# Patient Record
Sex: FEMALE | Race: WHITE | Employment: OTHER | URBAN - METROPOLITAN AREA
[De-identification: names, ages, dates, MRNs, and addresses within clinical notes are randomized per-mention and may not be internally consistent; named-entity substitution may affect disease eponyms.]

---

## 2020-07-17 ENCOUNTER — HOSPITAL ENCOUNTER (INPATIENT)
Age: 70
LOS: 11 days | Discharge: NURSING, REHAB OR LONG-TERM CARE | DRG: 377 | End: 2020-07-28
Attending: EMERGENCY MEDICINE | Admitting: INTERNAL MEDICINE
Payer: COMMERCIAL

## 2020-07-17 ENCOUNTER — APPOINTMENT (OUTPATIENT)
Dept: CT IMAGING | Age: 70
DRG: 377 | End: 2020-07-17
Payer: COMMERCIAL

## 2020-07-17 PROBLEM — N17.9 ACUTE KIDNEY INJURY (HCC): Status: ACTIVE | Noted: 2020-07-17

## 2020-07-17 LAB
A/G RATIO: 0.8 (ref 1.1–2.2)
ABO/RH: NORMAL
ALBUMIN SERPL-MCNC: 2.5 G/DL (ref 3.4–5)
ALP BLD-CCNC: 147 U/L (ref 40–129)
ALT SERPL-CCNC: 11 U/L (ref 10–40)
ANION GAP SERPL CALCULATED.3IONS-SCNC: 14 MMOL/L (ref 3–16)
ANTIBODY SCREEN: NORMAL
APTT: 38.7 SEC (ref 24.2–36.2)
AST SERPL-CCNC: 19 U/L (ref 15–37)
BACTERIA: ABNORMAL /HPF
BASOPHILS ABSOLUTE: 0 K/UL (ref 0–0.2)
BASOPHILS RELATIVE PERCENT: 0.5 %
BILIRUB SERPL-MCNC: <0.2 MG/DL (ref 0–1)
BILIRUBIN URINE: NEGATIVE
BLOOD, URINE: ABNORMAL
BUN BLDV-MCNC: 83 MG/DL (ref 7–20)
CALCIUM SERPL-MCNC: 8.1 MG/DL (ref 8.3–10.6)
CHLORIDE BLD-SCNC: 112 MMOL/L (ref 99–110)
CLARITY: ABNORMAL
CO2: 11 MMOL/L (ref 21–32)
COLOR: YELLOW
CREAT SERPL-MCNC: 3.8 MG/DL (ref 0.6–1.2)
EOSINOPHILS ABSOLUTE: 0 K/UL (ref 0–0.6)
EOSINOPHILS RELATIVE PERCENT: 0.3 %
EPITHELIAL CELLS, UA: ABNORMAL /HPF (ref 0–5)
GFR AFRICAN AMERICAN: 14
GFR NON-AFRICAN AMERICAN: 12
GLOBULIN: 3 G/DL
GLUCOSE BLD-MCNC: 114 MG/DL (ref 70–99)
GLUCOSE URINE: NEGATIVE MG/DL
HCT VFR BLD CALC: 23.6 % (ref 36–48)
HEMOGLOBIN: 7.5 G/DL (ref 12–16)
INR BLD: 1.68 (ref 0.86–1.14)
KETONES, URINE: NEGATIVE MG/DL
LACTIC ACID: 1.2 MMOL/L (ref 0.4–2)
LEUKOCYTE ESTERASE, URINE: ABNORMAL
LYMPHOCYTES ABSOLUTE: 0.7 K/UL (ref 1–5.1)
LYMPHOCYTES RELATIVE PERCENT: 10 %
MCH RBC QN AUTO: 30.1 PG (ref 26–34)
MCHC RBC AUTO-ENTMCNC: 31.7 G/DL (ref 31–36)
MCV RBC AUTO: 94.9 FL (ref 80–100)
MICROSCOPIC EXAMINATION: YES
MONOCYTES ABSOLUTE: 0.5 K/UL (ref 0–1.3)
MONOCYTES RELATIVE PERCENT: 7.2 %
MUCUS: ABNORMAL /LPF
NEUTROPHILS ABSOLUTE: 6.1 K/UL (ref 1.7–7.7)
NEUTROPHILS RELATIVE PERCENT: 82 %
NITRITE, URINE: NEGATIVE
OCCULT BLOOD DIAGNOSTIC: ABNORMAL
OCCULT BLOOD DIAGNOSTIC: NORMAL
PDW BLD-RTO: 18 % (ref 12.4–15.4)
PH UA: 5.5 (ref 5–8)
PLATELET # BLD: 154 K/UL (ref 135–450)
PMV BLD AUTO: 7.7 FL (ref 5–10.5)
POTASSIUM REFLEX MAGNESIUM: 4.8 MMOL/L (ref 3.5–5.1)
PROTEIN UA: >=300 MG/DL
PROTHROMBIN TIME: 19.6 SEC (ref 10–13.2)
RBC # BLD: 2.49 M/UL (ref 4–5.2)
RBC UA: ABNORMAL /HPF (ref 0–4)
SODIUM BLD-SCNC: 137 MMOL/L (ref 136–145)
SPECIFIC GRAVITY UA: 1.02 (ref 1–1.03)
TOTAL PROTEIN: 5.5 G/DL (ref 6.4–8.2)
URINE REFLEX TO CULTURE: YES
URINE TYPE: ABNORMAL
UROBILINOGEN, URINE: 0.2 E.U./DL
WBC # BLD: 7.4 K/UL (ref 4–11)
WBC UA: ABNORMAL /HPF (ref 0–5)

## 2020-07-17 PROCEDURE — 83605 ASSAY OF LACTIC ACID: CPT

## 2020-07-17 PROCEDURE — 74176 CT ABD & PELVIS W/O CONTRAST: CPT

## 2020-07-17 PROCEDURE — 87077 CULTURE AEROBIC IDENTIFY: CPT

## 2020-07-17 PROCEDURE — 85610 PROTHROMBIN TIME: CPT

## 2020-07-17 PROCEDURE — 85025 COMPLETE CBC W/AUTO DIFF WBC: CPT

## 2020-07-17 PROCEDURE — 2580000003 HC RX 258: Performed by: INTERNAL MEDICINE

## 2020-07-17 PROCEDURE — 31500 INSERT EMERGENCY AIRWAY: CPT

## 2020-07-17 PROCEDURE — 6360000002 HC RX W HCPCS: Performed by: EMERGENCY MEDICINE

## 2020-07-17 PROCEDURE — 86850 RBC ANTIBODY SCREEN: CPT

## 2020-07-17 PROCEDURE — 86901 BLOOD TYPING SEROLOGIC RH(D): CPT

## 2020-07-17 PROCEDURE — 80053 COMPREHEN METABOLIC PANEL: CPT

## 2020-07-17 PROCEDURE — U0003 INFECTIOUS AGENT DETECTION BY NUCLEIC ACID (DNA OR RNA); SEVERE ACUTE RESPIRATORY SYNDROME CORONAVIRUS 2 (SARS-COV-2) (CORONAVIRUS DISEASE [COVID-19]), AMPLIFIED PROBE TECHNIQUE, MAKING USE OF HIGH THROUGHPUT TECHNOLOGIES AS DESCRIBED BY CMS-2020-01-R: HCPCS

## 2020-07-17 PROCEDURE — 99285 EMERGENCY DEPT VISIT HI MDM: CPT

## 2020-07-17 PROCEDURE — 2580000003 HC RX 258: Performed by: EMERGENCY MEDICINE

## 2020-07-17 PROCEDURE — 86923 COMPATIBILITY TEST ELECTRIC: CPT

## 2020-07-17 PROCEDURE — P9016 RBC LEUKOCYTES REDUCED: HCPCS

## 2020-07-17 PROCEDURE — 2500000003 HC RX 250 WO HCPCS: Performed by: INTERNAL MEDICINE

## 2020-07-17 PROCEDURE — 86900 BLOOD TYPING SEROLOGIC ABO: CPT

## 2020-07-17 PROCEDURE — 85730 THROMBOPLASTIN TIME PARTIAL: CPT

## 2020-07-17 PROCEDURE — 87086 URINE CULTURE/COLONY COUNT: CPT

## 2020-07-17 PROCEDURE — 87186 SC STD MICRODIL/AGAR DIL: CPT

## 2020-07-17 PROCEDURE — 81001 URINALYSIS AUTO W/SCOPE: CPT

## 2020-07-17 PROCEDURE — 2500000003 HC RX 250 WO HCPCS: Performed by: EMERGENCY MEDICINE

## 2020-07-17 PROCEDURE — G0328 FECAL BLOOD SCRN IMMUNOASSAY: HCPCS

## 2020-07-17 PROCEDURE — 1200000000 HC SEMI PRIVATE

## 2020-07-17 RX ORDER — ACETAMINOPHEN 650 MG/1
650 SUPPOSITORY RECTAL EVERY 6 HOURS PRN
Status: DISCONTINUED | OUTPATIENT
Start: 2020-07-17 | End: 2020-07-28 | Stop reason: HOSPADM

## 2020-07-17 RX ORDER — ACETAMINOPHEN 500 MG
500 TABLET ORAL EVERY 4 HOURS PRN
COMMUNITY

## 2020-07-17 RX ORDER — DOCUSATE SODIUM 100 MG/1
100 CAPSULE, LIQUID FILLED ORAL DAILY PRN
COMMUNITY

## 2020-07-17 RX ORDER — LANOLIN ALCOHOL/MO/W.PET/CERES
325 CREAM (GRAM) TOPICAL 2 TIMES DAILY
COMMUNITY
End: 2020-10-08

## 2020-07-17 RX ORDER — ARIPIPRAZOLE 5 MG/1
5 TABLET ORAL DAILY
COMMUNITY

## 2020-07-17 RX ORDER — PANTOPRAZOLE SODIUM 40 MG/10ML
40 INJECTION, POWDER, LYOPHILIZED, FOR SOLUTION INTRAVENOUS 2 TIMES DAILY
Status: DISCONTINUED | OUTPATIENT
Start: 2020-07-18 | End: 2020-07-28 | Stop reason: HOSPADM

## 2020-07-17 RX ORDER — ACETAMINOPHEN 325 MG/1
650 TABLET ORAL EVERY 6 HOURS PRN
Status: DISCONTINUED | OUTPATIENT
Start: 2020-07-17 | End: 2020-07-28 | Stop reason: HOSPADM

## 2020-07-17 RX ORDER — ONDANSETRON 4 MG/1
4 TABLET, FILM COATED ORAL EVERY 6 HOURS PRN
COMMUNITY

## 2020-07-17 RX ORDER — LEVETIRACETAM 500 MG/1
500 TABLET ORAL 2 TIMES DAILY
Status: DISCONTINUED | OUTPATIENT
Start: 2020-07-18 | End: 2020-07-28 | Stop reason: HOSPADM

## 2020-07-17 RX ORDER — PROMETHAZINE HYDROCHLORIDE 25 MG/1
12.5 TABLET ORAL EVERY 6 HOURS PRN
Status: DISCONTINUED | OUTPATIENT
Start: 2020-07-17 | End: 2020-07-28 | Stop reason: HOSPADM

## 2020-07-17 RX ORDER — SENNA PLUS 8.6 MG/1
1 TABLET ORAL DAILY PRN
COMMUNITY

## 2020-07-17 RX ORDER — ISOSORBIDE MONONITRATE 30 MG/1
30 TABLET, EXTENDED RELEASE ORAL DAILY
Status: DISCONTINUED | OUTPATIENT
Start: 2020-07-18 | End: 2020-07-28 | Stop reason: HOSPADM

## 2020-07-17 RX ORDER — PRAVASTATIN SODIUM 40 MG
40 TABLET ORAL DAILY
COMMUNITY

## 2020-07-17 RX ORDER — LEVOTHYROXINE SODIUM 0.05 MG/1
75 TABLET ORAL DAILY
COMMUNITY

## 2020-07-17 RX ORDER — ASCORBIC ACID 500 MG
500 TABLET ORAL DAILY
COMMUNITY

## 2020-07-17 RX ORDER — DOXYCYCLINE HYCLATE 100 MG/1
100 CAPSULE ORAL 2 TIMES DAILY
Status: ON HOLD | COMMUNITY
End: 2020-07-28 | Stop reason: HOSPADM

## 2020-07-17 RX ORDER — ASPIRIN 81 MG/1
81 TABLET ORAL DAILY
COMMUNITY

## 2020-07-17 RX ORDER — ONDANSETRON 2 MG/ML
4 INJECTION INTRAMUSCULAR; INTRAVENOUS EVERY 6 HOURS PRN
Status: DISCONTINUED | OUTPATIENT
Start: 2020-07-17 | End: 2020-07-28 | Stop reason: HOSPADM

## 2020-07-17 RX ORDER — RALOXIFENE HYDROCHLORIDE 60 MG/1
60 TABLET, FILM COATED ORAL DAILY
COMMUNITY

## 2020-07-17 RX ORDER — SERTRALINE HYDROCHLORIDE 25 MG/1
50 TABLET, FILM COATED ORAL DAILY
COMMUNITY

## 2020-07-17 RX ORDER — RALOXIFENE HYDROCHLORIDE 60 MG/1
60 TABLET, FILM COATED ORAL DAILY
Status: DISCONTINUED | OUTPATIENT
Start: 2020-07-18 | End: 2020-07-28 | Stop reason: HOSPADM

## 2020-07-17 RX ORDER — SODIUM CHLORIDE 0.9 % (FLUSH) 0.9 %
10 SYRINGE (ML) INJECTION EVERY 12 HOURS SCHEDULED
Status: DISCONTINUED | OUTPATIENT
Start: 2020-07-18 | End: 2020-07-28 | Stop reason: HOSPADM

## 2020-07-17 RX ORDER — ISOSORBIDE MONONITRATE 30 MG/1
30 TABLET, EXTENDED RELEASE ORAL DAILY
Status: ON HOLD | COMMUNITY
End: 2020-10-10 | Stop reason: HOSPADM

## 2020-07-17 RX ORDER — PRAVASTATIN SODIUM 40 MG
40 TABLET ORAL NIGHTLY
Status: DISCONTINUED | OUTPATIENT
Start: 2020-07-18 | End: 2020-07-28 | Stop reason: HOSPADM

## 2020-07-17 RX ORDER — SODIUM CHLORIDE 0.9 % (FLUSH) 0.9 %
10 SYRINGE (ML) INJECTION PRN
Status: DISCONTINUED | OUTPATIENT
Start: 2020-07-17 | End: 2020-07-28 | Stop reason: HOSPADM

## 2020-07-17 RX ADMIN — METRONIDAZOLE 500 MG: 500 INJECTION, SOLUTION INTRAVENOUS at 23:06

## 2020-07-17 RX ADMIN — SODIUM BICARBONATE: 84 INJECTION, SOLUTION INTRAVENOUS at 21:35

## 2020-07-17 RX ADMIN — CEFEPIME 2 G: 2 INJECTION, POWDER, FOR SOLUTION INTRAVENOUS at 21:32

## 2020-07-17 ASSESSMENT — PAIN DESCRIPTION - LOCATION: LOCATION: ABDOMEN

## 2020-07-17 ASSESSMENT — PAIN SCALES - GENERAL: PAINLEVEL_OUTOF10: 8

## 2020-07-17 ASSESSMENT — PAIN DESCRIPTION - FREQUENCY: FREQUENCY: INTERMITTENT

## 2020-07-17 ASSESSMENT — PAIN DESCRIPTION - ORIENTATION: ORIENTATION: RIGHT

## 2020-07-17 ASSESSMENT — PAIN DESCRIPTION - DESCRIPTORS: DESCRIPTORS: CRAMPING

## 2020-07-17 NOTE — ED NOTES
Lab called to report Co2 of 11 and BUN 83. Dr. Pam Zee aware. Will inform pt nurse.       Suzanne Kendall RN  07/17/20 8691

## 2020-07-17 NOTE — ED PROVIDER NOTES
[address] ED    CHIEF COMPLAINT  Abnormal Lab (Pt was brought in by EMS from [facility] for H&H of 6 & 20. )       HISTORY OF PRESENT ILLNESS  Netta Iglesias is a 72 y.o. female with past medical history including dementia, DVT (on Eliquis), hyperlipidemia, status post pacemaker who presents to the ED with concern for anemia noted on laboratory studies in the outpatient setting. The patient presents by EMS from [facility] where she apparently had labs revealing hemoglobin and hematocrit of 6 and 20 respectively. The patient denies chest pain. She states she was short of breath earlier in the day but not presently with shortness of breath. Denies nausea, vomiting. She complains of loose stool. She is not sure if the stool is bloody. Asked about abdominal pain the patient motions around the umbilicus but does not elaborate further. The patient is somewhat of a poor historian. Call to nursing facility reveals that patient 1 week ago had several days of nausea and vomiting however this apparently had resolved over the past 3 days. No other complaints, modifying factors or associated symptoms. I have reviewed the following from the nursing documentation:    Past Medical History:   Diagnosis Date    Anemia     Cognitive communication deficit     [facility] [address]     Depressive disorder     Muscle wasting     Osteomyelitis of lumbar spine ([facility] [address]     Sepsis ([facility] [address]      No past surgical history on file. No family history on file.   Social History     Socioeconomic History    Marital status: Single     Spouse name: Not on file    Number of children: Not on file    Years of education: Not on file    Highest education level: Not on file   Occupational History    Not on file   Social Needs    Financial resource strain: Not on file    Food insecurity     Worry: Not on file     Inability: Not on file    Transportation needs     Medical: Not on file     Non-medical: Not on file Tobacco Use    Smoking status: Not on file   Substance and Sexual Activity    Alcohol use: Not on file    Drug use: Not on file    Sexual activity: Not on file   Lifestyle    Physical activity     Days per week: Not on file     Minutes per session: Not on file    Stress: Not on file   Relationships    Social connections     Talks on phone: Not on file     Gets together: Not on file     Attends Voodoo service: Not on file     Active member of club or organization: Not on file     Attends meetings of clubs or organizations: Not on file     Relationship status: Not on file    Intimate partner violence     Fear of current or ex partner: Not on file     Emotionally abused: Not on file     Physically abused: Not on file     Forced sexual activity: Not on file   Other Topics Concern    Not on file   Social History Narrative    Not on file     Current Facility-Administered Medications   Medication Dose Route Frequency Provider Last Rate Last Dose    cefepime (MAXIPIME) 2 g IVPB minibag  2 g Intravenous Once Tad MD Omero        metronidazole (FLAGYL) 500 mg in NaCl 100 mL IVPB premix  500 mg Intravenous Once Tad MD Omero        sodium bicarbonate 75 mEq in sodium chloride 0.45 % 1,000 mL infusion   Intravenous Continuous Arlene Weiss MD         No current outpatient medications on file. Allergies   Allergen Reactions    Penicillins Rash       REVIEW OF SYSTEMS  10 systems reviewed, pertinent positives and negatives per HPI, otherwise noted to be negative. PHYSICAL EXAM  ED Triage Vitals [07/17/20 1824]   Enc Vitals Group      BP (!) 159/84      Pulse 92      Resp 18      Temp 98.8 °F (37.1 °C)      Temp Source Oral      SpO2 99 %      Weight       Height       Head Circumference       Peak Flow       Pain Score       Pain Loc       Pain Edu? Excl. in [address]? General appearance: Awake and alert. Cooperative. No acute distress. HENT: Normocephalic. Atraumatic.  Mucous membranes are tacky. Neck: Supple. Eyes: PERRL. EOMI. Heart/Chest: RRR. No murmurs. Lungs: Respirations unlabored. CTAB. Good air exchange. Speaking comfortably in full sentences. Abdomen: Soft. Non-tender. Non-distended. No rebound or guarding. Rectal: external hemorrhoids, no internal masses, no gross blood, trace stool  Musculoskeletal: No extremity edema. No deformity. No tenderness in the extremities. All extremities neurovascularly intact. Skin: Warm and dry. No acute rashes. Neurological: Alert and oriented. CN II-XII intact. Moves all four extremities. Sensation intact to light touch. Psychiatric: Mood/affect: normal      LABS  I have reviewed all labs for this visit.    Results for orders placed or performed during the hospital encounter of 07/17/20   CBC Auto Differential   Result Value Ref Range    WBC 7.4 4.0 - 11.0 K/uL    RBC 2.49 (L) 4.00 - 5.20 M/uL    Hemoglobin 7.5 (L) 12.0 - 16.0 g/dL    Hematocrit 23.6 (L) 36.0 - 48.0 %    MCV 94.9 80.0 - 100.0 fL    MCH 30.1 26.0 - 34.0 pg    MCHC 31.7 31.0 - 36.0 g/dL    RDW 18.0 (H) 12.4 - 15.4 %    Platelets 511 952 - 431 K/uL    MPV 7.7 5.0 - 10.5 fL    Neutrophils % 82.0 %    Lymphocytes % 10.0 %    Monocytes % 7.2 %    Eosinophils % 0.3 %    Basophils % 0.5 %    Neutrophils Absolute 6.1 1.7 - 7.7 K/uL    Lymphocytes Absolute 0.7 (L) 1.0 - 5.1 K/uL    Monocytes Absolute 0.5 0.0 - 1.3 K/uL    Eosinophils Absolute 0.0 0.0 - 0.6 K/uL    Basophils Absolute 0.0 0.0 - 0.2 K/uL   Comprehensive Metabolic Panel w/ Reflex to MG   Result Value Ref Range    Sodium 137 136 - 145 mmol/L    Potassium reflex Magnesium 4.8 3.5 - 5.1 mmol/L    Chloride 112 (H) 99 - 110 mmol/L    CO2 11 (LL) 21 - 32 mmol/L    Anion Gap 14 3 - 16    Glucose 114 (H) 70 - 99 mg/dL    BUN 83 (HH) 7 - 20 mg/dL    CREATININE 3.8 (H) 0.6 - 1.2 mg/dL    GFR Non- 12 (A) >60    GFR  14 (A) >60    Calcium 8.1 (L) 8.3 - 10.6 mg/dL    Total Protein 5.5 (L) 6.4 - 8.2 g/dL    Alb 2.5 (L) 3.4 - 5.0 g/dL    Albumin/Globulin Ratio 0.8 (L) 1.1 - 2.2    Total Bilirubin <0.2 0.0 - 1.0 mg/dL    Alkaline Phosphatase 147 (H) 40 - 129 U/L    ALT 11 10 - 40 U/L    AST 19 15 - 37 U/L    Globulin 3.0 g/dL   Protime-INR   Result Value Ref Range    Protime 19.6 (H) 10.0 - 13.2 sec    INR 1.68 (H) 0.86 - 1.14   APTT   Result Value Ref Range    aPTT 38.7 (H) 24.2 - 36.2 sec   Lactic Acid, Plasma   Result Value Ref Range    Lactic Acid 1.2 0.4 - 2.0 mmol/L   Urinalysis Reflex to Culture    Specimen: Urine, clean catch   Result Value Ref Range    Color, UA Yellow Straw/Yellow    Clarity, UA SL CLOUDY (A) Clear    Glucose, Ur Negative Negative mg/dL    Bilirubin Urine Negative Negative    Ketones, Urine Negative Negative mg/dL    Specific Gravity, UA 1.025 1.005 - 1.030    Blood, Urine LARGE (A) Negative    pH, UA 5.5 5.0 - 8.0    Protein, UA >=300 (A) Negative mg/dL    Urobilinogen, Urine 0.2 <2.0 E.U./dL    Nitrite, Urine Negative Negative    Leukocyte Esterase, Urine MODERATE (A) Negative    Microscopic Examination YES     Urine Type NotGiven     Urine Reflex to Culture Yes    Blood occult stool #1   Result Value Ref Range    Occult Blood Diagnostic Result: Negative  Normal range: Negative      Microscopic Urinalysis   Result Value Ref Range    Mucus, UA 2+ (A) None Seen /LPF    WBC, UA 21-50 (A) 0 - 5 /HPF    RBC, UA  (A) 0 - 4 /HPF    Epithelial Cells, UA 11-20 (A) 0 - 5 /HPF    Bacteria, UA 3+ (A) None Seen /HPF   TYPE AND SCREEN   Result Value Ref Range    ABO/Rh B POS     Antibody Screen NEG        RADIOLOGY  I have reviewed all radiographic studies for this visit. CT ABDOMEN PELVIS WO CONTRAST Additional Contrast? None   Final Result   Marked mural thickening of the rectum with perirectal fat stranding,   suggesting proctitis. That should be followed to resolution to ensure that   there is no underlying neoplasm.       Small bilateral pleural effusions with minimal adjacent atelectasis. ED COURSE/MDM  Patient seen and evaluated. Old records reviewed. Labs and imaging reviewed and results discussed with patient/family to extent possible. 60-year-old female presents for evaluation of anemia after outpatient labs revealed hemoglobin 6. On arrival the patient is slightly hypertensive with otherwise reassuring vital signs. Abdominal exam is benign. Rectal exam no gross blood, no masses. Fecal occult blood test is positive. The patient complains of lower abdominal pain and urine appears possibly consistent with urinary tract infection. CT of the abdomen and pelvis shows thickening of the rectum with pararectal fat stranding suggesting the possibility of proctitis. The rectal exam was essentially unremarkable but will administer cefepime and Flagyl to cover the urinary tract infection and the possible proctitis. I do note the possibility of underlying neoplasm given this finding and this will need to be followed during the patient's admission. Labs are otherwise notable for creatinine of 3.8 with no known history of chronic kidney disease. Favor prerenal as the patient appears hypovolemic. CBC shows anemia with a hemoglobin of 7.5, no medication for transfusion at this time but will require trending. Admit to hospital medicine for further evaluation and treatment. During the patient's ED course, the patient was given:  Medications   cefepime (MAXIPIME) 2 g IVPB minibag (has no administration in time range)   metronidazole (FLAGYL) 500 mg in NaCl 100 mL IVPB premix (has no administration in time range)   sodium bicarbonate 75 mEq in sodium chloride 0.45 % 1,000 mL infusion (has no administration in time range)        All questions were answered and the patient/family expressed understanding and agreement with the plan. PROCEDURES  None    CRITICAL CARE  N/A    CLINICAL IMPRESSION  1. JEN (acute kidney injury) ([facility] [address]    2.  Anemia, unspecified type DISPOSITION   admit    Emi Russell MD    Note: This chart was created using voice recognition dictation software. Efforts were made by me to ensure accuracy, however some errors may be present due to limitations of this technology and occasionally words are not transcribed correctly.         Emi Russell MD  07/17/20 8359

## 2020-07-18 PROBLEM — R56.9 SEIZURES (HCC): Status: ACTIVE | Noted: 2020-07-18

## 2020-07-18 PROBLEM — E03.9 ACQUIRED HYPOTHYROIDISM: Status: ACTIVE | Noted: 2020-07-18

## 2020-07-18 PROBLEM — K92.2 GI BLEED: Status: ACTIVE | Noted: 2020-07-18

## 2020-07-18 PROBLEM — D62 ACUTE BLOOD LOSS ANEMIA: Status: ACTIVE | Noted: 2020-07-18

## 2020-07-18 PROBLEM — K62.89 PROCTITIS: Status: ACTIVE | Noted: 2020-07-18

## 2020-07-18 LAB
ALBUMIN SERPL-MCNC: 2 G/DL (ref 3.4–5)
ALBUMIN SERPL-MCNC: 2.1 G/DL (ref 3.4–5)
ANION GAP SERPL CALCULATED.3IONS-SCNC: 14 MMOL/L (ref 3–16)
ANION GAP SERPL CALCULATED.3IONS-SCNC: 15 MMOL/L (ref 3–16)
BASOPHILS ABSOLUTE: 0 K/UL (ref 0–0.2)
BASOPHILS RELATIVE PERCENT: 0.7 %
BLOOD BANK DISPENSE STATUS: NORMAL
BLOOD BANK PRODUCT CODE: NORMAL
BPU ID: NORMAL
BUN BLDV-MCNC: 83 MG/DL (ref 7–20)
BUN BLDV-MCNC: 86 MG/DL (ref 7–20)
CALCIUM SERPL-MCNC: 7.6 MG/DL (ref 8.3–10.6)
CALCIUM SERPL-MCNC: 7.8 MG/DL (ref 8.3–10.6)
CHLORIDE BLD-SCNC: 113 MMOL/L (ref 99–110)
CHLORIDE BLD-SCNC: 114 MMOL/L (ref 99–110)
CO2: 10 MMOL/L (ref 21–32)
CO2: 13 MMOL/L (ref 21–32)
CREAT SERPL-MCNC: 3.7 MG/DL (ref 0.6–1.2)
CREAT SERPL-MCNC: 3.7 MG/DL (ref 0.6–1.2)
DESCRIPTION BLOOD BANK: NORMAL
EOSINOPHILS ABSOLUTE: 0 K/UL (ref 0–0.6)
EOSINOPHILS RELATIVE PERCENT: 0.4 %
GFR AFRICAN AMERICAN: 15
GFR AFRICAN AMERICAN: 15
GFR NON-AFRICAN AMERICAN: 12
GFR NON-AFRICAN AMERICAN: 12
GLUCOSE BLD-MCNC: 111 MG/DL (ref 70–99)
GLUCOSE BLD-MCNC: 86 MG/DL (ref 70–99)
HCT VFR BLD CALC: 20.3 % (ref 36–48)
HCT VFR BLD CALC: 24.1 % (ref 36–48)
HCT VFR BLD CALC: 25.2 % (ref 36–48)
HCT VFR BLD CALC: 26.8 % (ref 36–48)
HEMOGLOBIN: 6.5 G/DL (ref 12–16)
HEMOGLOBIN: 7.3 G/DL (ref 12–16)
HEMOGLOBIN: 8.4 G/DL (ref 12–16)
HEMOGLOBIN: 8.8 G/DL (ref 12–16)
LYMPHOCYTES ABSOLUTE: 0.8 K/UL (ref 1–5.1)
LYMPHOCYTES RELATIVE PERCENT: 15.8 %
MCH RBC QN AUTO: 30.4 PG (ref 26–34)
MCHC RBC AUTO-ENTMCNC: 32.2 G/DL (ref 31–36)
MCV RBC AUTO: 94.2 FL (ref 80–100)
MONOCYTES ABSOLUTE: 0.4 K/UL (ref 0–1.3)
MONOCYTES RELATIVE PERCENT: 8.3 %
NEUTROPHILS ABSOLUTE: 3.8 K/UL (ref 1.7–7.7)
NEUTROPHILS RELATIVE PERCENT: 74.8 %
PDW BLD-RTO: 17 % (ref 12.4–15.4)
PHOSPHORUS: 5.3 MG/DL (ref 2.5–4.9)
PHOSPHORUS: 5.5 MG/DL (ref 2.5–4.9)
PLATELET # BLD: 138 K/UL (ref 135–450)
PMV BLD AUTO: 7.8 FL (ref 5–10.5)
POTASSIUM SERPL-SCNC: 4.3 MMOL/L (ref 3.5–5.1)
POTASSIUM SERPL-SCNC: 4.7 MMOL/L (ref 3.5–5.1)
RBC # BLD: 2.15 M/UL (ref 4–5.2)
SODIUM BLD-SCNC: 139 MMOL/L (ref 136–145)
SODIUM BLD-SCNC: 140 MMOL/L (ref 136–145)
WBC # BLD: 5.1 K/UL (ref 4–11)

## 2020-07-18 PROCEDURE — 2580000003 HC RX 258: Performed by: INTERNAL MEDICINE

## 2020-07-18 PROCEDURE — 85025 COMPLETE CBC W/AUTO DIFF WBC: CPT

## 2020-07-18 PROCEDURE — C9113 INJ PANTOPRAZOLE SODIUM, VIA: HCPCS | Performed by: INTERNAL MEDICINE

## 2020-07-18 PROCEDURE — 6370000000 HC RX 637 (ALT 250 FOR IP): Performed by: INTERNAL MEDICINE

## 2020-07-18 PROCEDURE — 2500000003 HC RX 250 WO HCPCS: Performed by: INTERNAL MEDICINE

## 2020-07-18 PROCEDURE — 1200000000 HC SEMI PRIVATE

## 2020-07-18 PROCEDURE — 85014 HEMATOCRIT: CPT

## 2020-07-18 PROCEDURE — 6360000002 HC RX W HCPCS: Performed by: INTERNAL MEDICINE

## 2020-07-18 PROCEDURE — 85018 HEMOGLOBIN: CPT

## 2020-07-18 PROCEDURE — 80069 RENAL FUNCTION PANEL: CPT

## 2020-07-18 PROCEDURE — 99233 SBSQ HOSP IP/OBS HIGH 50: CPT | Performed by: INTERNAL MEDICINE

## 2020-07-18 PROCEDURE — 36430 TRANSFUSION BLD/BLD COMPNT: CPT

## 2020-07-18 PROCEDURE — 30233N1 TRANSFUSION OF NONAUTOLOGOUS RED BLOOD CELLS INTO PERIPHERAL VEIN, PERCUTANEOUS APPROACH: ICD-10-PCS | Performed by: INTERNAL MEDICINE

## 2020-07-18 PROCEDURE — 36415 COLL VENOUS BLD VENIPUNCTURE: CPT

## 2020-07-18 RX ORDER — 0.9 % SODIUM CHLORIDE 0.9 %
20 INTRAVENOUS SOLUTION INTRAVENOUS ONCE
Status: COMPLETED | OUTPATIENT
Start: 2020-07-18 | End: 2020-07-18

## 2020-07-18 RX ORDER — LACTULOSE 10 G/15ML
20 SOLUTION ORAL DAILY PRN
Status: ON HOLD | COMMUNITY
End: 2020-10-09 | Stop reason: ALTCHOICE

## 2020-07-18 RX ORDER — HYDROCODONE BITARTRATE AND ACETAMINOPHEN 5; 325 MG/1; MG/1
1 TABLET ORAL EVERY 6 HOURS PRN
Status: ON HOLD | COMMUNITY
End: 2020-07-28 | Stop reason: HOSPADM

## 2020-07-18 RX ADMIN — SERTRALINE 50 MG: 50 TABLET, FILM COATED ORAL at 08:15

## 2020-07-18 RX ADMIN — METRONIDAZOLE 500 MG: 500 INJECTION, SOLUTION INTRAVENOUS at 08:19

## 2020-07-18 RX ADMIN — Medication 10 ML: at 20:41

## 2020-07-18 RX ADMIN — Medication: at 11:03

## 2020-07-18 RX ADMIN — LEVETIRACETAM 500 MG: 500 TABLET ORAL at 00:40

## 2020-07-18 RX ADMIN — ONDANSETRON HYDROCHLORIDE 4 MG: 2 INJECTION, SOLUTION INTRAMUSCULAR; INTRAVENOUS at 00:50

## 2020-07-18 RX ADMIN — PRAVASTATIN SODIUM 40 MG: 40 TABLET ORAL at 00:40

## 2020-07-18 RX ADMIN — RALOXIFENE HYDROCHLORIDE 60 MG: 60 TABLET, FILM COATED ORAL at 08:15

## 2020-07-18 RX ADMIN — Medication: at 23:03

## 2020-07-18 RX ADMIN — CEFTRIAXONE SODIUM 1 G: 1 INJECTION, POWDER, FOR SOLUTION INTRAMUSCULAR; INTRAVENOUS at 17:02

## 2020-07-18 RX ADMIN — LEVETIRACETAM 500 MG: 500 TABLET ORAL at 08:14

## 2020-07-18 RX ADMIN — SODIUM CHLORIDE 20 ML: 9 INJECTION, SOLUTION INTRAVENOUS at 11:02

## 2020-07-18 RX ADMIN — Medication 10 ML: at 00:41

## 2020-07-18 RX ADMIN — METRONIDAZOLE 500 MG: 500 INJECTION, SOLUTION INTRAVENOUS at 15:04

## 2020-07-18 RX ADMIN — PANTOPRAZOLE SODIUM 40 MG: 40 INJECTION, POWDER, FOR SOLUTION INTRAVENOUS at 00:41

## 2020-07-18 RX ADMIN — ISOSORBIDE MONONITRATE 30 MG: 30 TABLET ORAL at 08:14

## 2020-07-18 RX ADMIN — PANTOPRAZOLE SODIUM 40 MG: 40 INJECTION, POWDER, FOR SOLUTION INTRAVENOUS at 20:41

## 2020-07-18 RX ADMIN — PANTOPRAZOLE SODIUM 40 MG: 40 INJECTION, POWDER, FOR SOLUTION INTRAVENOUS at 08:14

## 2020-07-18 RX ADMIN — PRAVASTATIN SODIUM 40 MG: 40 TABLET ORAL at 20:41

## 2020-07-18 RX ADMIN — LEVETIRACETAM 500 MG: 500 TABLET ORAL at 20:40

## 2020-07-18 RX ADMIN — Medication 10 ML: at 08:21

## 2020-07-18 ASSESSMENT — PAIN SCALES - GENERAL
PAINLEVEL_OUTOF10: 0

## 2020-07-18 NOTE — ED NOTES
Perfect Serve message sent to Dr. Doc Ovalles for admission     Adams Zayas RN  07/17/20 2040 2110~Dr. Doc Ovalles returned call to Dr. Rodrick Ugalde RN  07/17/20 2114

## 2020-07-18 NOTE — PROGRESS NOTES
Progress Note    Admit Date:  7/17/2020    Subjective:  Ms. El Cassette not a very good historian. She does have some lower abdominal pain and has had some rectal bleeding. No fever or chills. Baseline creatinine is not known but admission creatinine was 3.8. Since she came from the nursing home she is a COVID-19 rule out. No chest pain or shortness of breath. Objective:   BP (!) 147/84   Pulse 83   Temp 97.6 °F (36.4 °C) (Oral)   Resp 16   SpO2 99%          Intake/Output Summary (Last 24 hours) at 7/18/2020 1104  Last data filed at 7/18/2020 0549  Gross per 24 hour   Intake 750 ml   Output --   Net 750 ml       Physical Exam:    General appearance: alert, appears stated age and cooperative  Head: Normocephalic, without obvious abnormality, atraumatic  Eyes: conjunctivae/corneas clear. PERRL, EOM's intact.   Neck: no adenopathy, no carotid bruit, no JVD, supple, symmetrical, trachea midline and thyroid not enlarged, symmetric, no tenderness/mass/nodules  Lungs: clear to auscultation bilaterally  Heart: regular rate and rhythm, S1, S2 normal, no murmur, click, rub or gallop  Abdomen: soft, non-tender; bowel sounds normal; no masses,  no organomegaly  Extremities: extremities normal, atraumatic, no cyanosis or edema  Pulses: 2+ and symmetric  Skin: Skin color, texture, turgor normal. No rashes or lesions  Neurologic: Grossly normal    Scheduled Meds:   sodium chloride  20 mL Intravenous Once    sodium chloride flush  10 mL Intravenous 2 times per day    cefTRIAXone (ROCEPHIN) IV  1 g Intravenous Q24H    metroNIDAZOLE  500 mg Intravenous Q8H    levETIRAcetam  500 mg Oral BID    raloxifene  60 mg Oral Daily    isosorbide mononitrate  30 mg Oral Daily    sertraline  50 mg Oral Daily    pravastatin  40 mg Oral Nightly    pantoprazole  40 mg Intravenous BID       Continuous Infusions:   sodium bicarbonate infusion 125 mL/hr at 07/18/20 1103       PRN Meds:  sodium chloride flush, acetaminophen **OR** DVT prophylaxis.     Jenny Alcala MD 7/18/2020 11:04 AM

## 2020-07-18 NOTE — PLAN OF CARE
Problem: Falls - Risk of:  Goal: Will remain free from falls  Description: Will remain free from falls  Outcome: Ongoing  Goal: Absence of physical injury  Description: Absence of physical injury  Outcome: Ongoing     Problem: Infection:  Goal: Will remain free from infection  Description: Will remain free from infection  Outcome: Ongoing     Problem: Safety:  Goal: Free from accidental physical injury  Description: Free from accidental physical injury  Outcome: Ongoing    Problem: Daily Care:  Goal: Daily care needs are met  Description: Daily care needs are met  Outcome: Ongoing     Problem: Pain:  Goal: Patient's pain/discomfort is manageable  Description: Patient's pain/discomfort is manageable  Outcome: Ongoing     Problem: Skin Integrity:  Goal: Skin integrity will stabilize  Description: Skin integrity will stabilize  Outcome: Ongoing     Problem: Discharge Planning:  Goal: Patients continuum of care needs are met  Description: Patients continuum of care needs are met  Outcome: Ongoing

## 2020-07-18 NOTE — CONSULTS
Gastroenterology Consult Note    Patient:   Chapo Owusu   :    1950   Facility:   [facility]   Referring/PCP: Elo Juan MD  Date:     2020  Consultant:   Kena Aguilera MD      Chief Complaint   Patient presents with    Abnormal Lab     Pt was brought in by EMS from [facility] for H&H of 6 & 20. History of Present illness     72year old female with history of hypothyroidism, DVT, cardiac pacemaker, HLD, seizure do, osteomyelitis, depression, and dementia transferred from NH with abnormal labs. She is a poor historian due to underlying dementia. She mentioned intermittent rectal bleeding. She denies any abdominal pain, nausea, vomiting, dysphagia, hematemesis, or melena. She does not recall ever having colonoscopy. She is on Eliquis for DVT. She was noted to have severe anemia with Hgb of 6.5. CT showed proctitis. Anticoagulation has been held. Past Medical History:   Diagnosis Date    Anemia     Cognitive communication deficit     Convulsions ([facility] [address]     Depressive disorder     Muscle wasting     Osteomyelitis of lumbar spine ([facility] [address]     Sepsis ([facility] [address]      History reviewed. No pertinent surgical history. Social:   Social History     Tobacco Use    Smoking status: Never Smoker    Smokeless tobacco: Never Used   Substance Use Topics    Alcohol use: Not on file     Family: History reviewed. No pertinent family history. No current facility-administered medications on file prior to encounter. Current Outpatient Medications on File Prior to Encounter   Medication Sig Dispense Refill    bismuth subsalicylate (PEPTO BISMOL) 525 MG/15ML suspension Take 30 mLs by mouth every 8 hours as needed for Indigestion      HYDROcodone-acetaminophen (NORCO) 5-325 MG per tablet Take 1 tablet by mouth every 6 hours as needed for Pain.       lactulose (CHRONULAC) 10 GM/15ML solution Take 20 g by mouth daily as needed      Nutritional Supplements (RESOURCE 2.0 PO) Take 120 mLs by mouth 3 times daily      levothyroxine (SYNTHROID) 75 MCG tablet Take 75 mcg by mouth Daily      ARIPiprazole (ABILIFY) 10 MG tablet Take 10 mg by mouth daily      aspirin 81 MG EC tablet Take 81 mg by mouth daily      docusate sodium (COLACE) 100 MG capsule Take 100 mg by mouth 2 times daily      doxycycline hyclate (VIBRAMYCIN) 100 MG capsule Take 100 mg by mouth 2 times daily      apixaban (ELIQUIS) 5 MG TABS tablet Take 5 mg by mouth 2 times daily      ferrous sulfate (FE TABS 325) 325 (65 Fe) MG EC tablet Take 325 mg by mouth 2 times daily      isosorbide mononitrate (IMDUR) 30 MG extended release tablet Take 30 mg by mouth daily      levETIRAcetam (KEPPRA) 500 MG tablet Take 500 mg by mouth 2 times daily      raloxifene (EVISTA) 60 MG tablet Take 60 mg by mouth daily      senna (SENOKOT) 8.6 MG tablet Take 1 tablet by mouth 2 times daily      vitamin C (ASCORBIC ACID) 500 MG tablet Take 500 mg by mouth daily      sertraline (ZOLOFT) 50 MG tablet Take 50 mg by mouth daily      pravastatin (PRAVACHOL) 40 MG tablet Take 40 mg by mouth daily      acetaminophen (TYLENOL) 500 MG tablet Take 500 mg by mouth every 4 hours as needed for Pain      ondansetron (ZOFRAN) 4 MG tablet Take 4 mg by mouth every 6 hours as needed for Nausea or Vomiting        Infusions:    sodium bicarbonate infusion 125 mL/hr at 07/18/20 1103     PRN Medications: sodium chloride flush, acetaminophen **OR** acetaminophen, promethazine **OR** ondansetron  Allergies: Allergies   Allergen Reactions    Penicillins Rash       ROS: unobtainable due to underlying dementia    Physical Exam   BP (!) 150/89   Pulse 86   Temp 97.7 °F (36.5 °C) (Oral)   Resp 16   SpO2 99%     Due to the current efforts to prevent transmission of COVID-19 and also the need to preserve PPE for other caregivers, a face-to-face encounter with the patient was not performed.  That being said, all relevant records and diagnostic tests were reviewed, including laboratory results and imaging. Please reference any relevant documentation elsewhere. Care will be coordinated with the primary service. Lab and Imaging Review   Labs:  CBC:   Recent Labs     07/17/20 1857 07/18/20  0131 07/18/20  0521   WBC 7.4  --  5.1   HGB 7.5* 7.3* 6.5*   HCT 23.6* 24.1* 20.3*   MCV 94.9  --  94.2     --  138     BMP:   Recent Labs     07/17/20 1857 07/18/20  0521    139   K 4.8 4.7   * 114*   CO2 11* 10*   PHOS  --  5.5*   BUN 83* 86*   CREATININE 3.8* 3.7*     LIVER PROFILE:   Recent Labs     07/17/20 1857   AST 19   ALT 11   PROT 5.5*   BILITOT <0.2   ALKPHOS 147*     PT/INR:   Recent Labs     07/17/20 1857   INR 1.68*     CT abd/pelvis:  Marked mural thickening of the rectum with perirectal fat stranding,    suggesting proctitis.  That should be followed to resolution to ensure that    there is no underlying neoplasm.         Small bilateral pleural effusions with minimal adjacent atelectasis. Assessment:     72year old female with history of hypothyroidism, DVT, cardiac pacemaker, HLD, seizure do, osteomyelitis, depression, and dementia admitted with anemia and proctitis. Differential includes ulcerative proctitis, stercoral ulcers, less likely PUD and malignancy    Plan:   1. Continue supportive care  2. Monitor Hgb and transfuse as necessary  3. Observe for signs of bleeding  4. Hold anticoagulation  5. Start clear diet  6. PPI BID  7. Check stool tests  8.  Colonoscopy and possible EGD once COVID is ruled out      Irineo Kelly MD  12:07 PM 7/18/2020

## 2020-07-18 NOTE — ED NOTES
Assisted pt to restroom per wheel chair. Urine sample obtained.      Nickolas Sellers RN  07/17/20 2009

## 2020-07-18 NOTE — CONSULTS
[facility]    Consult Note           Reason for Consult: JEN  Requesting Physician:  Dr. Sayra Dhillon for    Chief Complaint:    Chief Complaint   Patient presents with    Abnormal Lab     Pt was brought in by EMS from [facility] for H&H of 6 & 20. History of Present Illness on 7/18/2020:    72 y.o. 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 Teresa French in office in January 2020 at which time her creatinine was noted to be around 2 and was thought to be ATN related to sepsis. She also had hypokalemia at this time. There are no other available records to compare her baseline kidney function at present    Past Medical History:        Diagnosis Date    Anemia     Cognitive communication deficit     [facility]ulsions ([facility]     Depressive disorder     Muscle wasting     Osteomyelitis of lumbar spine ([facility]     Sepsis ([facility]        Past Surgical History:    History reviewed. No pertinent surgical history. Home Medications:    No current facility-administered medications on file prior to encounter. Current Outpatient Medications on File Prior to Encounter   Medication Sig Dispense Refill    bismuth subsalicylate (PEPTO BISMOL) 525 MG/15ML suspension Take 30 mLs by mouth every 8 hours as needed for Indigestion      HYDROcodone-acetaminophen (NORCO) 5-325 MG per tablet Take 1 tablet by mouth every 6 hours as needed for Pain.       lactulose (CHRONULAC) 10 GM/15ML solution Take 20 g by mouth daily as needed      Nutritional Supplements (RESOURCE 2.0 PO) Take 120 mLs by mouth 3 times daily      levothyroxine (SYNTHROID) 75 MCG tablet Take 75 mcg by mouth Daily      ARIPiprazole (ABILIFY) 10 MG tablet Take 10 mg by mouth daily      aspirin 81 MG EC tablet Take 81 mg by mouth daily      docusate sodium (COLACE) 100 MG capsule Take 100 mg by mouth 2 times daily      doxycycline hyclate (VIBRAMYCIN) 100 MG capsule Take 100 mg by mouth 2 times daily      apixaban (ELIQUIS) 5 MG TABS tablet Take 5 mg by mouth 2 times daily      ferrous sulfate (FE TABS 325) 325 (65 Fe) MG EC tablet Take 325 mg by mouth 2 times daily      isosorbide mononitrate (IMDUR) 30 MG extended release tablet Take 30 mg by mouth daily      levETIRAcetam (KEPPRA) 500 MG tablet Take 500 mg by mouth 2 times daily      raloxifene (EVISTA) 60 MG tablet Take 60 mg by mouth daily      senna (SENOKOT) 8.6 MG tablet Take 1 tablet by mouth 2 times daily      vitamin C (ASCORBIC ACID) 500 MG tablet Take 500 mg by mouth daily      sertraline (ZOLOFT) 50 MG tablet Take 50 mg by mouth daily      pravastatin (PRAVACHOL) 40 MG tablet Take 40 mg by mouth daily      acetaminophen (TYLENOL) 500 MG tablet Take 500 mg by mouth every 4 hours as needed for Pain      ondansetron (ZOFRAN) 4 MG tablet Take 4 mg by mouth every 6 hours as needed for Nausea or Vomiting         Allergies:  Penicillins    Social History:    Social History     Socioeconomic History    Marital status: Single     Spouse name: Not on file    Number of children: Not on file    Years of education: Not on file    Highest education level: Not on file   Occupational History    Not on file   Social Needs    Financial resource strain: Not on file    Food insecurity     Worry: Not on file     Inability: Not on file    Transportation needs     Medical: Not on file     Non-medical: Not on file   Tobacco Use    Smoking status: Never Smoker    Smokeless tobacco: Never Used   Substance and Sexual Activity    Alcohol use: Not on file    Drug use: Not on file    Sexual activity: Not on file   Lifestyle    Physical activity     Days per week: Not on file     Minutes per session: Not on file    Stress: Not on file   Relationships    Social connections     Talks on phone: Not on file     Gets together: Not on file     Attends Taoism service: Not on file     Active member of club or organization: Not on file     Attends meetings of clubs or organizations: Not on file     Relationship status: Not on file    Intimate partner violence     Fear of current or ex partner: Not on file     Emotionally abused: Not on file     Physically abused: Not on file     Forced sexual activity: Not on file   Other Topics Concern    Not on file   Social History Narrative    Not on file       Family History:   History reviewed. No pertinent family history. Review of Systems: Physical exam:   Constitutional:  VITALS:  BP (!) 168/89   Pulse 78   Temp 97.9 °F (36.6 °C) (Oral)   Resp 16   SpO2 100%     Deferred d/t to covid-19 pandemic to preserve PPEs and avoid exposure. Data/  Recent Labs     07/17/20 1857 07/18/20  0131 07/18/20  0521   WBC 7.4  --  5.1   HGB 7.5* 7.3* 6.5*   HCT 23.6* 24.1* 20.3*   MCV 94.9  --  94.2     --  138     Recent Labs     07/17/20 1857 07/18/20  0521    139   K 4.8 4.7   * 114*   CO2 11* 10*   GLUCOSE 114* 86   PHOS  --  5.5*   BUN 83* 86*   CREATININE 3.8* 3.7*   LABGLOM 12* 12*   GFRAA 14* 15*     CT scan of abdomen pelvis without contrast  Impression    Marked mural thickening of the rectum with perirectal fat stranding,    suggesting proctitis.  That should be followed to resolution to ensure that    there is no underlying neoplasm.         Small bilateral pleural effusions with minimal adjacent atelectasis.         Urinalysis shows more than 300 protein moderate leukocyte esterase, 21-50 WBCs 51 200 RBCs  Fecal occult blood positive    Assessment  -JEN in the setting of hematochezia, no documented hypotension but patient likely has hemodynamically mediated JEN with possibly ATN. Urinalysis is concerning for UTI    Unclear what what her baseline renal function is, she had ATN back in December 2019 when creatinine was 1.9, no subsequent labs to compare  -Severe metabolic acidosis likely related to JEN and unclear if she has diarrhea or not  -Hematochezia  -Acute blood loss anemia getting 1 unit of blood transfusion on 7/18  -Proctitis based on CT scan of abdomen pelvis      Plan  -Change IV fluids with sodium bicarb 150 M EQ per liter of D5W at 125 mL/h  -Agree with blood transfusion  -Recheck labs later today and adjust the IV fluid  -Serial renal panel  -daily wts and strict i/o  -renal dose medications   -avoid nephrotoxins        Thank you for the consultation. Please do not hesitate to call with questions.     Haroldo Smith  [facility]  Office:   Fax:    [phone]

## 2020-07-18 NOTE — PROGRESS NOTES
Physician Progress Note      Russ Saab  CSN #:                  [id number]  :                       1950  ADMIT DATE:       2020 6:13 PM  [address] [place] DATE:  RESPONDING  PROVIDER #:        Ritu Pierre MD          QUERY TEXT:    Pt admitted with GI bleed and JEN and has anemia documented. If possible,   please document in progress notes and discharge summary further specificity   regarding the acuity and type of anemia:    The medical record reflects the following:  Risk Factors: GI bleed, JEN, acodpsos  Clinical Indicators: H/H 7.5/23.6 on admission, 6.5/20.3 on . Treatment: H&H every 6 hours, Protonix IV twice daily. Trend hemoglobins, may   need GI consultation. N.p.o. at midnight. Options provided:  -- Anemia due to acute blood loss  -- Anemia due to chronic blood loss  -- Anemia due to iron deficiency  -- Anemia due to chronic disease  -- Dilutional anemia  -- Other - I will add my own diagnosis  -- Dismiss - Clinically unable to determine / Unknown  -- Refer to Clinical Documentation Reviewer    PROVIDER RESPONSE TEXT:    This patient has acute blood loss anemia.     Query created by: Anai Ceja on 2020 7:32 AM      Electronically signed by:  Ritu Pierre MD 2020 11:04 AM

## 2020-07-18 NOTE — PLAN OF CARE
Problem: Falls - Risk of:  Goal: Will remain free from falls  Description: Will remain free from falls  7/18/2020 1030 by Verner Ahumada  Outcome: Ongoing  7/18/2020 0203 by Casper Wood RN  Outcome: Ongoing  Goal: Absence of physical injury  Description: Absence of physical injury  7/18/2020 1030 by Verner Ahumada  Outcome: Ongoing  7/18/2020 0203 by Casper Wood RN  Outcome: Ongoing     Problem: Infection:  Goal: Will remain free from infection  Description: Will remain free from infection  7/18/2020 1030 by Verner Ahumada  Outcome: Ongoing  7/18/2020 0203 by Casper Wood RN  Outcome: Ongoing     Problem: Safety:  Goal: Free from accidental physical injury  Description: Free from accidental physical injury  7/18/2020 1030 by Verner Ahumada  Outcome: Ongoing  7/18/2020 0203 by Casper Wood RN  Outcome: Ongoing  Goal: Free from intentional harm  Description: Free from intentional harm  7/18/2020 1030 by Verner Ahumada  Outcome: Ongoing  7/18/2020 0203 by Casper Wood RN  Outcome: Ongoing     Problem: Daily Care:  Goal: Daily care needs are met  Description: Daily care needs are met  7/18/2020 1030 by Verner Ahumada  Outcome: Ongoing  7/18/2020 0203 by Casper Wood RN  Outcome: Ongoing     Problem: Pain:  Goal: Patient's pain/discomfort is manageable  Description: Patient's pain/discomfort is manageable  7/18/2020 1030 by Verner Ahumada  Outcome: Ongoing  7/18/2020 0203 by Casper Wood RN  Outcome: Ongoing     Problem: Skin Integrity:  Goal: Skin integrity will stabilize  Description: Skin integrity will stabilize  7/18/2020 1030 by Verner Ahumada  Outcome: Ongoing  7/18/2020 0203 by Casper Wood RN  Outcome: Ongoing     Problem: Discharge Planning:  Goal: Patients continuum of care needs are met  Description: Patients continuum of care needs are met  7/18/2020 1030 by Verner Ahumada  Outcome: Ongoing  7/18/2020 0203 by Casper Wood RN  Outcome: Ongoing

## 2020-07-18 NOTE — H&P
Hospital Medicine History & Physical      PCP: Cate Mcclain MD    Date of Admission: 7/17/2020    Date of Service: Pt seen/examined on 7/17/2020    Chief Complaint: Abnormal labs    History Of Present Illness:    72 y.o. female who presented to the hospital from the nursing home facility for abnormal lab work. She was sent in because blood work from the nursing home showed she had a hemoglobin of 6.6. The patient is a DNR CC and has Alzheimer's dementia so history was very difficult to obtain from her. Over the past week the patient endorses that she has been having nausea and increased vomiting. She endorses decreased p.o. intake and being sick to her stomach. In the ER blood work showed a hemoglobin of 7.5, she however did have pretty significant JEN with profound metabolic acidosis. She also had a tarry bowel movement that was occult positive. She is on Eliquis, it is unclear why. Nursing home paperwork states for DVT prophylaxis. She will be admitted for further medical evaluation and treatment. Past Medical History:        Diagnosis Date    Anemia     Cognitive communication deficit     Convulsions ([facility] [address] [facility]     Depressive disorder     Muscle wasting     Osteomyelitis of lumbar spine (HCC)     Sepsis ([facility] [address] [facility]        Past Surgical History:    No past surgical history on file. Medications Prior to Admission:   Reviewed medication list from nursing facility. Allergies:  Penicillins    Social History:      TOBACCO:   has no history on file for tobacco.  ETOH:   has no history on file for alcohol. Family History:    No family history on file. REVIEW OF SYSTEMS:   Constitutional:  Negative for fever,chills or night sweats  ENT:  Negative for rhinorrhea, epistaxis, hoarseness, sore throat.   Respiratory: Negative for SOB or wheezing   Cardiovascular:   Negative for  chest pain, palpitations   Gastrointestinal:   Positive for nausea and vomiting  Genitourinary:  Negative for polyuria, dysuria   Hematologic/Lymphatic:  Negative for  bleeding tendency, easy bruising  Musculoskeletal:  Negative for myalgias and arthralgias  Neurologic:  Negative for  confusion,dysarthria. Skin:  Negative for itching,rash  Psychiatric:  Negative for depression,anxiety, agitation. Endocrine:  Negative for polydipsia,polyuria,heat /cold intolerance. PHYSICAL EXAM:  BP (!) 157/87   Pulse 88   Temp 98.8 °F (37.1 °C) (Oral)   Resp 19   SpO2 99%   General appearance:  No apparent distress, alert to self and place  HEENT:  Normal cephalic, atraumatic without obvious deformity. Pupils equal, round, and reactive to light. Extra ocular muscles intact. Conjunctivae/corneas clear. Neck: Supple, with full range of motion. No jugular venous distention. Trachea midline. Respiratory:  Normal respiratory effort. Clear to auscultation, bilaterally without Rales/Wheezes/Rhonchi. Cardiovascular:  Regular rate and rhythm with normal S1/S2 without murmurs, rubs or gallops. Abdomen: Soft, non-tender, non-distended with normal bowel sounds. Musculoskeletal:  No clubbing, cyanosis or edema bilaterally. Full range of motion without deformity. Skin: Skin color, texture, turgor normal.  No rashes or lesions. Neurologic:  Neurovascularly intact without any focal sensory/motor deficits. Cranial nerves: II-XII intact, grossly non-focal.  Psychiatric:  Alert and oriented, thought content appropriate, normal insight  Capillary Refill: Brisk,< 3 seconds   Peripheral Pulses: +2 palpable, equal bilaterally       Labs:   Recent Labs     07/17/20 1857   WBC 7.4   HGB 7.5*   HCT 23.6*        Recent Labs     07/17/20 1857      K 4.8   *   CO2 11*   BUN 83*   CREATININE 3.8*   CALCIUM 8.1*     Recent Labs     07/17/20 1857   AST 19   ALT 11   BILITOT <0.2   ALKPHOS 147*     Recent Labs     07/17/20 1857   INR 1.68*     No results for input(s): Senia Grumet in the last 72 hours.     Urinalysis:      Lab

## 2020-07-18 NOTE — PROGRESS NOTES
Admitted from ER to [address] [address] in stable condition. Disaster admission navigator completed per P&P as patient is in Droplet Plus isolation for COVID-19 rule out. Skin intact with scattered bruises. Patient alert to person & place. Orientation provided. Instructed to call for assist before attempting out of bed & patient verbalized good understanding. Bed alarm on for patient's safety. Patient is able to demonstrated the ability to move from a reclining position to an upright position within the recliner. Call in easy reach. Continue to monitor closely.   Dionicio Saravia RN

## 2020-07-18 NOTE — ED NOTES
CT is aware the the cat scan will be non contrast now, per Dr. Jazzmine Gaines.       Vineet Gentile RN  07/17/20 2002

## 2020-07-19 LAB
ALBUMIN SERPL-MCNC: 2.1 G/DL (ref 3.4–5)
ANION GAP SERPL CALCULATED.3IONS-SCNC: 13 MMOL/L (ref 3–16)
BASOPHILS ABSOLUTE: 0 K/UL (ref 0–0.2)
BASOPHILS RELATIVE PERCENT: 0.4 %
BUN BLDV-MCNC: 80 MG/DL (ref 7–20)
C DIFF TOXIN/ANTIGEN: NORMAL
CALCIUM SERPL-MCNC: 7.5 MG/DL (ref 8.3–10.6)
CHLORIDE BLD-SCNC: 102 MMOL/L (ref 99–110)
CO2: 20 MMOL/L (ref 21–32)
CREAT SERPL-MCNC: 3.5 MG/DL (ref 0.6–1.2)
CRYPTOSPORIDIUM ANTIGEN STOOL: NORMAL
E HISTOLYTICA ANTIGEN STOOL: NORMAL
EOSINOPHILS ABSOLUTE: 0 K/UL (ref 0–0.6)
EOSINOPHILS RELATIVE PERCENT: 0.4 %
GFR AFRICAN AMERICAN: 16
GFR NON-AFRICAN AMERICAN: 13
GI BACTERIAL PATHOGENS BY PCR: NORMAL
GIARDIA ANTIGEN STOOL: NORMAL
GLUCOSE BLD-MCNC: 150 MG/DL (ref 70–99)
HCT VFR BLD CALC: 25.4 % (ref 36–48)
HCT VFR BLD CALC: 27.9 % (ref 36–48)
HCT VFR BLD CALC: 28.7 % (ref 36–48)
HCT VFR BLD CALC: 30.5 % (ref 36–48)
HEMOGLOBIN: 10.1 G/DL (ref 12–16)
HEMOGLOBIN: 8.3 G/DL (ref 12–16)
HEMOGLOBIN: 9.3 G/DL (ref 12–16)
HEMOGLOBIN: 9.6 G/DL (ref 12–16)
LYMPHOCYTES ABSOLUTE: 0.7 K/UL (ref 1–5.1)
LYMPHOCYTES RELATIVE PERCENT: 10.4 %
MCH RBC QN AUTO: 30.4 PG (ref 26–34)
MCHC RBC AUTO-ENTMCNC: 33.5 G/DL (ref 31–36)
MCV RBC AUTO: 90.6 FL (ref 80–100)
MONOCYTES ABSOLUTE: 0.6 K/UL (ref 0–1.3)
MONOCYTES RELATIVE PERCENT: 9.9 %
NEUTROPHILS ABSOLUTE: 5 K/UL (ref 1.7–7.7)
NEUTROPHILS RELATIVE PERCENT: 78.9 %
ORGANISM: ABNORMAL
PDW BLD-RTO: 15.8 % (ref 12.4–15.4)
PHOSPHORUS: 4.8 MG/DL (ref 2.5–4.9)
PLATELET # BLD: 140 K/UL (ref 135–450)
PMV BLD AUTO: 8.1 FL (ref 5–10.5)
POTASSIUM SERPL-SCNC: 3.6 MMOL/L (ref 3.5–5.1)
RBC # BLD: 3.08 M/UL (ref 4–5.2)
SODIUM BLD-SCNC: 135 MMOL/L (ref 136–145)
URINE CULTURE, ROUTINE: ABNORMAL
URINE CULTURE, ROUTINE: ABNORMAL
WBC # BLD: 6.3 K/UL (ref 4–11)
WHITE BLOOD CELLS (WBC), STOOL: NORMAL

## 2020-07-19 PROCEDURE — 80069 RENAL FUNCTION PANEL: CPT

## 2020-07-19 PROCEDURE — 2580000003 HC RX 258: Performed by: INTERNAL MEDICINE

## 2020-07-19 PROCEDURE — 85014 HEMATOCRIT: CPT

## 2020-07-19 PROCEDURE — 85025 COMPLETE CBC W/AUTO DIFF WBC: CPT

## 2020-07-19 PROCEDURE — 6370000000 HC RX 637 (ALT 250 FOR IP): Performed by: INTERNAL MEDICINE

## 2020-07-19 PROCEDURE — 1200000000 HC SEMI PRIVATE

## 2020-07-19 PROCEDURE — 87505 NFCT AGENT DETECTION GI: CPT

## 2020-07-19 PROCEDURE — 36415 COLL VENOUS BLD VENIPUNCTURE: CPT

## 2020-07-19 PROCEDURE — 2500000003 HC RX 250 WO HCPCS: Performed by: INTERNAL MEDICINE

## 2020-07-19 PROCEDURE — 87328 CRYPTOSPORIDIUM AG IA: CPT

## 2020-07-19 PROCEDURE — 83630 LACTOFERRIN FECAL (QUAL): CPT

## 2020-07-19 PROCEDURE — C9113 INJ PANTOPRAZOLE SODIUM, VIA: HCPCS | Performed by: INTERNAL MEDICINE

## 2020-07-19 PROCEDURE — 2580000003 HC RX 258

## 2020-07-19 PROCEDURE — 99232 SBSQ HOSP IP/OBS MODERATE 35: CPT | Performed by: INTERNAL MEDICINE

## 2020-07-19 PROCEDURE — 87324 CLOSTRIDIUM AG IA: CPT

## 2020-07-19 PROCEDURE — 6360000002 HC RX W HCPCS: Performed by: INTERNAL MEDICINE

## 2020-07-19 PROCEDURE — 85018 HEMOGLOBIN: CPT

## 2020-07-19 PROCEDURE — 87336 ENTAMOEB HIST DISPR AG IA: CPT

## 2020-07-19 PROCEDURE — 87449 NOS EACH ORGANISM AG IA: CPT

## 2020-07-19 RX ORDER — SODIUM CHLORIDE, SODIUM LACTATE, POTASSIUM CHLORIDE, CALCIUM CHLORIDE 600; 310; 30; 20 MG/100ML; MG/100ML; MG/100ML; MG/100ML
INJECTION, SOLUTION INTRAVENOUS CONTINUOUS
Status: DISCONTINUED | OUTPATIENT
Start: 2020-07-19 | End: 2020-07-21

## 2020-07-19 RX ORDER — AMLODIPINE BESYLATE 5 MG/1
5 TABLET ORAL DAILY
Status: DISCONTINUED | OUTPATIENT
Start: 2020-07-19 | End: 2020-07-25

## 2020-07-19 RX ORDER — SODIUM CHLORIDE 9 MG/ML
INJECTION, SOLUTION INTRAVENOUS
Status: COMPLETED
Start: 2020-07-19 | End: 2020-07-19

## 2020-07-19 RX ADMIN — METRONIDAZOLE 500 MG: 500 INJECTION, SOLUTION INTRAVENOUS at 00:12

## 2020-07-19 RX ADMIN — SODIUM CHLORIDE 1000 ML: 9 INJECTION, SOLUTION INTRAVENOUS at 08:08

## 2020-07-19 RX ADMIN — RALOXIFENE HYDROCHLORIDE 60 MG: 60 TABLET, FILM COATED ORAL at 08:14

## 2020-07-19 RX ADMIN — METRONIDAZOLE 500 MG: 500 INJECTION, SOLUTION INTRAVENOUS at 23:23

## 2020-07-19 RX ADMIN — SERTRALINE 50 MG: 50 TABLET, FILM COATED ORAL at 08:14

## 2020-07-19 RX ADMIN — LEVETIRACETAM 500 MG: 500 TABLET ORAL at 08:15

## 2020-07-19 RX ADMIN — Medication: at 08:14

## 2020-07-19 RX ADMIN — SODIUM CHLORIDE, SODIUM LACTATE, POTASSIUM CHLORIDE, AND CALCIUM CHLORIDE: .6; .31; .03; .02 INJECTION, SOLUTION INTRAVENOUS at 16:42

## 2020-07-19 RX ADMIN — PANTOPRAZOLE SODIUM 40 MG: 40 INJECTION, POWDER, FOR SOLUTION INTRAVENOUS at 20:41

## 2020-07-19 RX ADMIN — Medication 10 ML: at 08:15

## 2020-07-19 RX ADMIN — AMLODIPINE BESYLATE 5 MG: 5 TABLET ORAL at 16:41

## 2020-07-19 RX ADMIN — METRONIDAZOLE 500 MG: 500 INJECTION, SOLUTION INTRAVENOUS at 08:11

## 2020-07-19 RX ADMIN — CEFTRIAXONE SODIUM 1 G: 1 INJECTION, POWDER, FOR SOLUTION INTRAMUSCULAR; INTRAVENOUS at 18:43

## 2020-07-19 RX ADMIN — ISOSORBIDE MONONITRATE 30 MG: 30 TABLET ORAL at 08:15

## 2020-07-19 RX ADMIN — PRAVASTATIN SODIUM 40 MG: 40 TABLET ORAL at 20:42

## 2020-07-19 RX ADMIN — Medication 10 ML: at 20:41

## 2020-07-19 RX ADMIN — METRONIDAZOLE 500 MG: 500 INJECTION, SOLUTION INTRAVENOUS at 16:42

## 2020-07-19 RX ADMIN — LEVETIRACETAM 500 MG: 500 TABLET ORAL at 20:42

## 2020-07-19 RX ADMIN — ONDANSETRON HYDROCHLORIDE 4 MG: 2 INJECTION, SOLUTION INTRAMUSCULAR; INTRAVENOUS at 02:58

## 2020-07-19 RX ADMIN — PANTOPRAZOLE SODIUM 40 MG: 40 INJECTION, POWDER, FOR SOLUTION INTRAVENOUS at 09:00

## 2020-07-19 NOTE — CARE COORDINATION
Case Management Assessment  Initial Evaluation    Date/Time of Evaluation: 7/19/2020 4:44 PM  Assessment Completed by: Vitaliy Blanco    Patient Name: Netta Iglesias  YOB: 1950  Diagnosis: Acute kidney injury [facility]) [N17.9]  Date / Time: 7/17/2020  6:13 PM  Admission status/Date: INPT 7/17/2020  Chart Reviewed: Yes      Patient Interviewed: No   Family Interviewed:  Yes - Mark Nip, sister      Hospitalization in the last 30 days:  No    Contacts  :     Relationship to Patient:   Phone Number:    Alternate Contact:     Relationship to Patient:     Phone Number:    Met with:    Current PCP Edson Cooper: [address] required for SNF : Y, N        3 night stay required - Y, [facility]:    Transportation: EMS transportation    Meal Preparation: per [employer] Environment: from [place] [address]  Steps: n/a  Plans to Return to Present Housing: Yes  Other Identified Issues: -    Andrea Black  Currently active with [address] : No     Passport/Waiver : No  :                      Phone Number:    Passport/Waiver Services: [address] Provider: per snf  Equipment: per snf  Walker___Cane___RTS___ BSC___Shower Chair___  02__ at ____Liter(s)---Uses________HHN___ CPAP___ BiPap___ Hospital Bed___W/C____Other________      Has Home O2 in place on admit:  Per snf    If above answer is No ---is pt presently on O2 during hospitalization:  No  if yes CM to follow for potential DC O2 need  Informed of need to bring portable home O2 tank on day of discharge for nursing to connect prior to leaving:   Not Indicated  Verbalized agreement/Understanding:   Not Indicated    Community Service Affiliation  Dialysis:  No    · Name:  · Location  · Dialysis Schedule:  · Phone:   · Fax:     Outpatient PT/OT: No    Cancer Center: No     CHF Clinic: No     Pulmonary Rehab: No  Pain Clinic: No  Community Mental Health: No    Wound Clinic: No     COVID SCREENING: Yes - pending       Other: n/a    The Plan for Transition of Care is related to the following treatment goals: return to [facility]: reviewed chart and spoke with pt sister, Zoraida Lanier, via telephone. Fang Card states that pt lives at [facility] and is to return there at discharge. CM spoke with Adriana Zamudio at [facility] who states that they plan to accept pt back at discharge pending COVID-19 results. Will follow. Explained Case Management role/services.

## 2020-07-19 NOTE — PROGRESS NOTES
Am assessment completed. See flowsheet. pt c/o nausea at this time. Medicated with scheduled protonix. See mar. Pt refusing breakfast at this time. Pt took am meds without difficulty. Pt up to bsc at this time. Bed alarm on for safety and call light within reach. Gregorio Huggins RN\

## 2020-07-19 NOTE — PROGRESS NOTES
[facility]    Follow-up note           Reason for Consult: JEN  Requesting Physician:  Dr. Brenna Sarmiento for    Sub/interval history  Renal function improving  No fever  Blood pressure high    Last 24 h uop incontinent and not charted      ROS: No fever  PSFH: No visitor    Scheduled Meds:   sodium chloride flush  10 mL Intravenous 2 times per day    cefTRIAXone (ROCEPHIN) IV  1 g Intravenous Q24H    metroNIDAZOLE  500 mg Intravenous Q8H    levETIRAcetam  500 mg Oral BID    raloxifene  60 mg Oral Daily    isosorbide mononitrate  30 mg Oral Daily    sertraline  50 mg Oral Daily    pravastatin  40 mg Oral Nightly    pantoprazole  40 mg Intravenous BID     Continuous Infusions:   sodium bicarbonate infusion 125 mL/hr at 07/19/20 0814     PRN Meds:.sodium chloride flush, acetaminophen **OR** acetaminophen, promethazine **OR** ondansetron    History of Present Illness on 7/18/2020:    72 y.o. 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 [place] Bear in office in January 2020 at which time her creatinine was noted to be around 2 and was thought to be ATN related to sepsis. She also had hypokalemia at this time.   There are no other available records to compare her baseline kidney function at present    Review of Systems: Physical exam:   Constitutional:  VITALS:  BP (!) 161/100   Pulse 92   Temp 97.1 °F (36.2 °C) (Oral)   Resp 16   Wt 98 lb 6.4 oz (44.6 kg)   SpO2 98%     Deferred d/t to covid-19 pandemic to preserve PPEs and avoid exposure. Data/  Recent Labs     07/17/20  1857  07/18/20  0521  07/19/20  0129 07/19/20  0617 07/19/20  1417   WBC 7.4  --  5.1  --   --  6.3  --    HGB 7.5*   < > 6.5*   < > 8.3* 9.3* 10.1*   HCT 23.6*   < > 20.3*   < > 25.4* 27.9* 30.5*   MCV 94.9  --  94.2  --   --  90.6  --      --  138  --   --  140  --     < > = values in this interval not displayed. Recent Labs     07/18/20  0521 07/18/20  1438 07/19/20  0617    140 135*   K 4.7 4.3 3.6   * 113* 102   CO2 10* 13* 20*   GLUCOSE 86 111* 150*   PHOS 5.5* 5.3* 4.8   BUN 86* 83* 80*   CREATININE 3.7* 3.7* 3.5*   LABGLOM 12* 12* 13*   GFRAA 15* 15* 16*     CT scan of abdomen pelvis without contrast  Impression    Marked mural thickening of the rectum with perirectal fat stranding,    suggesting proctitis.  That should be followed to resolution to ensure that    there is no underlying neoplasm.         Small bilateral pleural effusions with minimal adjacent atelectasis. Urinalysis shows more than 300 protein moderate leukocyte esterase, 21-50 WBCs 51 200 RBCs  Fecal occult blood positive    Assessment  -JEN in the setting of hematochezia, no documented hypotension but patient likely has hemodynamically mediated JEN with possibly ATN.   Urinalysis is concerning for UTI    Unclear what what her baseline renal function is, she had ATN back in December 2019 when creatinine was 1.9, no subsequent labs to compare    -Proteus mirabilis UTI on ceftriaxone    -Severe metabolic acidosis likely related to JEN and unclear if she has diarrhea or not  -Hematochezia  -Acute blood loss anemia getting 1 unit of blood transfusion on 7/18  -Proctitis based on CT scan of abdomen pelvis      Plan  -Change IV fluid to LR at 100 mL/h  -Amlodipine 5 mg daily  Continue Imdur 30 mg daily  -Serial renal panel  -Agree with [place] catheter placement  -daily wts and strict i/o  -renal dose medications   -avoid nephrotoxins        Thank you for the consultation. Please do not hesitate to call with questions.     Zulema Shankar  [facility]  Office: [phone]  Fax:    [phone]

## 2020-07-19 NOTE — PROGRESS NOTES
Progress Note    Admit Date:  7/17/2020    Subjective:  Ms. Margi Pabon not a very good historian. She does have some lower abdominal pain and has had some rectal bleeding. No fever or chills. Baseline creatinine is not known but admission creatinine was 3.8. Since she came from the nursing home she is a COVID-19 rule out. No chest pain or shortness of breath. 7/19- she is incontinent of urine. UO not measured. No fever. Feels about the same. No blood in her stool. Objective:   BP (!) 150/98   Pulse 92   Temp 98 °F (36.7 °C) (Oral)   Resp 16   Wt 98 lb 6.4 oz (44.6 kg)   SpO2 98%          Intake/Output Summary (Last 24 hours) at 7/19/2020 1120  Last data filed at 7/19/2020 1044  Gross per 24 hour   Intake 31503 ml   Output --   Net 97288 ml       Physical Exam:    General appearance: alert, appears stated age and cooperative  Head: Normocephalic, without obvious abnormality, atraumatic  Eyes: conjunctivae/corneas clear. PERRL, EOM's intact.   Neck: no adenopathy, no carotid bruit, no JVD, supple, symmetrical, trachea midline and thyroid not enlarged, symmetric, no tenderness/mass/nodules  Lungs: clear to auscultation bilaterally  Heart: regular rate and rhythm, S1, S2 normal, no murmur, click, rub or gallop  Abdomen: soft, non-tender; bowel sounds normal; no masses,  no organomegaly  Extremities: extremities normal, atraumatic, no cyanosis or edema  Pulses: 2+ and symmetric  Skin: Skin color, texture, turgor normal. No rashes or lesions  Neurologic: Grossly normal    Scheduled Meds:   sodium chloride flush  10 mL Intravenous 2 times per day    cefTRIAXone (ROCEPHIN) IV  1 g Intravenous Q24H    metroNIDAZOLE  500 mg Intravenous Q8H    levETIRAcetam  500 mg Oral BID    raloxifene  60 mg Oral Daily    isosorbide mononitrate  30 mg Oral Daily    sertraline  50 mg Oral Daily    pravastatin  40 mg Oral Nightly    pantoprazole  40 mg Intravenous BID       Continuous Infusions:   sodium bicarbonate infusion 125 mL/hr at 07/19/20 0814       PRN Meds:  sodium chloride flush, acetaminophen **OR** acetaminophen, promethazine **OR** ondansetron      Data:  CBC:   Recent Labs     07/17/20  1857 07/18/20  0521  07/18/20 2003 07/19/20  0129 07/19/20  0617   WBC 7.4  --  5.1  --   --   --  6.3   HGB 7.5*   < > 6.5*   < > 8.4* 8.3* 9.3*   HCT 23.6*   < > 20.3*   < > 25.2* 25.4* 27.9*   MCV 94.9  --  94.2  --   --   --  90.6     --  138  --   --   --  140    < > = values in this interval not displayed. BMP:   Recent Labs     07/18/20  0521 07/18/20  1438 07/19/20  0617    140 135*   K 4.7 4.3 3.6   * 113* 102   CO2 10* 13* 20*   PHOS 5.5* 5.3* 4.8   BUN 86* 83* 80*   CREATININE 3.7* 3.7* 3.5*     LIVER PROFILE:   Recent Labs     07/17/20 1857   AST 19   ALT 11   BILITOT <0.2   ALKPHOS 147*     PT/INR:   Recent Labs     07/17/20 1857   PROTIME 19.6*   INR 1.68*     Cultures: Results for Tracy Christiansen (MRN [id number]) as of 7/19/2020 11:10   Ref. Range 7/19/2020 03:10   White Blood Cells (WBC), Stool Unknown Negative. ..   C DIFF TOXIN/ANTIGEN Unknown Rpt   GI Bacterial Pathogens By PCR Unknown No Shigella spp/E. .. Cryptosporidium Ag Unknown Negative. ..   E Histolytica Ag Unknown Negative. .. Giardia Ag, Stl Unknown Negative. .. C.diff Toxin/Antigen Unknown Negative for Clos. .. Susceptibility     Proteus mirabilis (1)     Antibiotic  Interpretation  NAN  Status    ampicillin  Sensitive  <=8  mcg/mL     ceFAZolin  Sensitive  <=2  mcg/mL     cefepime  Sensitive  <=2  mcg/mL     cefTRIAXone  Sensitive  <=1  mcg/mL     cefuroxime  Sensitive  <=4  mcg/mL     ciprofloxacin  Resistant  >2  mcg/mL     ertapenem  Sensitive  <=0.5  mcg/mL     gentamicin  Sensitive  <=4  mcg/mL     meropenem  Sensitive  <=1  mcg/mL     nitrofurantoin  Resistant  64  mcg/mL     piperacillin-tazobactam  Sensitive  <=16  mcg/mL     trimethoprim-sulfamethoxazole  Sensitive  <=2/38  mcg/mL         COVID 19 pending.     CT ABDOMEN PELVIS WO CONTRAST Additional Contrast? None   Final Result   Marked mural thickening of the rectum with perirectal fat stranding,   suggesting proctitis. That should be followed to resolution to ensure that   there is no underlying neoplasm. Small bilateral pleural effusions with minimal adjacent atelectasis. Assessment:  Principal Problem:    JEN (acute kidney injury) ([facility] [address]  Active Problems:    Seizures ([facility] [address]    Acquired hypothyroidism    GI bleed    Proctitis    Acute blood loss anemia    Metabolic acidosis  Resolved Problems:    * No resolved hospital problems. *      Plan:    #Patient came to ER with abnormal labs. Baseline labs not known. Work-up shows elevated creatinine. Baseline creatinine not known. Possible JEN. Start IV fluids. Nephrology consultation. She has non-anion gap metabolic acidosis. She is on IV bicarb. Creatinine slightly improved. #Acute cystitis without hematuria. Urine cultures growing Proteus. Already on Rocephin. #COVID-19 rule out since she came from a nursing home. #Proctitis. Start on empiric Rocephin and Flagyl. GI consultation has been obtained. #GI bleed. She has had some rectal bleeding. Her H&H is low. She is guaiac positive. She got one unit of PRBC. H and H stable. GI work up after COVID test is back. #Acute blood loss anemia due to above. She will need an EGD and colonoscopy once she is ruled out for COVID and is medically more stable. #Continue Keppra for possible seizure disorder. #Continue Synthroid for hypothyroidism. #SCD for DVT prophylaxis.     Ritu Pierre MD 7/19/2020 11:20 AM

## 2020-07-19 NOTE — PROGRESS NOTES
PROGRESS NOTE  S:70 yrs Patient  admitted on 7/17/2020 with Acute kidney injury ([facility] [address] [N17.9] . Today she denies any further bleeding. She continues to have diarrhea. Stool tests negative so far    Exam:   Vitals:    07/19/20 0802   BP: (!) 150/98   Pulse: 92   Resp: 16   Temp: 98 °F (36.7 °C)   SpO2: 98%     Due to the current efforts to prevent transmission of COVID-19 and also the need to preserve PPE for other caregivers, a face-to-face encounter with the patient was not performed. That being said, all relevant records and diagnostic tests were reviewed, including laboratory results and imaging. Please reference any relevant documentation elsewhere. Care will be coordinated with the primary service. Medications: Reviewed    Labs:  CBC:   Recent Labs     07/17/20 1857 07/18/20 0521 07/18/20 2003 07/19/20  0129 07/19/20  0617   WBC 7.4  --  5.1  --   --   --  6.3   HGB 7.5*   < > 6.5*   < > 8.4* 8.3* 9.3*   HCT 23.6*   < > 20.3*   < > 25.2* 25.4* 27.9*   MCV 94.9  --  94.2  --   --   --  90.6     --  138  --   --   --  140    < > = values in this interval not displayed. BMP:   Recent Labs     07/18/20  0521 07/18/20  1438 07/19/20  0617    140 135*   K 4.7 4.3 3.6   * 113* 102   CO2 10* 13* 20*   PHOS 5.5* 5.3* 4.8   BUN 86* 83* 80*   CREATININE 3.7* 3.7* 3.5*     LIVER PROFILE:   Recent Labs     07/17/20 1857   AST 19   ALT 11   PROT 5.5*   BILITOT <0.2   ALKPHOS 147*     PT/INR:   Recent Labs     07/17/20 1857   INR 1.68*       Impression:72year old female with history of hypothyroidism, DVT, cardiac pacemaker, HLD, seizure do, osteomyelitis, depression, and dementia admitted with anemia and proctitis. Differential includes ulcerative proctitis, stercoral ulcers, less likely PUD and malignancy    Recommendation:  1. Continue supportive care  2. Monitor Hgb  3. Observe for signs of bleeding  4. Continue antibiotics  5.  PPI BID  6. EGD+Colonoscopy once COVID ruled out      Zena Antonio MD  11:06 AM 7/19/2020

## 2020-07-20 LAB
ALBUMIN SERPL-MCNC: 2 G/DL (ref 3.4–5)
ANION GAP SERPL CALCULATED.3IONS-SCNC: 13 MMOL/L (ref 3–16)
BASOPHILS ABSOLUTE: 0 K/UL (ref 0–0.2)
BASOPHILS RELATIVE PERCENT: 0.6 %
BUN BLDV-MCNC: 71 MG/DL (ref 7–20)
CALCIUM SERPL-MCNC: 7.5 MG/DL (ref 8.3–10.6)
CHLORIDE BLD-SCNC: 105 MMOL/L (ref 99–110)
CO2: 21 MMOL/L (ref 21–32)
CREAT SERPL-MCNC: 3.7 MG/DL (ref 0.6–1.2)
EKG ATRIAL RATE: 92 BPM
EKG DIAGNOSIS: NORMAL
EKG P AXIS: 11 DEGREES
EKG P-R INTERVAL: 188 MS
EKG Q-T INTERVAL: 366 MS
EKG QRS DURATION: 64 MS
EKG QTC CALCULATION (BAZETT): 452 MS
EKG R AXIS: -7 DEGREES
EKG T AXIS: 19 DEGREES
EKG VENTRICULAR RATE: 92 BPM
EOSINOPHILS ABSOLUTE: 0.1 K/UL (ref 0–0.6)
EOSINOPHILS RELATIVE PERCENT: 0.9 %
GFR AFRICAN AMERICAN: 15
GFR NON-AFRICAN AMERICAN: 12
GLUCOSE BLD-MCNC: 108 MG/DL (ref 70–99)
HCT VFR BLD CALC: 26.5 % (ref 36–48)
HCT VFR BLD CALC: 29 % (ref 36–48)
HCT VFR BLD CALC: 29.7 % (ref 36–48)
HCT VFR BLD CALC: 31.8 % (ref 36–48)
HEMOGLOBIN: 10.1 G/DL (ref 12–16)
HEMOGLOBIN: 10.8 G/DL (ref 12–16)
HEMOGLOBIN: 8.9 G/DL (ref 12–16)
HEMOGLOBIN: 9.9 G/DL (ref 12–16)
LYMPHOCYTES ABSOLUTE: 1 K/UL (ref 1–5.1)
LYMPHOCYTES RELATIVE PERCENT: 15.6 %
MCH RBC QN AUTO: 31.3 PG (ref 26–34)
MCHC RBC AUTO-ENTMCNC: 34 G/DL (ref 31–36)
MCV RBC AUTO: 92.3 FL (ref 80–100)
MONOCYTES ABSOLUTE: 0.6 K/UL (ref 0–1.3)
MONOCYTES RELATIVE PERCENT: 9.8 %
NEUTROPHILS ABSOLUTE: 4.8 K/UL (ref 1.7–7.7)
NEUTROPHILS RELATIVE PERCENT: 73.1 %
PDW BLD-RTO: 16.4 % (ref 12.4–15.4)
PHOSPHORUS: 5.4 MG/DL (ref 2.5–4.9)
PLATELET # BLD: 138 K/UL (ref 135–450)
PMV BLD AUTO: 7.7 FL (ref 5–10.5)
POTASSIUM SERPL-SCNC: 3.6 MMOL/L (ref 3.5–5.1)
RBC # BLD: 3.22 M/UL (ref 4–5.2)
REPORT: NORMAL
SARS-COV-2: NOT DETECTED
SODIUM BLD-SCNC: 139 MMOL/L (ref 136–145)
THIS TEST SENT TO: NORMAL
WBC # BLD: 6.6 K/UL (ref 4–11)

## 2020-07-20 PROCEDURE — 2580000003 HC RX 258: Performed by: INTERNAL MEDICINE

## 2020-07-20 PROCEDURE — 80069 RENAL FUNCTION PANEL: CPT

## 2020-07-20 PROCEDURE — C9113 INJ PANTOPRAZOLE SODIUM, VIA: HCPCS | Performed by: INTERNAL MEDICINE

## 2020-07-20 PROCEDURE — 36415 COLL VENOUS BLD VENIPUNCTURE: CPT

## 2020-07-20 PROCEDURE — 6370000000 HC RX 637 (ALT 250 FOR IP): Performed by: INTERNAL MEDICINE

## 2020-07-20 PROCEDURE — 99232 SBSQ HOSP IP/OBS MODERATE 35: CPT | Performed by: INTERNAL MEDICINE

## 2020-07-20 PROCEDURE — 85018 HEMOGLOBIN: CPT

## 2020-07-20 PROCEDURE — 85025 COMPLETE CBC W/AUTO DIFF WBC: CPT

## 2020-07-20 PROCEDURE — 85014 HEMATOCRIT: CPT

## 2020-07-20 PROCEDURE — 93010 ELECTROCARDIOGRAM REPORT: CPT | Performed by: INTERNAL MEDICINE

## 2020-07-20 PROCEDURE — 1200000000 HC SEMI PRIVATE

## 2020-07-20 PROCEDURE — 2500000003 HC RX 250 WO HCPCS: Performed by: INTERNAL MEDICINE

## 2020-07-20 PROCEDURE — 6360000002 HC RX W HCPCS: Performed by: INTERNAL MEDICINE

## 2020-07-20 PROCEDURE — 93005 ELECTROCARDIOGRAM TRACING: CPT | Performed by: INTERNAL MEDICINE

## 2020-07-20 RX ADMIN — Medication 10 ML: at 07:51

## 2020-07-20 RX ADMIN — SODIUM CHLORIDE, SODIUM LACTATE, POTASSIUM CHLORIDE, AND CALCIUM CHLORIDE: .6; .31; .03; .02 INJECTION, SOLUTION INTRAVENOUS at 07:50

## 2020-07-20 RX ADMIN — METRONIDAZOLE 500 MG: 500 INJECTION, SOLUTION INTRAVENOUS at 07:50

## 2020-07-20 RX ADMIN — LEVETIRACETAM 500 MG: 500 TABLET ORAL at 07:51

## 2020-07-20 RX ADMIN — ONDANSETRON HYDROCHLORIDE 4 MG: 2 INJECTION, SOLUTION INTRAMUSCULAR; INTRAVENOUS at 08:05

## 2020-07-20 RX ADMIN — Medication 10 ML: at 18:45

## 2020-07-20 RX ADMIN — METRONIDAZOLE 500 MG: 500 INJECTION, SOLUTION INTRAVENOUS at 16:31

## 2020-07-20 RX ADMIN — PRAVASTATIN SODIUM 40 MG: 40 TABLET ORAL at 21:41

## 2020-07-20 RX ADMIN — AMLODIPINE BESYLATE 5 MG: 5 TABLET ORAL at 07:51

## 2020-07-20 RX ADMIN — PANTOPRAZOLE SODIUM 40 MG: 40 INJECTION, POWDER, FOR SOLUTION INTRAVENOUS at 07:51

## 2020-07-20 RX ADMIN — SERTRALINE 50 MG: 50 TABLET, FILM COATED ORAL at 07:51

## 2020-07-20 RX ADMIN — LEVETIRACETAM 500 MG: 500 TABLET ORAL at 21:41

## 2020-07-20 RX ADMIN — ISOSORBIDE MONONITRATE 30 MG: 30 TABLET ORAL at 07:51

## 2020-07-20 RX ADMIN — METRONIDAZOLE 500 MG: 500 INJECTION, SOLUTION INTRAVENOUS at 22:20

## 2020-07-20 RX ADMIN — Medication 10 ML: at 21:42

## 2020-07-20 RX ADMIN — PANTOPRAZOLE SODIUM 40 MG: 40 INJECTION, POWDER, FOR SOLUTION INTRAVENOUS at 21:41

## 2020-07-20 RX ADMIN — CEFTRIAXONE SODIUM 1 G: 1 INJECTION, POWDER, FOR SOLUTION INTRAMUSCULAR; INTRAVENOUS at 18:45

## 2020-07-20 ASSESSMENT — PAIN SCALES - GENERAL: PAINLEVEL_OUTOF10: 0

## 2020-07-20 NOTE — PROGRESS NOTES
Telemetry called with notice of rhythm change. It appears patient had aprox 30 beats paced ventricular rhythm, appears unknown that patient had prior pacemaker. Charge telemetry RN verified that these paced beats appear normal for pacemaker rhythm, however she did prior to this looking back in rhythm have some failure to sense with pacemaker beats occurring in middle of beat. MD Mann notified face to face. Stat EKG ordered.

## 2020-07-20 NOTE — PROGRESS NOTES
droplet plus isolation d/c'd. Pt's covid test neg. Call placed to GI to inform of test results, spoke with Dr. Jeff Eric. Ramon Salinas RN\

## 2020-07-20 NOTE — CARE COORDINATION
INTERDISCIPLINARY PLAN OF CARE CONFERENCE    Date/Time: 7/20/2020 12:50 PM  Completed by: Hilario Lima Case Management      Patient Name:  Denver Baltimore  YOB: 1950  Admitting Diagnosis: Acute kidney injury [facility]) [N17.9]     Admit Date/Time:  7/17/2020  6:13 PM    Chart reviewed. Interdisciplinary team met to discuss patient progress and discharge plans. Disciplines included Case Management, Nursing, and Dietitian. Current Status: 07/17/2020    PT/OT recommendation:    Anticipated Discharge Date: TBD- EGD/Colon prior to DC  Expected D/C Disposition:  Nursing Home  Confirmed plan: Chart review  Discharge Plan Comments: Chart reviewed. LTC at [facility] and will return. Needs EGD/Colon. C-19 negative as of 07/17.  +CM following      The Plan for Transition of Care is related to the following treatment goals: [facility] [address] on admit: No- 97%RA

## 2020-07-20 NOTE — PLAN OF CARE
Problem: Falls - Risk of:  Goal: Will remain free from falls  Description: Will remain free from falls  Outcome: Ongoing  Goal: Absence of physical injury  Description: Absence of physical injury  Outcome: Ongoing     Problem: Infection:  Goal: Will remain free from infection  Description: Will remain free from infection  Outcome: Ongoing     Problem: Safety:  Goal: Free from accidental physical injury  Description: Free from accidental physical injury  Outcome: Ongoing  Goal: Free from intentional harm  Description: Free from intentional harm  Outcome: Ongoing     Problem: Daily Care:  Goal: Daily care needs are met  Description: Daily care needs are met  Outcome: Ongoing     Problem: Pain:  Goal: Patient's pain/discomfort is manageable  Description: Patient's pain/discomfort is manageable  Outcome: Ongoing

## 2020-07-20 NOTE — PROGRESS NOTES
EKG obtained. Results communicated with MD Mann, also notified that patient HR increased as high as 150s then decreased back to 100s-105s quickly. At this time patient was walking to bathroom and back. Vitals were obtained and WDL see flowsheets. MD with no new orders at this time, but verbal order to report if incidence happens again. Will continue to monitor.

## 2020-07-20 NOTE — PROGRESS NOTES
PROGRESS NOTE  S:70 yrs Patient  admitted on 7/17/2020 with Acute kidney injury ([facility] [address] [N17.9] . Today she complains of Diarrhea, Nausea and Vomiting. She denies further bleeding. Exam:   Vitals:    07/20/20 0746   BP: (!) 152/93   Pulse: 90   Resp: 16   Temp: 97.1 °F (36.2 °C)   SpO2: 97%     Due to the current efforts to prevent transmission of COVID-19 and also the need to preserve PPE for other caregivers, a face-to-face encounter with the patient was not performed. That being said, all relevant records and diagnostic tests were reviewed, including laboratory results and imaging. Please reference any relevant documentation elsewhere. Care will be coordinated with the primary service. Medications: Reviewed    Labs:  CBC:   Recent Labs     07/18/20  0521  07/19/20  0617 07/19/20  1417 07/19/20  2230 07/20/20  0536   WBC 5.1  --  6.3  --   --  6.6   HGB 6.5*   < > 9.3* 10.1* 9.6* 10.1*   HCT 20.3*   < > 27.9* 30.5* 28.7* 29.7*   MCV 94.2  --  90.6  --   --  92.3     --  140  --   --  138    < > = values in this interval not displayed. BMP:   Recent Labs     07/18/20  1438 07/19/20  0617 07/20/20  0536    135* 139   K 4.3 3.6 3.6   * 102 105   CO2 13* 20* 21   PHOS 5.3* 4.8 5.4*   BUN 83* 80* 71*   CREATININE 3.7* 3.5* 3.7*     LIVER PROFILE:   Recent Labs     07/17/20  1857   AST 19   ALT 11   PROT 5.5*   BILITOT <0.2   ALKPHOS 147*     PT/INR:   Recent Labs     07/17/20 1857   INR 1.68*     Attending Supervising [de-identified] Attestation Statement  The patient is a 72 y.o. female. I have performed a history and physical examination of the patient. I discussed the case with my physician assistant Ceci Smith PA-C    I reviewed the patient's Past Medical History, Past Surgical History, Medications, and Allergies.      Physical Exam:  Vitals:    07/19/20 1332 07/19/20 2030 07/20/20 0000 07/20/20 0746   BP: (!) 161/100 (!) 150/90 (!) 144/94 (!) 152/93   Pulse: 92 91 104 90   Resp: 16 16 20 16   Temp: 97.1 °F (36.2 °C) 98.2 °F (36.8 °C)  97.1 °F (36.2 °C)   TempSrc: Oral Oral  Oral   SpO2: 98% 98%  97%   Weight:           Physical Examination:  Due to the current efforts to prevent transmission of COVID-19 and also the need to preserve PPE for other caregivers, a face-to-face encounter with the patient was not performed. That being said, all relevant records and diagnostic tests were reviewed, including laboratory results and imaging. Please reference any relevant documentation elsewhere. Care will be coordinated with the primary service. Impression: 72year old female with history of hypothyroidism, DVT, cardiac pacemaker, HLD, seizure do, osteomyelitis, depression, and dementia admitted with anemia and proctitis. Differential includes ulcerative proctitis, stercoral ulcers, less likely PUD and malignancy. Stool tests negative. Recommendation:  1. Continue supportive care  2. Monitor Hgb  3. Observe for signs of bleeding  4. Monitor and document output  5. Continue Abx and PPI BID  6. Continue clear liquid diet as tolerated  7. EGD + Colonoscopy once COVID ruled out  8. Will follow      Elinor Caba PA-C  10:27 AM 7/20/2020                      72year old female with history of hypothyroidism, DVT, cardiac pacemaker, HLD, seizure do, osteomyelitis, depression, and dementia admitted with anemia and proctitis. Continue supportive care. Stool tests negative. Clear diet. Colonoscopy and EGD once COVID is ruled out.     Edouard Cavanaugh MD          [phone]  [phone]

## 2020-07-20 NOTE — PROGRESS NOTES
[facility]       Progress Note           Subjective/   72y.o. year old female who we are seeing in consultation for JEN. HPI:  Renal function slow to improve, largely unchanged since admission, urine output of   95 ml over last 24 hours with diez placement. Intake reduced, emesis this AM.    ROS:  BP's on higher side. +weak. Objective/   GEN:  Chronically ill, BP (!) 152/93   Pulse 90   Temp 97.1 °F (36.2 °C) (Oral)   Resp 16   Wt 98 lb 6.4 oz (44.6 kg)   SpO2 97%   Exam deferred to IM as means to protect PPE due to shortage & due to CORONAVIRUS risk. Data/  Recent Labs     07/18/20  0521  07/19/20  0617 07/19/20  1417 07/19/20  2230 07/20/20  0536   WBC 5.1  --  6.3  --   --  6.6   HGB 6.5*   < > 9.3* 10.1* 9.6* 10.1*   HCT 20.3*   < > 27.9* 30.5* 28.7* 29.7*   MCV 94.2  --  90.6  --   --  92.3     --  140  --   --  138    < > = values in this interval not displayed. Recent Labs     07/18/20  1438 07/19/20  0617 07/20/20  0536    135* 139   K 4.3 3.6 3.6   * 102 105   CO2 13* 20* 21   GLUCOSE 111* 150* 108*   PHOS 5.3* 4.8 5.4*   BUN 83* 80* 71*   CREATININE 3.7* 3.5* 3.7*   LABGLOM 12* 13* 12*   GFRAA 15* 16* 15*       Assessment/     -JEN in the setting of hematochezia, no documented hypotension but patient likely has   hemodynamically mediated JEN with possibly ATN.   Urinalysis is concerning for UTI               Unclear what what her baseline renal function is, she had ATN back in December 2019 when SCr was 1.9, no subsequent labs to compare     -Proteus mirabilis UTI on ceftriaxone     -Severe metabolic acidosis likely related to JEN and unclear if she has diarrhea or not   Improved with IVF's    -Hematochezia    -Acute blood loss anemia    Prn prbc's    -Proctitis    Plans per GI       Plan/     - Continue LR at 100 mL/hour  - Started amlodipine 5 mg daily  - Trend labs, bp's    Case d/w Nursing

## 2020-07-20 NOTE — PROGRESS NOTES
This RN resuming care for patient. RN rounded on patient. IV fluids infusing to left AC, PIV intact, wdl. Pt awake, alert to self, place, situation, believed it was August, reoriented to time. Denies any needs at this time. Declines repositioning. Diez emptied, 45ml santiago urine via diez. Call light within reach. White board updated. Will continue to monitor.

## 2020-07-20 NOTE — PROGRESS NOTES
Am assessment completed. See flowsheet. Pt very nauseated at this time. Small amount of emesis. Medicated with zofran for n/v. See mar. 50cc of urine in diez at this time. Pt denies other needs at this time. Bed alarm on for safety. Call light within reach. Barry Basurto RN

## 2020-07-20 NOTE — PROGRESS NOTES
Handoff report and transfer of care given at bedside to [facility]. Patient in stable condition, denies needs/concerns at this time. Call light within reach.

## 2020-07-20 NOTE — PROGRESS NOTES
Progress Note    Admit Date:  7/17/2020    Subjective:  Ms. Chin Rios not a very good historian. She does have some lower abdominal pain and has had some rectal bleeding. No fever or chills. Baseline creatinine is not known but admission creatinine was 3.8. Since she came from the nursing home she is a COVID-19 rule out. No chest pain or shortness of breath. 7/19- she is incontinent of urine. UO not measured. No fever. Feels about the same. No blood in her stool. 7/20:  COVID-19 NOT DETCTED, can d/c droplet + precautions . pt denies any complaints to me. crtn same    Objective:   BP (!) 152/93   Pulse 90   Temp 97.1 °F (36.2 °C) (Oral)   Resp 16   Wt 98 lb 6.4 oz (44.6 kg)   SpO2 97%        Intake/Output Summary (Last 24 hours) at 7/20/2020 1451  Last data filed at 7/20/2020 1126  Gross per 24 hour   Intake 3236 ml   Output 147 ml   Net 3089 ml       Physical Exam:    General appearance: alert, appears stated age and cooperative  Head: Normocephalic, without obvious abnormality, atraumatic  Eyes: conjunctivae/corneas clear. PERRL, EOM's intact.   Neck: no adenopathy, no carotid bruit, no JVD, supple, symmetrical, trachea midline and thyroid not enlarged, symmetric, no tenderness/mass/nodules  Lungs: clear to auscultation bilaterally  Heart: regular rate and rhythm, S1, S2 normal, no murmur, click, rub or gallop  Abdomen: soft, non-tender; bowel sounds normal; no masses,  no organomegaly  Extremities: extremities normal, atraumatic, no cyanosis or edema  Pulses: 2+ and symmetric  Skin: Skin color, texture, turgor normal. No rashes or lesions  Neurologic: Grossly normal    Scheduled Meds:   amLODIPine  5 mg Oral Daily    sodium chloride flush  10 mL Intravenous 2 times per day    cefTRIAXone (ROCEPHIN) IV  1 g Intravenous Q24H    metroNIDAZOLE  500 mg Intravenous Q8H    levETIRAcetam  500 mg Oral BID    raloxifene  60 mg Oral Daily    isosorbide mononitrate  30 mg Oral Daily    sertraline  50 mg piperacillin-tazobactam  Sensitive  <=16  mcg/mL     trimethoprim-sulfamethoxazole  Sensitive  <=2/38  mcg/mL         COVID 19   Not detected. radiology    CT ABDOMEN PELVIS WO CONTRAST Additional Contrast? None   Final Result   Marked mural thickening of the rectum with perirectal fat stranding,   suggesting proctitis. That should be followed to resolution to ensure that   there is no underlying neoplasm. Small bilateral pleural effusions with minimal adjacent atelectasis. Assessment/Plan: JEN vs CKD  -Baseline labs not known  - Creatinine on admission 3.8-->3.7-->3.5-->3.7    - on  IV fluids  - Nephrology consultation  - non-anion gap metabolic acidosis-->resolved with  IV bicarb. - Minimal urine OP documented  - Place diez, strict I&Os,daily weights     Acute cystitis without hematuria  - Urine cultures growing Proteus  - Already on Rocephin. COVID-19 rule out   - since she came from a nursing home  - COVID-19 TESTING negative     Proctitis  - Start on empiric Rocephin and Flagyl D#3   - GI consultation has been obtained. GI bleed  -Her H&H is low-->guaiac positive. - s/p one unit of PRBC  - H and H stable. - will still require further GI workup    Acute blood loss anemia   - due to above. - She will need an EGD and colonoscopy     Seizure DO  - Continue Keppra     Hypothyroidism   Continue Synthroid    HTN  - BP is uncontrolled   - Continue Norvasc     #SCD for DVT prophylaxis.     COVID-19: not detected, can d/c droplet + precautions, can continue with plan for further evaluation of GIB     Vanessa Rice MD

## 2020-07-21 LAB
ALBUMIN SERPL-MCNC: 2.2 G/DL (ref 3.4–5)
ANION GAP SERPL CALCULATED.3IONS-SCNC: 14 MMOL/L (ref 3–16)
BASOPHILS ABSOLUTE: 0.1 K/UL (ref 0–0.2)
BASOPHILS RELATIVE PERCENT: 0.9 %
BUN BLDV-MCNC: 74 MG/DL (ref 7–20)
CALCIUM SERPL-MCNC: 8.1 MG/DL (ref 8.3–10.6)
CHLORIDE BLD-SCNC: 102 MMOL/L (ref 99–110)
CO2: 20 MMOL/L (ref 21–32)
CREAT SERPL-MCNC: 3.9 MG/DL (ref 0.6–1.2)
EOSINOPHILS ABSOLUTE: 0.1 K/UL (ref 0–0.6)
EOSINOPHILS RELATIVE PERCENT: 1.4 %
GFR AFRICAN AMERICAN: 14
GFR NON-AFRICAN AMERICAN: 11
GLUCOSE BLD-MCNC: 90 MG/DL (ref 70–99)
HCT VFR BLD CALC: 30.2 % (ref 36–48)
HCT VFR BLD CALC: 31.4 % (ref 36–48)
HCT VFR BLD CALC: 33.3 % (ref 36–48)
HEMOGLOBIN: 10.1 G/DL (ref 12–16)
HEMOGLOBIN: 10.5 G/DL (ref 12–16)
HEMOGLOBIN: 10.7 G/DL (ref 12–16)
LYMPHOCYTES ABSOLUTE: 1.4 K/UL (ref 1–5.1)
LYMPHOCYTES RELATIVE PERCENT: 18.3 %
MCH RBC QN AUTO: 31.3 PG (ref 26–34)
MCHC RBC AUTO-ENTMCNC: 33.6 G/DL (ref 31–36)
MCV RBC AUTO: 93.3 FL (ref 80–100)
MONOCYTES ABSOLUTE: 0.7 K/UL (ref 0–1.3)
MONOCYTES RELATIVE PERCENT: 9.4 %
NEUTROPHILS ABSOLUTE: 5.3 K/UL (ref 1.7–7.7)
NEUTROPHILS RELATIVE PERCENT: 70 %
PDW BLD-RTO: 16.3 % (ref 12.4–15.4)
PHOSPHORUS: 6.1 MG/DL (ref 2.5–4.9)
PLATELET # BLD: 155 K/UL (ref 135–450)
PMV BLD AUTO: 7.6 FL (ref 5–10.5)
POTASSIUM SERPL-SCNC: 3.9 MMOL/L (ref 3.5–5.1)
RBC # BLD: 3.36 M/UL (ref 4–5.2)
SODIUM BLD-SCNC: 136 MMOL/L (ref 136–145)
WBC # BLD: 7.5 K/UL (ref 4–11)

## 2020-07-21 PROCEDURE — 6360000002 HC RX W HCPCS: Performed by: INTERNAL MEDICINE

## 2020-07-21 PROCEDURE — C9113 INJ PANTOPRAZOLE SODIUM, VIA: HCPCS | Performed by: INTERNAL MEDICINE

## 2020-07-21 PROCEDURE — 6370000000 HC RX 637 (ALT 250 FOR IP): Performed by: INTERNAL MEDICINE

## 2020-07-21 PROCEDURE — 85014 HEMATOCRIT: CPT

## 2020-07-21 PROCEDURE — 36415 COLL VENOUS BLD VENIPUNCTURE: CPT

## 2020-07-21 PROCEDURE — 85018 HEMOGLOBIN: CPT

## 2020-07-21 PROCEDURE — 2500000003 HC RX 250 WO HCPCS: Performed by: INTERNAL MEDICINE

## 2020-07-21 PROCEDURE — 99232 SBSQ HOSP IP/OBS MODERATE 35: CPT | Performed by: INTERNAL MEDICINE

## 2020-07-21 PROCEDURE — 2580000003 HC RX 258: Performed by: INTERNAL MEDICINE

## 2020-07-21 PROCEDURE — 6370000000 HC RX 637 (ALT 250 FOR IP): Performed by: PHYSICIAN ASSISTANT

## 2020-07-21 PROCEDURE — 1200000000 HC SEMI PRIVATE

## 2020-07-21 PROCEDURE — 85025 COMPLETE CBC W/AUTO DIFF WBC: CPT

## 2020-07-21 PROCEDURE — 80069 RENAL FUNCTION PANEL: CPT

## 2020-07-21 RX ORDER — POLYETHYLENE GLYCOL 3350 17 G/17G
119 POWDER, FOR SOLUTION ORAL ONCE
Status: COMPLETED | OUTPATIENT
Start: 2020-07-21 | End: 2020-07-21

## 2020-07-21 RX ORDER — POLYETHYLENE GLYCOL 3350 17 G/17G
119 POWDER, FOR SOLUTION ORAL ONCE
Status: DISCONTINUED | OUTPATIENT
Start: 2020-07-21 | End: 2020-07-21

## 2020-07-21 RX ORDER — FUROSEMIDE 10 MG/ML
40 INJECTION INTRAMUSCULAR; INTRAVENOUS ONCE
Status: COMPLETED | OUTPATIENT
Start: 2020-07-21 | End: 2020-07-21

## 2020-07-21 RX ORDER — POLYETHYLENE GLYCOL 3350 17 G/17G
119 POWDER, FOR SOLUTION ORAL ONCE
Status: COMPLETED | OUTPATIENT
Start: 2020-07-22 | End: 2020-07-22

## 2020-07-21 RX ADMIN — BISACODYL 20 MG: 5 TABLET, COATED ORAL at 12:06

## 2020-07-21 RX ADMIN — RALOXIFENE HYDROCHLORIDE 60 MG: 60 TABLET, FILM COATED ORAL at 08:58

## 2020-07-21 RX ADMIN — METRONIDAZOLE 500 MG: 500 INJECTION, SOLUTION INTRAVENOUS at 15:32

## 2020-07-21 RX ADMIN — Medication 10 ML: at 22:09

## 2020-07-21 RX ADMIN — Medication 10 ML: at 08:49

## 2020-07-21 RX ADMIN — METRONIDAZOLE 500 MG: 500 INJECTION, SOLUTION INTRAVENOUS at 08:50

## 2020-07-21 RX ADMIN — ISOSORBIDE MONONITRATE 30 MG: 30 TABLET ORAL at 08:50

## 2020-07-21 RX ADMIN — PRAVASTATIN SODIUM 40 MG: 40 TABLET ORAL at 22:10

## 2020-07-21 RX ADMIN — PANTOPRAZOLE SODIUM 40 MG: 40 INJECTION, POWDER, FOR SOLUTION INTRAVENOUS at 08:48

## 2020-07-21 RX ADMIN — CEFTRIAXONE SODIUM 1 G: 1 INJECTION, POWDER, FOR SOLUTION INTRAMUSCULAR; INTRAVENOUS at 17:58

## 2020-07-21 RX ADMIN — FUROSEMIDE 40 MG: 10 INJECTION, SOLUTION INTRAMUSCULAR; INTRAVENOUS at 09:56

## 2020-07-21 RX ADMIN — LEVETIRACETAM 500 MG: 500 TABLET ORAL at 08:48

## 2020-07-21 RX ADMIN — SODIUM CHLORIDE, SODIUM LACTATE, POTASSIUM CHLORIDE, AND CALCIUM CHLORIDE: .6; .31; .03; .02 INJECTION, SOLUTION INTRAVENOUS at 03:44

## 2020-07-21 RX ADMIN — PANTOPRAZOLE SODIUM 40 MG: 40 INJECTION, POWDER, FOR SOLUTION INTRAVENOUS at 22:10

## 2020-07-21 RX ADMIN — AMLODIPINE BESYLATE 5 MG: 5 TABLET ORAL at 08:50

## 2020-07-21 RX ADMIN — SERTRALINE 50 MG: 50 TABLET, FILM COATED ORAL at 08:48

## 2020-07-21 RX ADMIN — POLYETHYLENE GLYCOL 3350 119 G: 17 POWDER, FOR SOLUTION ORAL at 14:31

## 2020-07-21 RX ADMIN — LEVETIRACETAM 500 MG: 500 TABLET ORAL at 22:10

## 2020-07-21 NOTE — PLAN OF CARE
Problem: Falls - Risk of:  Goal: Will remain free from falls  Description: Will remain free from falls  7/21/2020 1129 by Scott Sanders RN  Outcome: Ongoing  7/20/2020 2247 by Kisha Simmons RN  Outcome: Ongoing  Goal: Absence of physical injury  Description: Absence of physical injury  7/21/2020 1129 by Scott Sanders RN  Outcome: Ongoing  7/20/2020 2247 by Kisha Simmons RN  Outcome: Ongoing     Problem: Infection:  Goal: Will remain free from infection  Description: Will remain free from infection  7/21/2020 1129 by Scott Sanders RN  Outcome: Ongoing  7/20/2020 2247 by Kisha Simmons RN  Outcome: Ongoing     Problem: Safety:  Goal: Free from accidental physical injury  Description: Free from accidental physical injury  7/21/2020 1129 by Scott Sanders RN  Outcome: Ongoing  7/20/2020 2247 by Kisha Simmons RN  Outcome: Ongoing     Problem: Safety:  Goal: Free from intentional harm  Description: Free from intentional harm  7/21/2020 1129 by Scott Sanders RN  Outcome: Ongoing  7/20/2020 2247 by Kisha Simmons RN  Outcome: Ongoing     Problem: Daily Care:  Goal: Daily care needs are met  Description: Daily care needs are met  7/21/2020 1129 by Scott Sanders RN  Outcome: Ongoing  7/20/2020 2247 by Kisha Simmons RN  Outcome: Ongoing     Problem: Pain:  Goal: Patient's pain/discomfort is manageable  Description: Patient's pain/discomfort is manageable  7/21/2020 1129 by Scott Sanders RN  Outcome: Ongoing  7/20/2020 2247 by Kisha Simmons RN  Outcome: Ongoing     Problem: Discharge Planning:  Goal: Patients continuum of care needs are met  Description: Patients continuum of care needs are met  7/21/2020 1129 by Scott Sanders RN  Outcome: Ongoing  7/20/2020 2247 by Kisha Simmons RN  Outcome: Ongoing

## 2020-07-21 NOTE — PROGRESS NOTES
Progress Note    Admit Date:  7/17/2020    Subjective:  Ms. Eugene Sensing not a very good historian. She does have some lower abdominal pain and has had some rectal bleeding. No fever or chills. Baseline creatinine is not known but admission creatinine was 3.8. Since she came from the nursing home she is a COVID-19 rule out. No chest pain or shortness of breath. 7/19- she is incontinent of urine. UO not measured. No fever. Feels about the same. No blood in her stool. 7/20:  COVID-19 NOT DETCTED, can d/c droplet + precautions . pt denies any complaints to me. crtn same    7/21: Patient complains of some abdominal pain , no distress . having increasing edema. Telemetry->  arrhythmia, abnormal pacemaker spikes. Pacemaker needs to be interrogated    Objective:   /83   Pulse 105   Temp 97 °F (36.1 °C) (Oral)   Resp 16   Wt 98 lb 6.4 oz (44.6 kg)   SpO2 91%       Intake/Output Summary (Last 24 hours) at 7/21/2020 1443  Last data filed at 7/21/2020 4634  Gross per 24 hour   Intake 1206.92 ml   Output 125 ml   Net 1081.92 ml       Physical Exam:  General appearance: alert, appears stated age and cooperative  Head: Normocephalic, without obvious abnormality, atraumatic  Eyes: conjunctivae/corneas clear. PERRL, EOM's intact. Neck: no adenopathy, no carotid bruit, no JVD, supple, symmetrical, trachea midline and thyroid not enlarged, symmetric, no tenderness/mass/nodules  Lungs: clear to auscultation bilaterally  Heart: regular rate and rhythm, S1, S2 normal, no murmur, click, rub or gallop  Abdomen: soft, non-tender; bowel sounds normal; no masses,  no organomegaly  Extremities: Bilateral upper extremity 1+ edema present.   Skin: Skin color, texture, turgor normal. No rashes or lesions  Neurologic: Grossly normal    Scheduled Meds:   [START ON 7/22/2020] polyethylene glycol  119 g Oral Once    amLODIPine  5 mg Oral Daily    sodium chloride flush  10 mL Intravenous 2 times per day    cefTRIAXone (ROCEPHIN) <=16  mcg/mL     trimethoprim-sulfamethoxazole  Sensitive  <=2/38  mcg/mL         COVID 19   Not detected. RADIOLOGY:    CT ABDOMEN PELVIS WO CONTRAST Additional Contrast? None   Final Result   Marked mural thickening of the rectum with perirectal fat stranding,   suggesting proctitis. That should be followed to resolution to ensure that   there is no underlying neoplasm. Small bilateral pleural effusions with minimal adjacent atelectasis. Assessment/Plan: JEN vs CKD  -Baseline labs not known  - Creatinine on admission 3.8-->3.7-->3.5-->3.7-->3.9    - on  IV fluids  - Nephrology consultation  - non-anion gap metabolic acidosis-->resolved with  IV bicarb. - Minimal urine OP documented, increasing edema . IV fluids discontinued today, 1 dose of Lasix given. -Nephrology following  -Munroe in place  -Continue strict I&Os,daily weights     Acute cystitis without hematuria  - Urine cultures growing Proteus  - Already on Rocephin. COVID-19 ruled out   - since she came from a nursing home  - COVID-19 TESTING negative     Proctitis  - Start on empiric Rocephin and Flagyl D#4   - GI consultation has been obtained. GI bleed  -Her H&H is low-->guaiac positive. - s/p one unit of PRBC  - H and H stable. - plans for EGD and colonoscopy tomorrow 7/22    Acute blood loss anemia   - due to above.    - H/H remaining stable   - She will need an EGD and colonoscopy     Seizure DO  - Continue Keppra     Hypothyroidism   Continue Synthroid    HTN  - BP is uncontrolled   - Continue Norvasc       Pacemaker interrogation    #SCD for DVT prophylaxis. Plans for EGD and colonoscopy tomorrow. IVF stopped 2/2 signs of fluid overload. Creatinine remaining in same range.            Angeli Edward MD    7/21/2020

## 2020-07-21 NOTE — PROGRESS NOTES
Patient in bed with eyes open alert. Denies pain at this time. LR running at 100 in LAC PIV. Abx continued as ordered with no adverse reaction. Munroe drain with santiago urine at a low output at this time. Swelling to left lower arm still present bot improving while elevated up on pillows. Denied pain to the arm and skin is warm and dry. All safety maintained and call light within reach.

## 2020-07-21 NOTE — PROGRESS NOTES
Hospitalist notified of increased edema, increased to generlized with swelling of hands and ankles with crackles in bilateral bases. Verbal order to stop IV fluids for now, and page nephrology. Nephrology paged. Will continue to monitor.

## 2020-07-21 NOTE — PROGRESS NOTES
PROGRESS NOTE  S:70 yrs Patient  admitted on 7/17/2020 with Acute kidney injury ([facility] [address] [N17.9] . Today she complains of Nausea, LLQ Abdominal Pain and passed BM last night. Exam:   Vitals:    07/21/20 0845   BP: (!) 155/103   Pulse: 110   Resp: 16   Temp: 97.7 °F (36.5 °C)   SpO2:       General appearance: alert, appears older than stated age, cooperative, distracted, fatigued, no distress and slowed mentation  HEENT: Oropharynx clear, no lesions  Neck: no adenopathy and supple, symmetrical, trachea midline  Lungs: clear to auscultation bilaterally  Heart: regular rate and rhythm, S1, S2 normal, no murmur, click, rub or gallop  Abdomen: normal findings: bowel sounds normal, no masses palpable and symmetric and abnormal findings:  tenderness mild in the LLQ  Extremities: extremities normal, atraumatic, no cyanosis or edema     Medications: Reviewed    Labs:  CBC:   Recent Labs     07/19/20  0617  07/20/20  0536  07/20/20  1722 07/20/20  2305 07/21/20  0520   WBC 6.3  --  6.6  --   --   --  7.5   HGB 9.3*   < > 10.1*   < > 9.9* 8.9* 10.5*   HCT 27.9*   < > 29.7*   < > 29.0* 26.5* 31.4*   MCV 90.6  --  92.3  --   --   --  93.3     --  138  --   --   --  155    < > = values in this interval not displayed. BMP:   Recent Labs     07/19/20  0617 07/20/20  0536 07/21/20  0520   * 139 136   K 3.6 3.6 3.9    105 102   CO2 20* 21 20*   PHOS 4.8 5.4* 6.1*   BUN 80* 71* 74*   CREATININE 3.5* 3.7* 3.9*     Attending Supervising [de-identified] Attestation Statement  The patient is a 72 y.o. female. I have performed a history and physical examination of the patient. I discussed the case with my physician assistant Praveena Padilla PA-C    I reviewed the patient's Past Medical History, Past Surgical History, Medications, and Allergies.      Physical Exam:  Vitals:    07/20/20 2147 07/21/20 0345 07/21/20 0845 07/21/20 1400   BP: 131/88 128/84 (!) 155/103 132/83   Pulse: 95 92 110 105   Resp: 18 16 16 16   Temp: 98.3 °F (36.8 °C) 98.6 °F (37 °C) 97.7 °F (36.5 °C) 97 °F (36.1 °C)   TempSrc: Oral Oral Oral Oral   SpO2: 98% 99%  91%   Weight:           Physical Examination: General appearance - ill-appearing  Mental status - confused  Eyes - pupils equal and reactive, extraocular eye movements intact  Neck - supple, no significant adenopathy  Chest - clear to auscultation, no wheezes, rales or rhonchi, symmetric air entry  Heart - normal rate, regular rhythm, normal S1, S2, no murmurs, rubs, clicks or gallops  Abdomen - soft, nontender, nondistended, no masses or organomegaly  Extremities - no pedal edema noted          Impression: 72year old female with history of hypothyroidism, DVT, cardiac pacemaker, HLD, seizure do, osteomyelitis, depression, and dementia admitted with anemia and proctitis. Stool tests negative. Recommendation:  1. Continue supportive care  2. Monitor Hgb  3. Observe for signs of bleeding  4. Monitor and document output  5. Continue Abx and PPI BID  6. Continue clear liquid diet as tolerated  7. EGD + Colonoscopy tomorrow  8. Begin bowel prep  9. NPO after 5:00 AM  10. Will follow      Nidia Maguire PA-C  10:13 AM 7/21/2020                      72year old female with history of hypothyroidism, DVT, cardiac pacemaker, HLD, seizure do, osteomyelitis, depression, and dementia admitted with anemia and proctitis. Proceed with EGD+colon tomorrow.     Lisset Decker MD          [phone]  [phone]

## 2020-07-21 NOTE — FLOWSHEET NOTE
07/21/20 0345   Vital Signs   Temp 98.6 °F (37 °C)   Temp Source Oral   Pulse 92   Heart Rate Source Monitor   Resp 16   /84   BP Location Right upper arm   BP Upper/Lower Upper   Patient Position Semi fowlers   Level of Consciousness 0   MEWS Score 1   Oxygen Therapy   SpO2 99 %   O2 Device None (Room air)   Pts IV leaking removed and dressing applied. Spoke to clinical okay to use other IV that was labeled lab draw only.

## 2020-07-21 NOTE — PROGRESS NOTES
Bedside report given to [facility]  pt in stable condition no needs at this time.  Call light within reach

## 2020-07-21 NOTE — PROGRESS NOTES
Transfer of care from [address] [place] [address] [address] pt awake in stable condition denies any needs

## 2020-07-21 NOTE — PROGRESS NOTES
today, prn dose of IV lasix  - Started amlodipine 5 mg daily for BP control  - Trend labs, bp's    Case d/w Nursing

## 2020-07-22 ENCOUNTER — ANESTHESIA (OUTPATIENT)
Dept: ENDOSCOPY | Age: 70
DRG: 377 | End: 2020-07-22
Payer: COMMERCIAL

## 2020-07-22 ENCOUNTER — ANESTHESIA EVENT (OUTPATIENT)
Dept: ENDOSCOPY | Age: 70
DRG: 377 | End: 2020-07-22
Payer: COMMERCIAL

## 2020-07-22 VITALS
DIASTOLIC BLOOD PRESSURE: 78 MMHG | RESPIRATION RATE: 15 BRPM | OXYGEN SATURATION: 99 % | SYSTOLIC BLOOD PRESSURE: 118 MMHG

## 2020-07-22 LAB
ALBUMIN SERPL-MCNC: 2.2 G/DL (ref 3.4–5)
ANION GAP SERPL CALCULATED.3IONS-SCNC: 22 MMOL/L (ref 3–16)
BASOPHILS ABSOLUTE: 0.1 K/UL (ref 0–0.2)
BASOPHILS RELATIVE PERCENT: 0.9 %
BUN BLDV-MCNC: 70 MG/DL (ref 7–20)
CALCIUM SERPL-MCNC: 8.3 MG/DL (ref 8.3–10.6)
CHLORIDE BLD-SCNC: 100 MMOL/L (ref 99–110)
CO2: 13 MMOL/L (ref 21–32)
CREAT SERPL-MCNC: 4.1 MG/DL (ref 0.6–1.2)
EOSINOPHILS ABSOLUTE: 0.1 K/UL (ref 0–0.6)
EOSINOPHILS RELATIVE PERCENT: 1.3 %
GFR AFRICAN AMERICAN: 13
GFR NON-AFRICAN AMERICAN: 11
GLUCOSE BLD-MCNC: 64 MG/DL (ref 70–99)
HCT VFR BLD CALC: 29.1 % (ref 36–48)
HCT VFR BLD CALC: 30 % (ref 36–48)
HCT VFR BLD CALC: 30 % (ref 36–48)
HEMOGLOBIN: 9.7 G/DL (ref 12–16)
HEMOGLOBIN: 9.9 G/DL (ref 12–16)
HEMOGLOBIN: 9.9 G/DL (ref 12–16)
LYMPHOCYTES ABSOLUTE: 1.1 K/UL (ref 1–5.1)
LYMPHOCYTES RELATIVE PERCENT: 11.3 %
MCH RBC QN AUTO: 31.3 PG (ref 26–34)
MCHC RBC AUTO-ENTMCNC: 33.1 G/DL (ref 31–36)
MCV RBC AUTO: 94.4 FL (ref 80–100)
MONOCYTES ABSOLUTE: 0.7 K/UL (ref 0–1.3)
MONOCYTES RELATIVE PERCENT: 7.1 %
NEUTROPHILS ABSOLUTE: 7.6 K/UL (ref 1.7–7.7)
NEUTROPHILS RELATIVE PERCENT: 79.4 %
PDW BLD-RTO: 16.2 % (ref 12.4–15.4)
PHOSPHORUS: 6.1 MG/DL (ref 2.5–4.9)
PLATELET # BLD: 171 K/UL (ref 135–450)
PMV BLD AUTO: 7.9 FL (ref 5–10.5)
POTASSIUM SERPL-SCNC: 3.7 MMOL/L (ref 3.5–5.1)
RBC # BLD: 3.18 M/UL (ref 4–5.2)
SODIUM BLD-SCNC: 135 MMOL/L (ref 136–145)
WBC # BLD: 9.5 K/UL (ref 4–11)

## 2020-07-22 PROCEDURE — 3700000001 HC ADD 15 MINUTES (ANESTHESIA): Performed by: INTERNAL MEDICINE

## 2020-07-22 PROCEDURE — 2580000003 HC RX 258: Performed by: INTERNAL MEDICINE

## 2020-07-22 PROCEDURE — 85014 HEMATOCRIT: CPT

## 2020-07-22 PROCEDURE — 7100000010 HC PHASE II RECOVERY - FIRST 15 MIN: Performed by: INTERNAL MEDICINE

## 2020-07-22 PROCEDURE — 6370000000 HC RX 637 (ALT 250 FOR IP): Performed by: PHYSICIAN ASSISTANT

## 2020-07-22 PROCEDURE — 85025 COMPLETE CBC W/AUTO DIFF WBC: CPT

## 2020-07-22 PROCEDURE — 99232 SBSQ HOSP IP/OBS MODERATE 35: CPT | Performed by: INTERNAL MEDICINE

## 2020-07-22 PROCEDURE — 3700000000 HC ANESTHESIA ATTENDED CARE: Performed by: INTERNAL MEDICINE

## 2020-07-22 PROCEDURE — 3609012400 HC EGD TRANSORAL BIOPSY SINGLE/MULTIPLE: Performed by: INTERNAL MEDICINE

## 2020-07-22 PROCEDURE — 85018 HEMOGLOBIN: CPT

## 2020-07-22 PROCEDURE — 2500000003 HC RX 250 WO HCPCS: Performed by: INTERNAL MEDICINE

## 2020-07-22 PROCEDURE — 2580000003 HC RX 258: Performed by: NURSE ANESTHETIST, CERTIFIED REGISTERED

## 2020-07-22 PROCEDURE — 0DBE8ZX EXCISION OF LARGE INTESTINE, VIA NATURAL OR ARTIFICIAL OPENING ENDOSCOPIC, DIAGNOSTIC: ICD-10-PCS | Performed by: INTERNAL MEDICINE

## 2020-07-22 PROCEDURE — 0DBL8ZX EXCISION OF TRANSVERSE COLON, VIA NATURAL OR ARTIFICIAL OPENING ENDOSCOPIC, DIAGNOSTIC: ICD-10-PCS | Performed by: INTERNAL MEDICINE

## 2020-07-22 PROCEDURE — 6370000000 HC RX 637 (ALT 250 FOR IP): Performed by: INTERNAL MEDICINE

## 2020-07-22 PROCEDURE — C9113 INJ PANTOPRAZOLE SODIUM, VIA: HCPCS | Performed by: INTERNAL MEDICINE

## 2020-07-22 PROCEDURE — 3609010300 HC COLONOSCOPY W/BIOPSY SINGLE/MULTIPLE: Performed by: INTERNAL MEDICINE

## 2020-07-22 PROCEDURE — 7100000011 HC PHASE II RECOVERY - ADDTL 15 MIN: Performed by: INTERNAL MEDICINE

## 2020-07-22 PROCEDURE — 88305 TISSUE EXAM BY PATHOLOGIST: CPT

## 2020-07-22 PROCEDURE — 2709999900 HC NON-CHARGEABLE SUPPLY: Performed by: INTERNAL MEDICINE

## 2020-07-22 PROCEDURE — 6360000002 HC RX W HCPCS: Performed by: NURSE ANESTHETIST, CERTIFIED REGISTERED

## 2020-07-22 PROCEDURE — 0DB78ZX EXCISION OF STOMACH, PYLORUS, VIA NATURAL OR ARTIFICIAL OPENING ENDOSCOPIC, DIAGNOSTIC: ICD-10-PCS | Performed by: INTERNAL MEDICINE

## 2020-07-22 PROCEDURE — 36415 COLL VENOUS BLD VENIPUNCTURE: CPT

## 2020-07-22 PROCEDURE — 0DB98ZX EXCISION OF DUODENUM, VIA NATURAL OR ARTIFICIAL OPENING ENDOSCOPIC, DIAGNOSTIC: ICD-10-PCS | Performed by: INTERNAL MEDICINE

## 2020-07-22 PROCEDURE — 6360000002 HC RX W HCPCS: Performed by: INTERNAL MEDICINE

## 2020-07-22 PROCEDURE — 80069 RENAL FUNCTION PANEL: CPT

## 2020-07-22 PROCEDURE — 2500000003 HC RX 250 WO HCPCS: Performed by: NURSE ANESTHETIST, CERTIFIED REGISTERED

## 2020-07-22 PROCEDURE — 1200000000 HC SEMI PRIVATE

## 2020-07-22 RX ORDER — PROPOFOL 10 MG/ML
INJECTION, EMULSION INTRAVENOUS PRN
Status: DISCONTINUED | OUTPATIENT
Start: 2020-07-22 | End: 2020-07-22 | Stop reason: SDUPTHER

## 2020-07-22 RX ORDER — LIDOCAINE HYDROCHLORIDE 20 MG/ML
INJECTION, SOLUTION INFILTRATION; PERINEURAL PRN
Status: DISCONTINUED | OUTPATIENT
Start: 2020-07-22 | End: 2020-07-22 | Stop reason: SDUPTHER

## 2020-07-22 RX ORDER — SODIUM CHLORIDE, SODIUM LACTATE, POTASSIUM CHLORIDE, CALCIUM CHLORIDE 600; 310; 30; 20 MG/100ML; MG/100ML; MG/100ML; MG/100ML
INJECTION, SOLUTION INTRAVENOUS CONTINUOUS PRN
Status: DISCONTINUED | OUTPATIENT
Start: 2020-07-22 | End: 2020-07-22 | Stop reason: SDUPTHER

## 2020-07-22 RX ORDER — SODIUM BICARBONATE 650 MG/1
650 TABLET ORAL 4 TIMES DAILY
Status: DISCONTINUED | OUTPATIENT
Start: 2020-07-22 | End: 2020-07-25

## 2020-07-22 RX ADMIN — SERTRALINE 50 MG: 50 TABLET, FILM COATED ORAL at 10:00

## 2020-07-22 RX ADMIN — PROPOFOL 200 MG: 10 INJECTION, EMULSION INTRAVENOUS at 08:56

## 2020-07-22 RX ADMIN — METRONIDAZOLE 500 MG: 500 INJECTION, SOLUTION INTRAVENOUS at 15:43

## 2020-07-22 RX ADMIN — CEFTRIAXONE SODIUM 1 G: 1 INJECTION, POWDER, FOR SOLUTION INTRAMUSCULAR; INTRAVENOUS at 16:47

## 2020-07-22 RX ADMIN — RALOXIFENE HYDROCHLORIDE 60 MG: 60 TABLET, FILM COATED ORAL at 10:09

## 2020-07-22 RX ADMIN — METRONIDAZOLE 500 MG: 500 INJECTION, SOLUTION INTRAVENOUS at 00:05

## 2020-07-22 RX ADMIN — POLYETHYLENE GLYCOL 3350 119 G: 17 POWDER, FOR SOLUTION ORAL at 02:37

## 2020-07-22 RX ADMIN — PANTOPRAZOLE SODIUM 40 MG: 40 INJECTION, POWDER, FOR SOLUTION INTRAVENOUS at 09:58

## 2020-07-22 RX ADMIN — PANTOPRAZOLE SODIUM 40 MG: 40 INJECTION, POWDER, FOR SOLUTION INTRAVENOUS at 20:58

## 2020-07-22 RX ADMIN — LIDOCAINE HYDROCHLORIDE 50 MG: 20 INJECTION, SOLUTION INFILTRATION; PERINEURAL at 08:56

## 2020-07-22 RX ADMIN — METRONIDAZOLE 500 MG: 500 INJECTION, SOLUTION INTRAVENOUS at 09:59

## 2020-07-22 RX ADMIN — SODIUM BICARBONATE 650 MG: 650 TABLET ORAL at 20:58

## 2020-07-22 RX ADMIN — PRAVASTATIN SODIUM 40 MG: 40 TABLET ORAL at 20:58

## 2020-07-22 RX ADMIN — LEVETIRACETAM 500 MG: 500 TABLET ORAL at 20:58

## 2020-07-22 RX ADMIN — AMLODIPINE BESYLATE 5 MG: 5 TABLET ORAL at 09:59

## 2020-07-22 RX ADMIN — LEVETIRACETAM 500 MG: 500 TABLET ORAL at 09:59

## 2020-07-22 RX ADMIN — ISOSORBIDE MONONITRATE 30 MG: 30 TABLET ORAL at 09:58

## 2020-07-22 RX ADMIN — SODIUM CHLORIDE, POTASSIUM CHLORIDE, SODIUM LACTATE AND CALCIUM CHLORIDE: 600; 310; 30; 20 INJECTION, SOLUTION INTRAVENOUS at 08:52

## 2020-07-22 RX ADMIN — SODIUM BICARBONATE 650 MG: 650 TABLET ORAL at 14:27

## 2020-07-22 RX ADMIN — Medication 10 ML: at 09:58

## 2020-07-22 RX ADMIN — SODIUM BICARBONATE 650 MG: 650 TABLET ORAL at 16:47

## 2020-07-22 RX ADMIN — Medication 10 ML: at 20:58

## 2020-07-22 ASSESSMENT — PAIN SCALES - GENERAL
PAINLEVEL_OUTOF10: 0

## 2020-07-22 NOTE — H&P
History and Physical / Pre-Sedation Assessment    Patient:  Isaias Becerra   :   1950     Intended Procedure:  EGD+Colon    HPI: 72year old female with history of hypothyroidism, DVT, cardiac pacemaker, HLD, seizure do, osteomyelitis, depression, and dementia admitted with anemia and proctitis    Past Medical History:   Diagnosis Date    Anemia     Cognitive communication deficit     Convulsions ([facility] [address]     Depressive disorder     Muscle wasting     Osteomyelitis of lumbar spine ([facility] [address]     Pacemaker     medtronic    Sepsis ([facility] [address]      History reviewed. No pertinent surgical history. Medications reviewed  Prior to Admission medications    Medication Sig Start Date End Date Taking? Authorizing Provider   bismuth subsalicylate (PEPTO BISMOL) 525 MG/15ML suspension Take 30 mLs by mouth every 8 hours as needed for Indigestion   Yes Historical Provider, MD   HYDROcodone-acetaminophen (NORCO) 5-325 MG per tablet Take 1 tablet by mouth every 6 hours as needed for Pain.    Yes Historical Provider, MD   lactulose (CHRONULAC) 10 GM/15ML solution Take 20 g by mouth daily as needed   Yes Historical Provider, MD   Nutritional Supplements (RESOURCE 2.0 PO) Take 120 mLs by mouth 3 times daily   Yes Historical Provider, MD   levothyroxine (SYNTHROID) 75 MCG tablet Take 75 mcg by mouth Daily   Yes Historical Provider, MD   ARIPiprazole (ABILIFY) 10 MG tablet Take 10 mg by mouth daily   Yes Historical Provider, MD   aspirin 81 MG EC tablet Take 81 mg by mouth daily   Yes Historical Provider, MD   docusate sodium (COLACE) 100 MG capsule Take 100 mg by mouth 2 times daily   Yes Historical Provider, MD   doxycycline hyclate (VIBRAMYCIN) 100 MG capsule Take 100 mg by mouth 2 times daily   Yes Historical Provider, MD   apixaban (ELIQUIS) 5 MG TABS tablet Take 5 mg by mouth 2 times daily   Yes Historical Provider, MD   ferrous sulfate (FE TABS 325) 325 (65 Fe) MG EC tablet Take 325 mg by mouth 2 times daily   Yes Historical Provider, MD   isosorbide mononitrate (IMDUR) 30 MG extended release tablet Take 30 mg by mouth daily   Yes Historical Provider, MD   levETIRAcetam (KEPPRA) 500 MG tablet Take 500 mg by mouth 2 times daily   Yes Historical Provider, MD   raloxifene (EVISTA) 60 MG tablet Take 60 mg by mouth daily   Yes Historical Provider, MD   senna (SENOKOT) 8.6 MG tablet Take 1 tablet by mouth 2 times daily   Yes Historical Provider, MD   vitamin C (ASCORBIC ACID) 500 MG tablet Take 500 mg by mouth daily   Yes Historical Provider, MD   sertraline (ZOLOFT) 50 MG tablet Take 50 mg by mouth daily   Yes Historical Provider, MD   pravastatin (PRAVACHOL) 40 MG tablet Take 40 mg by mouth daily   Yes Historical Provider, MD   acetaminophen (TYLENOL) 500 MG tablet Take 500 mg by mouth every 4 hours as needed for Pain   Yes Historical Provider, MD   ondansetron (ZOFRAN) 4 MG tablet Take 4 mg by mouth every 6 hours as needed for Nausea or Vomiting   Yes Historical Provider, MD        Allergies: Allergies   Allergen Reactions    Penicillins Rash       Nurses notes reviewed and agreed. Physical Exam:  Vital Signs: BP (!) 146/91   Pulse 111   Temp 98.7 °F (37.1 °C) (Temporal)   Resp 18   Wt 98 lb 3.2 oz (44.5 kg)   SpO2 97%    Airway: Mallampati: II (soft palate, uvula, fauces visible)  Pulmonary:Normal  Cardiac:Normal  Abdomen:Normal    Pre-Procedure Assessment / Plan:  ASA: Class 3 - A patient with severe systemic disease that limits activity but is not incapacitating  Level of Sedation Plan: Moderate sedation  Post Procedure plan: Return to same level of care    I assessed the patient and find that the patient is in satisfactory condition to proceed with the planned procedure and sedation plan. I have explained the risk, benefits, and alternatives to the procedure; the patient understands and agrees to proceed.        Natali Torres  7/22/2020

## 2020-07-22 NOTE — FLOWSHEET NOTE
07/21/20 1930   Vital Signs   Temp 97.2 °F (36.2 °C)   Temp Source Oral   Pulse 117   Heart Rate Source Monitor   Resp 16   /81   BP Location Left lower arm   BP Upper/Lower Lower   Patient Position Semi fowlers   Level of Consciousness 0   MEWS Score 3   Oxygen Therapy   SpO2 97 %   O2 Device None (Room air)   Pt A/O x 4 flat affect assessment completed. PM meds given. Pt denies any needs at this time. 1st dose of bowel prep completed.  Call light within reach and bed alarm on

## 2020-07-22 NOTE — PROGRESS NOTES
Bedside report given and pt care transferred to [facility]. Pt denies needs at this time. Call light within reach.

## 2020-07-22 NOTE — OP NOTE
[address]                 [address] [address]                                OPERATIVE REPORT    PATIENT NAME: Eddi Ceron                       :        1950  MED REC NO:   [id number]                          ROOM:       0230  ACCOUNT NO:   [de-identified]                           ADMIT DATE: 2020  PROVIDER:     Chaim Peterson MD    DATE OF PROCEDURE:  2020    PREPROCEDURE DIAGNOSES:  1. Anemia. 2.  Proctitis per CT scan. POSTPROCEDURE DIAGNOSES:  1.  Mild gastritis. 2.  Otherwise normal EGD. 3.  Diffuse colitis, likely infectious versus ischemic, inflammatory. 4.  Internal hemorrhoids. 5.  No active bleeding. PROCEDURE:  1. EGD with biopsy. 2.  Colonoscopy to the cecum with biopsy. SURGEON:  Chaim Peterson MD    PROCEDURE INDICATIONS:  This is a 64-year-old female with history of  hypertension, hypothyroidism, DVT, hyperlipidemia, seizure disorder,  osteomyelitis, depression, and dementia, who was admitted with anemia  and proctitis. She denies any signs of bleeding, but admits to  dark-colored stools recently. A CT scan in the emergency room did show  mild perirectal fat stranding concerning for proctitis. Her hemoglobin  on admission was as low as 6.5. She did respond appropriately with 1  unit of packed RBCs. Her hemoglobin has remained stable since initial  transfusion. EGD and colonoscopy are being performed for diagnostic  purposes. MEDICATIONS:  MAC per Anesthesia. PROCEDURE DETAILS:  Informed consent was obtained after discussing  risks, benefits, and alternatives. Full history and physical was  performed. The patient was classified as ASA class III. Medications  were given by Anesthesia. Cardiopulmonary status was continuously  monitored throughout the procedure. The patient was placed in left  lateral decubitus position.   Once the patient was adequately sedated, a  standard upper gastroscope was inserted in the mouth and advanced under  direct visualization to second portion of the duodenum. Entire mucosa  of esophagus, stomach (retroflexed and forward views), duodenum (bulb,  sweep, and second portion) were examined carefully during withdrawal.   The patient tolerated the procedure well without any difficulties. While the patient was lying in the left lateral decubitus position, the  bed was repositioned. A standard pediatric colonoscope was inserted in  the anus and advanced to the cecum identified by landmarks of  appendiceal orifice and IC valve. Entire mucosa of cecum, ascending  colon, transverse colon, descending colon, sigmoid colon, rectum were  examined carefully during withdrawal.  The patient tolerated the  procedure well without any difficulties. FINDINGS:  ESOPHAGUS:  The entire esophagus appeared normal.  No evidence of  inflammation, ulcers, strictures or Aponte's. STOMACH:  There was evidence of diffuse gastritis in the antrum  characterized by erythema and granularity. Biopsies were taken to rule  out H. pylori. Retroflexed view of the stomach was normal.    DUODENUM:  Examined portion of the duodenum appeared normal.  Biopsies  were taken for second portion to rule out celiac sprue. RECTUM:  The digital rectal exam was normal.  No masses were felt. COLON:  There was evidence of diffuse colitis, more so in the transverse  colon. Biopsies were taken. This is likely infectious versus ischemic,  but cannot exclude inflammatory bowel disease. There was also evidence  of melanosis coli diffusely. Random biopsies were taken throughout the  colon. There was evidence of mild sigmoid diverticulosis. Retroflexed  view of the rectum showed internal hemorrhoids. No evidence of  bleeding. SUMMARY:  1.  Mild gastritis. 2.  Melanosis coli. 3.  Mild colitis. 4.  Internal hemorrhoids. 5.  Sigmoid diverticulosis.   6.  No active bleeding. RECOMMENDATIONS:  1. Return the patient to floor for continuous medical care. 2.  Continue PPI daily. 3.  Resume diet and advance as tolerated. 4.  Await pathology results. 5.  IV iron therapy as needed today. 6.  Okay to discharge from GI standpoint with close outpatient followup  pending biopsy results.     EBL: < 5mL    Vanessa Montoya MD    D: 07/22/2020 9:18:22       T: 07/22/2020 9:25:12     [id number]  Job#: [id number]     Doc#: [id number]    CC:  Dr. Flavio Reyes MD

## 2020-07-22 NOTE — PROGRESS NOTES
Bedside report given to [facility]  pt in stable condition no needs at this time.  Call light within reach

## 2020-07-22 NOTE — PROGRESS NOTES
Pts 2nd dose of bowel prep started at this time. Pt aware that she has until 0500 to drink it all.  Pt denies any needs at this time

## 2020-07-22 NOTE — PROGRESS NOTES
Progress Note    Admit Date:  7/17/2020    Pt not a very good historian. Admitted with  lower abdominal pain and has had some rectal bleeding. No fever or chills. Baseline creatinine is not known but admission creatinine was 3.8. COVID-19 ruled out. Subjective:  Ms. Yanni Portillo denies any abdominal pain today. Status post endoscopic procedure today   -EGD showed gastritis- biopsied   -Colonoscopy showed -mild colitis -biopsy sent     patient is back from procedure ,diet advanced and is tolerating it well.  she denies any complaints to me today. Creatinine still trending up nephrology following. Amlodipine added for hypertension    Telemetry-> today , shows normal pacemaker spikes. No issues. Objective:   /84   Pulse 88   Temp 98 °F (36.7 °C) (Oral)   Resp 16   Wt 98 lb 3.2 oz (44.5 kg)   SpO2 96%       Intake/Output Summary (Last 24 hours) at 7/22/2020 1634  Last data filed at 7/22/2020 1350  Gross per 24 hour   Intake 420 ml   Output 300 ml   Net 120 ml       Physical Exam:  General appearance: alert, appears stated age and cooperative  Head: Normocephalic, without obvious abnormality, atraumatic  Eyes: conjunctivae/corneas clear. PERRL, EOM's intact. Neck: no adenopathy, no carotid bruit, no JVD, supple, symmetrical, trachea midline and thyroid not enlarged, symmetric, no tenderness/mass/nodules  Lungs: clear to auscultation bilaterally  Heart: regular rate and rhythm, S1, S2 normal, no murmur, click, rub or gallop  Abdomen: soft, non-tender; bowel sounds normal; no masses,  no organomegaly  Extremities: Bilateral upper extremity trace edema present.   Skin: Skin color, texture, turgor normal. No rashes or lesions  Neurologic: Grossly normal    Scheduled Meds:   sodium bicarbonate  650 mg Oral 4x Daily    amLODIPine  5 mg Oral Daily    sodium chloride flush  10 mL Intravenous 2 times per day    cefTRIAXone (ROCEPHIN) IV  1 g Intravenous Q24H    metroNIDAZOLE  500 mg Intravenous Q8H    levETIRAcetam  500 mg Oral BID    raloxifene  60 mg Oral Daily    isosorbide mononitrate  30 mg Oral Daily    sertraline  50 mg Oral Daily    pravastatin  40 mg Oral Nightly    pantoprazole  40 mg Intravenous BID       Continuous Infusions:      PRN Meds:  sodium chloride flush, acetaminophen **OR** acetaminophen, promethazine **OR** ondansetron      Data:  CBC:   Recent Labs     07/20/20  0536  07/21/20  0520  07/21/20  1657 07/21/20  2358 07/22/20  0614   WBC 6.6  --  7.5  --   --   --  9.5   HGB 10.1*   < > 10.5*   < > 10.7* 9.7* 9.9*  9.9*   HCT 29.7*   < > 31.4*   < > 33.3* 29.1* 30.0*  30.0*   MCV 92.3  --  93.3  --   --   --  94.4     --  155  --   --   --  171    < > = values in this interval not displayed. BMP:   Recent Labs     07/20/20  0536 07/21/20  0520 07/22/20  0614    136 135*   K 3.6 3.9 3.7    102 100   CO2 21 20* 13*   PHOS 5.4* 6.1* 6.1*   BUN 71* 74* 70*   CREATININE 3.7* 3.9* 4.1*     Cultures: Results for Kaylah Chavez (MRN [id number]) as of 7/19/2020 11:10   Ref. Range 7/19/2020 03:10   White Blood Cells (WBC), Stool Unknown Negative. ..   C DIFF TOXIN/ANTIGEN Unknown Rpt   GI Bacterial Pathogens By PCR Unknown No Shigella spp/E. .. Cryptosporidium Ag Unknown Negative. ..   E Histolytica Ag Unknown Negative. .. Giardia Ag, Stl Unknown Negative. .. C.diff Toxin/Antigen Unknown Negative for Clos. ..    Susceptibility     Proteus mirabilis (1)     Antibiotic  Interpretation  NAN  Status    ampicillin  Sensitive  <=8  mcg/mL     ceFAZolin  Sensitive  <=2  mcg/mL     cefepime  Sensitive  <=2  mcg/mL     cefTRIAXone  Sensitive  <=1  mcg/mL     cefuroxime  Sensitive  <=4  mcg/mL     ciprofloxacin  Resistant  >2  mcg/mL     ertapenem  Sensitive  <=0.5  mcg/mL     gentamicin  Sensitive  <=4  mcg/mL     meropenem  Sensitive  <=1  mcg/mL     nitrofurantoin  Resistant  64  mcg/mL     piperacillin-tazobactam  Sensitive  <=16  mcg/mL

## 2020-07-22 NOTE — PROGRESS NOTES
[facility]       Progress Note           Subjective/   72y.o. year old female who we are seeing in consultation for JEN. HPI:  Renal function slow to improve despite IVF's, prn lasix on 7/21 with 350 ml of urine over last   24 hours. Intake reduced. ROS:  Denies any sob.  +weak. Still with abdominal pain. Objective/   GEN:  Chronically ill, /82   Pulse 116   Temp 97.3 °F (36.3 °C) (Oral)   Resp 16   Wt 98 lb 3.2 oz (44.5 kg)   SpO2 92%   HEENT: non-icteric, no JVD  CV: S1, S2 without m/r/g; + UE/LE edema  RESP: CTA B without w/r/r; breathing wnl  ABD: +bs, soft, nt, no hsm  SKIN: warm, no rashes    Data/  Recent Labs     07/20/20  0536  07/21/20  0520 07/21/20  1657 07/21/20  2358 07/22/20  0614   WBC 6.6  --  7.5  --   --   --  9.5   HGB 10.1*   < > 10.5*   < > 10.7* 9.7* 9.9*  9.9*   HCT 29.7*   < > 31.4*   < > 33.3* 29.1* 30.0*  30.0*   MCV 92.3  --  93.3  --   --   --  94.4     --  155  --   --   --  171    < > = values in this interval not displayed. Recent Labs     07/20/20  0536 07/21/20  0520 07/22/20  0614    136 135*   K 3.6 3.9 3.7    102 100   CO2 21 20* 13*   GLUCOSE 108* 90 64*   PHOS 5.4* 6.1* 6.1*   BUN 71* 74* 70*   CREATININE 3.7* 3.9* 4.1*   LABGLOM 12* 11* 11*   GFRAA 15* 14* 13*       Assessment/     -JEN in the setting of hematochezia, no documented hypotension but patient likely has   hemodynamically mediated JEN with possibly ATN.   Urinalysis is concerning for UTI               Unclear what what her baseline renal function is, she had ATN back in December 2019 when SCr was 1.9, improved to 1.2 from Jan 2020, up to 2.6-2.9 from earlier in July 13-15, 2020   Follows with Dr. Patricia Evans in office     -Proteus mirabilis UTI   On ceftriaxone     -Severe metabolic acidosis likely related to JEN and unclear if she has diarrhea or not   Improved with IVF's    -Hematochezia    -Acute blood loss anemia    Prn prbc's    -Proctitis Plans per GI    -HTN       Plan/     - Monitoring off of IVF's, lasix for now   - Started amlodipine 5 mg daily for BP control  - Start oral sodium bicarbonate replacement 650 mg po four times a day  - Trend labs, bp's

## 2020-07-22 NOTE — BRIEF OP NOTE
Brief Postoperative Note      Patient: Louisa Bear  YOB: 1950  MRN: [id number]    Date of Procedure: 7/22/2020    Pre-Op Diagnosis: Anemia, proctitis    Post-Op Diagnosis: gastritis, mild colitis; biopsies taken       Procedure(s):  EGD BIOPSY  COLONOSCOPY WITH BIOPSY    Surgeon(s):  Natali Torres MD    Assistant:  * No surgical staff found *    Anesthesia: Monitor Anesthesia Care    Estimated Blood Loss (mL): Minimal    Complications: None    Specimens:   ID Type Source Tests Collected by Time Destination   A :  Tissue Tissue SURGICAL PATHOLOGY Natali Torres MD 7/22/2020 4287    B :  Tissue Tissue SURGICAL PATHOLOGY Natali Torres MD 7/22/2020 2290    C :  Tissue Tissue SURGICAL PATHOLOGY Natali Torres MD 7/22/2020 4091        Implants:  * No implants in log *      Drains:   Urethral Catheter Non-latex 16 fr (Active)   Catheter Indications Need for fluid management in critically ill patients in a critical care setting not able to be managed by other means such as BSC with hat, bedpan, urinal, condom catheter, or short term intermittent urethral catherization 07/22/20 0740   Site Assessment Pink;Moist 07/22/20 0740   Urine Color Cici 07/22/20 0740   Urine Appearance Clear 07/21/20 2120   Output (mL) 100 mL 07/22/20 0212       Findings: gastritis, mild colitis; biopsies taken    Recommendation  1. Continue supportive care  2. PPI daily  3. Resume diet  4. Await pathology  5.  IV iron infusion  6, OK to d/c from GI standpoint, with close outpt followup pending biopsy results    Electronically signed by Natali Torres MD on 7/22/2020 at 9:12 AM

## 2020-07-22 NOTE — PROGRESS NOTES
Patient in OPD for procedure. Panic lab value of CO2 - 13 was reported to Dr. Russell Roque and Dr. Phelps Diss.

## 2020-07-22 NOTE — ANESTHESIA PRE PROCEDURE
Department of Anesthesiology  Preprocedure Note       Name:  Isaias Becerra   Age:  72 y.o.  :  1950                                          MRN:  [id number]         Date:  2020      Surgeon: Eloisa Perera):  Dell Gleason MD    Procedure: Procedure(s):  EGD  COLON    Medications prior to admission:   Prior to Admission medications    Medication Sig Start Date End Date Taking? Authorizing Provider   bismuth subsalicylate (PEPTO BISMOL) 525 MG/15ML suspension Take 30 mLs by mouth every 8 hours as needed for Indigestion   Yes Historical Provider, MD   HYDROcodone-acetaminophen (NORCO) 5-325 MG per tablet Take 1 tablet by mouth every 6 hours as needed for Pain.    Yes Historical Provider, MD   lactulose (CHRONULAC) 10 GM/15ML solution Take 20 g by mouth daily as needed   Yes Historical Provider, MD   Nutritional Supplements (RESOURCE 2.0 PO) Take 120 mLs by mouth 3 times daily   Yes Historical Provider, MD   levothyroxine (SYNTHROID) 75 MCG tablet Take 75 mcg by mouth Daily   Yes Historical Provider, MD   ARIPiprazole (ABILIFY) 10 MG tablet Take 10 mg by mouth daily   Yes Historical Provider, MD   aspirin 81 MG EC tablet Take 81 mg by mouth daily   Yes Historical Provider, MD   docusate sodium (COLACE) 100 MG capsule Take 100 mg by mouth 2 times daily   Yes Historical Provider, MD   doxycycline hyclate (VIBRAMYCIN) 100 MG capsule Take 100 mg by mouth 2 times daily   Yes Historical Provider, MD   apixaban (ELIQUIS) 5 MG TABS tablet Take 5 mg by mouth 2 times daily   Yes Historical Provider, MD   ferrous sulfate (FE TABS 325) 325 (65 Fe) MG EC tablet Take 325 mg by mouth 2 times daily   Yes Historical Provider, MD   isosorbide mononitrate (IMDUR) 30 MG extended release tablet Take 30 mg by mouth daily   Yes Historical Provider, MD   levETIRAcetam (KEPPRA) 500 MG tablet Take 500 mg by mouth 2 times daily   Yes Historical Provider, MD   raloxifene (EVISTA) 60 MG tablet Take 60 mg by mouth daily   Yes Historical Provider, MD   senna (SENOKOT) 8.6 MG tablet Take 1 tablet by mouth 2 times daily   Yes Historical Provider, MD   vitamin C (ASCORBIC ACID) 500 MG tablet Take 500 mg by mouth daily   Yes Historical Provider, MD   sertraline (ZOLOFT) 50 MG tablet Take 50 mg by mouth daily   Yes Historical Provider, MD   pravastatin (PRAVACHOL) 40 MG tablet Take 40 mg by mouth daily   Yes Historical Provider, MD   acetaminophen (TYLENOL) 500 MG tablet Take 500 mg by mouth every 4 hours as needed for Pain   Yes Historical Provider, MD   ondansetron (ZOFRAN) 4 MG tablet Take 4 mg by mouth every 6 hours as needed for Nausea or Vomiting   Yes Historical Provider, MD       Current medications:    Current Facility-Administered Medications   Medication Dose Route Frequency Provider Last Rate Last Dose    amLODIPine (NORVASC) tablet 5 mg  5 mg Oral Daily Natalie Lara MD   5 mg at 07/21/20 0850    sodium chloride flush 0.9 % injection 10 mL  10 mL Intravenous 2 times per day Brian Prajapati MD   10 mL at 07/21/20 2209    sodium chloride flush 0.9 % injection 10 mL  10 mL Intravenous PRN Brian Prajapati MD   10 mL at 07/20/20 1845    acetaminophen (TYLENOL) tablet 650 mg  650 mg Oral Q6H PRN Brian Prajapati MD        Or    acetaminophen (TYLENOL) suppository 650 mg  650 mg Rectal Q6H PRN Brian Prajapati MD        promethazine (PHENERGAN) tablet 12.5 mg  12.5 mg Oral Q6H PRN Brian Prajapati MD        Or    ondansetron (ZOFRAN) injection 4 mg  4 mg Intravenous Q6H PRN Brian Prajapati MD   4 mg at 07/20/20 0805    cefTRIAXone (ROCEPHIN) 1 g IVPB in 50 mL D5W minibag  1 g Intravenous Q24H Brian Prajapati MD   Stopped at 07/21/20 1828    metronidazole (FLAGYL) 500 mg in NaCl 100 mL IVPB premix  500 mg Intravenous Q8H Brian Prajapati MD   Stopped at 07/22/20 0105    levETIRAcetam (KEPPRA) tablet 500 mg  500 mg Oral BID Brian Prajapati MD   500 mg at 07/21/20 2210    raloxifene (EVISTA) tablet 60 mg  60 mg Oral Daily Brinda York MD   60 mg at 07/21/20 0858    isosorbide mononitrate (IMDUR) extended release tablet 30 mg  30 mg Oral Daily Brinad York MD   30 mg at 07/21/20 0850    sertraline (ZOLOFT) tablet 50 mg  50 mg Oral Daily Brinda York MD   50 mg at 07/21/20 0848    pravastatin (PRAVACHOL) tablet 40 mg  40 mg Oral Nightly Brinda York MD   40 mg at 07/21/20 2210    pantoprazole (PROTONIX) injection 40 mg  40 mg Intravenous BID Brinda York MD   40 mg at 07/21/20 2210       Allergies: Allergies   Allergen Reactions    Penicillins Rash       Problem List:    Patient Active Problem List   Diagnosis Code    JEN (acute kidney injury) ([facility] [address] N17.9    Seizures ([facility] [address] R56.9    Acquired hypothyroidism E03.9    GI bleed K92.2    Proctitis K62.89    Acute blood loss anemia S47    Metabolic acidosis E69.8    Anemia D64.9       Past Medical History:        Diagnosis Date    Anemia     Cognitive communication deficit     Convulsions ([facility] [address]     Depressive disorder     Muscle wasting     Osteomyelitis of lumbar spine ([facility] [address]     Pacemaker     medtronic    Sepsis [facility])        Past Surgical History:  History reviewed. No pertinent surgical history.     Social History:    Social History     Tobacco Use    Smoking status: Never Smoker    Smokeless tobacco: Never Used   Substance Use Topics    Alcohol use: Not on file                                Counseling given: Not Answered      Vital Signs (Current):   Vitals:    07/21/20 1400 07/21/20 1930 07/22/20 0212 07/22/20 0810   BP: 132/83 134/81 136/82 (!) 146/91   Pulse: 105 117 101 111   Resp: 16 16 16 18   Temp: 97 °F (36.1 °C) 97.2 °F (36.2 °C) 97 °F (36.1 °C) 98.7 °F (37.1 °C)   TempSrc: Oral Oral Oral Temporal   SpO2: 91% 97% 95% 97%   Weight:   98 lb 3.2 oz (44.5 kg)                                               BP Readings from Last 3 Encounters:   07/22/20 (!) 146/91       NPO Status: BMI:   Wt Readings from Last 3 Encounters:   07/22/20 98 lb 3.2 oz (44.5 kg)     There is no height or weight on file to calculate BMI.    CBC:   Lab Results   Component Value Date    WBC 9.5 07/22/2020    RBC 3.18 07/22/2020    HGB 9.9 07/22/2020    HGB 9.9 07/22/2020    HCT 30.0 07/22/2020    HCT 30.0 07/22/2020    MCV 94.4 07/22/2020    RDW 16.2 07/22/2020     07/22/2020       CMP:   Lab Results   Component Value Date     07/22/2020    K 3.7 07/22/2020    K 4.8 07/17/2020     07/22/2020    CO2 13 07/22/2020    BUN 70 07/22/2020    CREATININE 4.1 07/22/2020    GFRAA 13 07/22/2020    AGRATIO 0.8 07/17/2020    LABGLOM 11 07/22/2020    GLUCOSE 64 07/22/2020    PROT 5.5 07/17/2020    CALCIUM 8.3 07/22/2020    BILITOT <0.2 07/17/2020    ALKPHOS 147 07/17/2020    AST 19 07/17/2020    ALT 11 07/17/2020       POC Tests: No results for input(s): POCGLU, POCNA, POCK, POCCL, POCBUN, POCHEMO, POCHCT in the last 72 hours.     Coags:   Lab Results   Component Value Date    PROTIME 19.6 07/17/2020    INR 1.68 07/17/2020    APTT 38.7 07/17/2020       HCG (If Applicable): No results found for: PREGTESTUR, PREGSERUM, HCG, HCGQUANT     ABGs: No results found for: PHART, PO2ART, GYB4ONT, HVM1ZMT, BEART, A6HPJHIF     Type & Screen (If Applicable):  No results found for: LABABO, LABRH    Drug/Infectious Status (If Applicable):  No results found for: HIV, HEPCAB    COVID-19 Screening (If Applicable):   Lab Results   Component Value Date    COVID19 Not Detected 07/17/2020         Anesthesia Evaluation  Patient summary reviewed and Nursing notes reviewed  Airway: Mallampati: IV  TM distance: <3 FB   Neck ROM: full  Mouth opening: < 3 FB Dental:    (+) edentulous      Pulmonary:Negative Pulmonary ROS   (+) decreased breath sounds,                             Cardiovascular:    (+) pacemaker:,         Rhythm: regular  Rate: normal                   PE comment: distant   Neuro/Psych:   (+) seizures:, psychiatric history:depression/anxiety             GI/Hepatic/Renal: Neg GI/Hepatic/Renal ROS            Endo/Other:    (+) hypothyroidism::., .                 Abdominal:           Vascular: negative vascular ROS. Anesthesia Plan      MAC     ASA 3     (HCO3 noted on BMP)  Induction: intravenous. Anesthetic plan and risks discussed with patient. Plan discussed with CRNA.                   Savage Stephen MD   7/22/2020

## 2020-07-22 NOTE — PLAN OF CARE
Problem: Falls - Risk of:  Goal: Will remain free from falls  Description: Will remain free from falls  7/22/2020 0747 by Diane Perez RN  Outcome: Ongoing  7/22/2020 0142 by Roseline Arvizu RN  Outcome: Ongoing  Goal: Absence of physical injury  Description: Absence of physical injury  Outcome: Ongoing     Problem: Infection:  Goal: Will remain free from infection  Description: Will remain free from infection  7/22/2020 0747 by Diane Perez RN  Outcome: Ongoing  7/22/2020 0142 by Roseline Arvizu RN  Outcome: Ongoing     Problem: Safety:  Goal: Free from accidental physical injury  Description: Free from accidental physical injury  Outcome: Ongoing  Goal: Free from intentional harm  Description: Free from intentional harm  Outcome: Ongoing     Problem: Daily Care:  Goal: Daily care needs are met  Description: Daily care needs are met  7/22/2020 0747 by Diane Perez RN  Outcome: Ongoing  7/22/2020 0142 by Roseline Arvizu RN  Outcome: Ongoing     Problem: Pain:  Goal: Patient's pain/discomfort is manageable  Description: Patient's pain/discomfort is manageable  7/22/2020 0747 by Diane Perez RN  Outcome: Ongoing  7/22/2020 0142 by Roseline Arvizu RN  Outcome: Ongoing     Problem: Skin Integrity:  Goal: Skin integrity will stabilize  Description: Skin integrity will stabilize  7/22/2020 0747 by Diane Perez RN  Outcome: Ongoing  7/22/2020 0142 by Roseline Arvizu RN  Outcome: Ongoing     Problem: Discharge Planning:  Goal: Patients continuum of care needs are met  Description: Patients continuum of care needs are met  7/22/2020 0747 by Diane Perez RN  Outcome: Ongoing  7/22/2020 0142 by Roseline Arvizu RN  Outcome: Ongoing

## 2020-07-23 LAB
ALBUMIN SERPL-MCNC: 1.8 G/DL (ref 3.4–5)
ANION GAP SERPL CALCULATED.3IONS-SCNC: 16 MMOL/L (ref 3–16)
BASOPHILS ABSOLUTE: 0.1 K/UL (ref 0–0.2)
BASOPHILS RELATIVE PERCENT: 0.9 %
BUN BLDV-MCNC: 66 MG/DL (ref 7–20)
CALCIUM SERPL-MCNC: 7.7 MG/DL (ref 8.3–10.6)
CHLORIDE BLD-SCNC: 106 MMOL/L (ref 99–110)
CO2: 17 MMOL/L (ref 21–32)
CREAT SERPL-MCNC: 4.2 MG/DL (ref 0.6–1.2)
EOSINOPHILS ABSOLUTE: 0.2 K/UL (ref 0–0.6)
EOSINOPHILS RELATIVE PERCENT: 2.1 %
GFR AFRICAN AMERICAN: 13
GFR NON-AFRICAN AMERICAN: 10
GLUCOSE BLD-MCNC: 102 MG/DL (ref 70–99)
GLUCOSE BLD-MCNC: 176 MG/DL (ref 70–99)
HCT VFR BLD CALC: 28 % (ref 36–48)
HEMOGLOBIN: 9.1 G/DL (ref 12–16)
LYMPHOCYTES ABSOLUTE: 1.3 K/UL (ref 1–5.1)
LYMPHOCYTES RELATIVE PERCENT: 16.9 %
MCH RBC QN AUTO: 30.2 PG (ref 26–34)
MCHC RBC AUTO-ENTMCNC: 32.5 G/DL (ref 31–36)
MCV RBC AUTO: 93 FL (ref 80–100)
MONOCYTES ABSOLUTE: 0.7 K/UL (ref 0–1.3)
MONOCYTES RELATIVE PERCENT: 9.1 %
NEUTROPHILS ABSOLUTE: 5.4 K/UL (ref 1.7–7.7)
NEUTROPHILS RELATIVE PERCENT: 71 %
PDW BLD-RTO: 16.1 % (ref 12.4–15.4)
PERFORMED ON: ABNORMAL
PHOSPHORUS: 6.1 MG/DL (ref 2.5–4.9)
PLATELET # BLD: 178 K/UL (ref 135–450)
PMV BLD AUTO: 7.4 FL (ref 5–10.5)
POTASSIUM SERPL-SCNC: 3 MMOL/L (ref 3.5–5.1)
RBC # BLD: 3.01 M/UL (ref 4–5.2)
SODIUM BLD-SCNC: 139 MMOL/L (ref 136–145)
WBC # BLD: 7.6 K/UL (ref 4–11)

## 2020-07-23 PROCEDURE — 6360000002 HC RX W HCPCS: Performed by: INTERNAL MEDICINE

## 2020-07-23 PROCEDURE — 6370000000 HC RX 637 (ALT 250 FOR IP): Performed by: INTERNAL MEDICINE

## 2020-07-23 PROCEDURE — 99232 SBSQ HOSP IP/OBS MODERATE 35: CPT | Performed by: INTERNAL MEDICINE

## 2020-07-23 PROCEDURE — 2580000003 HC RX 258: Performed by: INTERNAL MEDICINE

## 2020-07-23 PROCEDURE — C9113 INJ PANTOPRAZOLE SODIUM, VIA: HCPCS | Performed by: INTERNAL MEDICINE

## 2020-07-23 PROCEDURE — 2500000003 HC RX 250 WO HCPCS: Performed by: INTERNAL MEDICINE

## 2020-07-23 PROCEDURE — 80069 RENAL FUNCTION PANEL: CPT

## 2020-07-23 PROCEDURE — 2060000000 HC ICU INTERMEDIATE R&B

## 2020-07-23 PROCEDURE — 6370000000 HC RX 637 (ALT 250 FOR IP): Performed by: PHYSICIAN ASSISTANT

## 2020-07-23 PROCEDURE — 85025 COMPLETE CBC W/AUTO DIFF WBC: CPT

## 2020-07-23 PROCEDURE — 36415 COLL VENOUS BLD VENIPUNCTURE: CPT

## 2020-07-23 RX ORDER — POTASSIUM CHLORIDE 20 MEQ/1
40 TABLET, EXTENDED RELEASE ORAL ONCE
Status: COMPLETED | OUTPATIENT
Start: 2020-07-23 | End: 2020-07-23

## 2020-07-23 RX ORDER — FUROSEMIDE 10 MG/ML
40 INJECTION INTRAMUSCULAR; INTRAVENOUS ONCE
Status: COMPLETED | OUTPATIENT
Start: 2020-07-23 | End: 2020-07-23

## 2020-07-23 RX ADMIN — PRAVASTATIN SODIUM 40 MG: 40 TABLET ORAL at 21:49

## 2020-07-23 RX ADMIN — PANTOPRAZOLE SODIUM 40 MG: 40 INJECTION, POWDER, FOR SOLUTION INTRAVENOUS at 21:49

## 2020-07-23 RX ADMIN — SERTRALINE 50 MG: 50 TABLET, FILM COATED ORAL at 09:27

## 2020-07-23 RX ADMIN — Medication 10 ML: at 21:49

## 2020-07-23 RX ADMIN — METRONIDAZOLE 500 MG: 500 INJECTION, SOLUTION INTRAVENOUS at 00:21

## 2020-07-23 RX ADMIN — LEVETIRACETAM 500 MG: 500 TABLET ORAL at 21:49

## 2020-07-23 RX ADMIN — PANTOPRAZOLE SODIUM 40 MG: 40 INJECTION, POWDER, FOR SOLUTION INTRAVENOUS at 09:28

## 2020-07-23 RX ADMIN — Medication 10 ML: at 07:38

## 2020-07-23 RX ADMIN — Medication 10 ML: at 12:21

## 2020-07-23 RX ADMIN — SODIUM BICARBONATE 650 MG: 650 TABLET ORAL at 09:27

## 2020-07-23 RX ADMIN — SODIUM BICARBONATE 650 MG: 650 TABLET ORAL at 12:21

## 2020-07-23 RX ADMIN — LEVETIRACETAM 500 MG: 500 TABLET ORAL at 09:26

## 2020-07-23 RX ADMIN — SODIUM BICARBONATE 650 MG: 650 TABLET ORAL at 16:48

## 2020-07-23 RX ADMIN — AMLODIPINE BESYLATE 5 MG: 5 TABLET ORAL at 09:26

## 2020-07-23 RX ADMIN — SODIUM BICARBONATE 650 MG: 650 TABLET ORAL at 21:49

## 2020-07-23 RX ADMIN — RALOXIFENE HYDROCHLORIDE 60 MG: 60 TABLET, FILM COATED ORAL at 09:29

## 2020-07-23 RX ADMIN — FUROSEMIDE 40 MG: 10 INJECTION, SOLUTION INTRAMUSCULAR; INTRAVENOUS at 12:21

## 2020-07-23 RX ADMIN — METRONIDAZOLE 500 MG: 500 INJECTION, SOLUTION INTRAVENOUS at 16:47

## 2020-07-23 RX ADMIN — POTASSIUM CHLORIDE 40 MEQ: 1500 TABLET, EXTENDED RELEASE ORAL at 09:41

## 2020-07-23 RX ADMIN — ISOSORBIDE MONONITRATE 30 MG: 30 TABLET ORAL at 09:25

## 2020-07-23 RX ADMIN — METRONIDAZOLE 500 MG: 500 INJECTION, SOLUTION INTRAVENOUS at 07:37

## 2020-07-23 RX ADMIN — CEFTRIAXONE SODIUM 1 G: 1 INJECTION, POWDER, FOR SOLUTION INTRAMUSCULAR; INTRAVENOUS at 18:15

## 2020-07-23 ASSESSMENT — PAIN SCALES - GENERAL: PAINLEVEL_OUTOF10: 0

## 2020-07-23 NOTE — PROGRESS NOTES
Molina Dorantes, charge nurse spoke to me regarding patient's pacemaker showing a failure to capture. Pt has a [employer] pacer and the ED and PCU do not have an interrogator for that device. After reviewing the chart the patient had a negative COVID-19 screening and is a PCU patient. I spoke with Halina Membreno , and the decision was made to transfer the patient to PCU. Slava Hudson will reach out to the pacemaker rep and inform him that he needs to come out and interrogate the device.

## 2020-07-23 NOTE — PROGRESS NOTES
7/23/20 1043 called [employer] [phone] to have pacemaker interrogated . She has a DDD pacemaker and they stated will call with an ETA to Constance Addison.  Marshall Bustamante MT/HOLGER

## 2020-07-23 NOTE — PROGRESS NOTES
Patient transferred to room 324-1 from . Patient oriented to room, call light, bed rails, phone, lights and bathroom. Patient instructed about the schedule of the day including: vital sign frequency, lab draws, possible tests, frequency of MD and staff rounds, daily weights, I &O's and prescribed diet. Bed alarm in place, patient aware of placement and reason. Telemetry box in place, patient aware of placement and reason. Bed locked, in lowest position, side rails up 2/4, call light within reach. Recliner Assessment  Patient is able to demonstrated the ability to move from a reclining position to an upright position within the recliner. 4 Eyes Skin Assessment     The patient is being assess for   Transfer to New Unit    I agree that 2 RN's have performed a thorough Head to Toe Skin Assessment on the patient. ALL assessment sites listed below have been assessed. Areas assessed by both nurses:   [x]   Head, Face, and Ears   [x]   Shoulders, Back, and Chest, Abdomen  [x]   Arms, Elbows, and Hands   [x]   Coccyx, Sacrum, and Ischium  [x]   Legs, Feet, and Heels        Scattered bruising, blanchable redness to coccyx    **SHARE this note so that the co-signing nurse is able to place an eSignature**    Co-signer eSignature: BHARTI Cerrato    Does the Patient have Skin Breakdown?   No          Brent Prevention initiated:  No   Wound Care Orders initiated:  No      [facility] nurse consulted for Pressure Injury (Stage 3,4, Unstageable, DTI, NWPT, Complex wounds)and New or Established Ostomies:  No      Primary Nurse eSignature: Electronically signed by Elena Raza RN on 7/23/20 at 2:29 PM EDT

## 2020-07-23 NOTE — PROGRESS NOTES
PROGRESS NOTE  S:70 yrs Patient  admitted on 7/17/2020 with Acute kidney injury ([facility] [address] [N17.9] . Today she complains of Nausea and Bloating. States she passed a soft BM yesterday. Exam:   Vitals:    07/23/20 0800   BP: (!) 143/85   Pulse: 102   Resp: 18   Temp: 97.1 °F (36.2 °C)   SpO2: 95%      General appearance: alert, appears stated age, cooperative and no distress  HEENT: Oropharynx clear, no lesions  Neck: no adenopathy and supple, symmetrical, trachea midline  Lungs: clear to auscultation bilaterally  Heart: regular rate and rhythm, S1, S2 normal, no murmur, click, rub or gallop  Abdomen: normal findings: no masses palpable, soft, non-tender and symmetric and abnormal findings:  distended and hypoactive bowel sounds  Extremities: extremities normal, atraumatic, no cyanosis or edema     Medications: Reviewed    Labs:  CBC:   Recent Labs     07/21/20  0520  07/21/20  2358 07/22/20  0614 07/23/20  0559   WBC 7.5  --   --  9.5 7.6   HGB 10.5*   < > 9.7* 9.9*  9.9* 9.1*   HCT 31.4*   < > 29.1* 30.0*  30.0* 28.0*   MCV 93.3  --   --  94.4 93.0     --   --  171 178    < > = values in this interval not displayed. BMP:   Recent Labs     07/21/20  0520 07/22/20  0614 07/23/20  0559    135* 139   K 3.9 3.7 3.0*    100 106   CO2 20* 13* 17*   PHOS 6.1* 6.1* 6.1*   BUN 74* 70* 66*   CREATININE 3.9* 4.1* 4.2*     LIVER PROFILE: No results for input(s): AST, ALT, LIPASE, PROT, BILIDIR, BILITOT, ALKPHOS in the last 72 hours. Invalid input(s): AMYLASE,  ALB      PROCEDURE:  1. EGD with biopsy. 2.  Colonoscopy to the cecum with biopsy. PRE-PROCEDURE DIAGNOSES:  1. Anemia. 2.  Proctitis per CT scan. POST-PROCEDURE DIAGNOSES:  1.  Mild gastritis. 2.  Otherwise normal EGD. 3.  Diffuse colitis, likely infectious versus ischemic, inflammatory. 4.  Internal hemorrhoids.   5.  No active bleeding.     Attending Supervising Physician's Attestation Statement  The patient is a 72 y.o. female. I have performed a history and physical examination of the patient. I discussed the case with my physician assistant Joselyn Thomson PA-C    I reviewed the patient's Past Medical History, Past Surgical History, Medications, and Allergies. Physical Exam:  Vitals:    07/23/20 0800 07/23/20 1000 07/23/20 1200 07/23/20 1715   BP: (!) 143/85 (!) 140/86 130/81 133/88   Pulse: 102 113 93 96   Resp: 18 20 16 16   Temp: 97.1 °F (36.2 °C) 97.6 °F (36.4 °C) 97.7 °F (36.5 °C) 97.8 °F (36.6 °C)   TempSrc: Oral Oral Oral Oral   SpO2: 95%  96% 97%   Weight:           Physical Examination: General appearance - alert, well appearing, and in no distress  Mental status - alert, oriented to person, place, and time  Eyes - pupils equal and reactive, extraocular eye movements intact  Neck - supple, no significant adenopathy  Chest - clear to auscultation, no wheezes, rales or rhonchi, symmetric air entry  Heart - normal rate, regular rhythm, normal S1, S2, no murmurs, rubs, clicks or gallops  Abdomen - soft, nontender, nondistended, no masses or organomegaly  Extremities - no pedal edema noted        Impression: 72year old female with history of hypothyroidism, DVT, cardiac pacemaker, HLD, seizure do, osteomyelitis, depression, and dementia admitted with anemia and proctitis. EGD and colonoscopy yesterday showed mild gastritis and diffuse colitis likely infectious vs ischemic vs inflammatory. Recommendation:  1. Continue supportive care  2. Continue PPI qAM  3. Continue diet and advance as tolerated  4. Await pathology results  5. IV iron as needed  6. Ok to d/c from GI standpoint  7. Close outpatient f/u pending biopsy results      Elinor Caba PA-C  10:11 AM 7/23/2020                      72year old female with history of hypothyroidism, DVT, cardiac pacemaker, HLD, seizure do, osteomyelitis, depression, and dementia admitted with anemia and proctitis.  EGD and colonoscopy yesterday showed mild gastritis and diffuse colitis. Biopsies pending. Continue supportive care. Continue PPI. Advance diet. Ok to d/c from GI standpoint. F/u in clinic upon discharge.     Mario Duke MD          [phone]  [phone]

## 2020-07-23 NOTE — PROGRESS NOTES
[facility]       Progress Note           Subjective/   72y.o. year old female who we are seeing in consultation for JEN. HPI:  Renal function slow to improve despite IVF's, now off, prn lasix on 7/21 due to worsening sob. Urine output of 200 ml over last 24 hours. Intake reduced. ROS:  Denies any sob.  +weak. Still with abdominal pain. Objective/   GEN:  Chronically ill, BP (!) 140/86   Pulse 113   Temp 97.6 °F (36.4 °C) (Oral)   Resp 20   Wt 97 lb 4.8 oz (44.1 kg)   SpO2 95%   HEENT: non-icteric, no JVD  CV: S1, S2, tachycardic without m/r/g; ++ UE/LE edema  RESP: CTA B without w/r/r; breathing wnl  ABD: +bs, soft, nt, no hsm  SKIN: warm, no rashes    Data/  Recent Labs     07/21/20  0520  07/21/20  2358 07/22/20  0614 07/23/20  0559   WBC 7.5  --   --  9.5 7.6   HGB 10.5*   < > 9.7* 9.9*  9.9* 9.1*   HCT 31.4*   < > 29.1* 30.0*  30.0* 28.0*   MCV 93.3  --   --  94.4 93.0     --   --  171 178    < > = values in this interval not displayed. Recent Labs     07/21/20  0520 07/22/20  0614 07/23/20  0559    135* 139   K 3.9 3.7 3.0*    100 106   CO2 20* 13* 17*   GLUCOSE 90 64* 102*   PHOS 6.1* 6.1* 6.1*   BUN 74* 70* 66*   CREATININE 3.9* 4.1* 4.2*   LABGLOM 11* 11* 10*   GFRAA 14* 13* 13*       Assessment/     -JEN in the setting of hematochezia, no documented hypotension but patient likely has   hemodynamically mediated JEN with possibly ATN.   Urinalysis is concerning for UTI               Unclear what what her baseline renal function is, she had ATN back in December 2019 when SCr was 1.9, improved to 1.2 from Jan 2020, up to 2.6-2.9 from earlier in July 13-15, 2020   Follows with Dr. Marcos Dominguez in office     -Proteus mirabilis UTI   On ceftriaxone     -Severe metabolic acidosis likely related to JEN and unclear if she has diarrhea or not   Improved with IVF's, recurrent    -Hematochezia    -Acute blood loss anemia    Prn prbc's    -Proctitis    Plans per GI    -HTN    -Hypokalemia       Plan/     - Prn dose of IV lasix 40 mg today  - Started amlodipine 5 mg daily for BP control  - Started oral sodium bicarbonate replacement 650 mg po four times a day  - Trend labs, bp's, prn K replacement

## 2020-07-23 NOTE — PROGRESS NOTES
Pt had run of V-tach x2, 66 beat & 11 beat. No distress noted. PerfectServed Dr. Narayan Christine. Made him aware. No new orders. Pt has pacemaker, it is malfunctioning. Dayshift hospitalist aware per note.

## 2020-07-23 NOTE — PLAN OF CARE
Problem: Falls - Risk of:  Goal: Will remain free from falls  Description: Will remain free from falls  Outcome: Ongoing     Problem: Infection:  Goal: Will remain free from infection  Description: Will remain free from infection  Outcome: Ongoing     Problem: Daily Care:  Goal: Daily care needs are met  Description: Daily care needs are met  Outcome: Ongoing     Problem: Pain:  Goal: Patient's pain/discomfort is manageable  Description: Patient's pain/discomfort is manageable  Outcome: Ongoing     Problem: Skin Integrity:  Goal: Skin integrity will stabilize  Description: Skin integrity will stabilize  Outcome: Ongoing     Problem: Discharge Planning:  Goal: Patients continuum of care needs are met  Description: Patients continuum of care needs are met  Outcome: Ongoing

## 2020-07-23 NOTE — PROGRESS NOTES
AM assessment complete. Student nurses to admin meds with instructor at bedside. A/O x 3, Denies any pain. Set up for breakfast. Call light within reach.

## 2020-07-23 NOTE — PROGRESS NOTES
Bedside report given to [facility], RODNEY, RN. Patient in stable condition. Care transferred. GLADYS RushingN, RN.

## 2020-07-23 NOTE — FLOWSHEET NOTE
Pt A/Ox4, flat affect. Shift assessment complete. See flowsheet. Pm meds given. Pt lying in bed, no distress noted. Pt denies needs. Bed alarm on. Bed in low position. Will continue to monitor.        07/22/20 2041   Vital Signs   Temp 98 °F (36.7 °C)   Temp Source Oral   Pulse 108   Heart Rate Source Monitor   Resp 16   /83   BP Location Right upper arm   Patient Position Semi fowlers   Level of Consciousness 0   MEWS Score 2   Patient Currently in Pain Denies   Pain Assessment   Pain Assessment 0-10   Pain Level 0   Oxygen Therapy   SpO2 96 %   O2 Device None (Room air)

## 2020-07-23 NOTE — PROGRESS NOTES
Progress Note    Admit Date:  7/17/2020    Pt not a very good historian. Admitted with  lower abdominal pain and has had some rectal bleeding. No fever or chills. Baseline creatinine is not known but admission creatinine was 3.8. COVID-19 ruled out. Status post endoscopic procedure on 7/22/2020  -EGD showed gastritis- biopsied   -Colonoscopy showed -mild colitis -biopsy sent       Patient has a pacemaker in place, and we have noted  rhythm issues on telemetry. Sometimes loss of capture  -Last night she had episode of 66 beats of V. tach and then 11 beats of V. Tach. Patient seems and asymptomatic. Pacemaker spikes seen    Subjective:  Ms. Fariha Mart states she feels ill today and complains of nausea . No vomiting . Had a BM . Tolerating oral diet. Creatinine continues to trend up, nephrology following    Tele paced rhythm   [employer] rep to come and interrogate the pacemaker today. Objective:   BP (!) 140/86   Pulse 113   Temp 97.6 °F (36.4 °C) (Oral)   Resp 20   Wt 97 lb 4.8 oz (44.1 kg)   SpO2 95%       Intake/Output Summary (Last 24 hours) at 7/23/2020 1141  Last data filed at 7/23/2020 0800  Gross per 24 hour   Intake 480 ml   Output 200 ml   Net 280 ml       Physical Exam:  General appearance: alert, appears stated age and cooperative  Head: Normocephalic, without obvious abnormality, atraumatic  Eyes: conjunctivae/corneas clear. PERRL, EOM's intact. Neck: no adenopathy, no carotid bruit, no JVD, supple, symmetrical, trachea midline and thyroid not enlarged, symmetric, no tenderness/mass/nodules  Lungs: clear to auscultation bilaterally. No wheezes, rales or rhonchi  Heart: regular rate and rhythm, S1, S2 normal, no murmur, click, rub or gallop  Abdomen: soft, non-tender; bowel sounds normal; no masses,  no organomegaly  Extremities: Bilateral upper extremity trace edema present.   Skin: Skin color, texture, turgor normal. No rashes or lesions  Neurologic: Grossly normal    Scheduled Meds:   sodium bicarbonate  650 mg Oral 4x Daily    amLODIPine  5 mg Oral Daily    sodium chloride flush  10 mL Intravenous 2 times per day    cefTRIAXone (ROCEPHIN) IV  1 g Intravenous Q24H    metroNIDAZOLE  500 mg Intravenous Q8H    levETIRAcetam  500 mg Oral BID    raloxifene  60 mg Oral Daily    isosorbide mononitrate  30 mg Oral Daily    sertraline  50 mg Oral Daily    pravastatin  40 mg Oral Nightly    pantoprazole  40 mg Intravenous BID       Continuous Infusions:      PRN Meds:  sodium chloride flush, acetaminophen **OR** acetaminophen, promethazine **OR** ondansetron      Data:  CBC:   Recent Labs     07/21/20  0520  07/21/20  2358 07/22/20  0614 07/23/20  0559   WBC 7.5  --   --  9.5 7.6   HGB 10.5*   < > 9.7* 9.9*  9.9* 9.1*   HCT 31.4*   < > 29.1* 30.0*  30.0* 28.0*   MCV 93.3  --   --  94.4 93.0     --   --  171 178    < > = values in this interval not displayed. BMP:   Recent Labs     07/21/20  0520 07/22/20  0614 07/23/20  0559    135* 139   K 3.9 3.7 3.0*    100 106   CO2 20* 13* 17*   PHOS 6.1* 6.1* 6.1*   BUN 74* 70* 66*   CREATININE 3.9* 4.1* 4.2*     Cultures: Results for Autumn Parikh (MRN [id number]) as of 7/19/2020 11:10   Ref. Range 7/19/2020 03:10   White Blood Cells (WBC), Stool Unknown Negative. ..   C DIFF TOXIN/ANTIGEN Unknown Rpt   GI Bacterial Pathogens By PCR Unknown No Shigella spp/E. .. Cryptosporidium Ag Unknown Negative. ..   E Histolytica Ag Unknown Negative. .. Giardia Ag, Stl Unknown Negative. .. C.diff Toxin/Antigen Unknown Negative for Clos. ..    Susceptibility     Proteus mirabilis (1)     Antibiotic  Interpretation  NAN  Status    ampicillin  Sensitive  <=8  mcg/mL     ceFAZolin  Sensitive  <=2  mcg/mL     cefepime  Sensitive  <=2  mcg/mL     cefTRIAXone  Sensitive  <=1  mcg/mL     cefuroxime  Sensitive  <=4  mcg/mL     ciprofloxacin  Resistant  >2  mcg/mL     ertapenem  Sensitive  <=0.5  mcg/mL     gentamicin Sensitive  <=4  mcg/mL     meropenem  Sensitive  <=1  mcg/mL     nitrofurantoin  Resistant  64  mcg/mL     piperacillin-tazobactam  Sensitive  <=16  mcg/mL     trimethoprim-sulfamethoxazole  Sensitive  <=2/38  mcg/mL         COVID 19   Not detected. RADIOLOGY:    CT ABDOMEN PELVIS WO CONTRAST Additional Contrast? None   Final Result   Marked mural thickening of the rectum with perirectal fat stranding,   suggesting proctitis. That should be followed to resolution to ensure that   there is no underlying neoplasm. Small bilateral pleural effusions with minimal adjacent atelectasis. Assessment/Plan: JEN vs CKD  - Baseline labs not known  - Creatinine on admission 3.8-->3.7-->3.5-->3.7-->3.9--> 4.1 --> 4.2  - Nephrology consultation  - non-anion gap metabolic acidosis-->S/P IVF with bicarb. - Minimal urine OP documented, increasing edema . IV fluids discontinued ton 7/21and  1 dose of Lasix given, no Improvement in renal function.  -Nephrology following. Hold IVF and lasix and monitor. On PO Na bicarb now   -Munroe in place  -Continue strict I&Os,daily weights     Hypokalemia   - replete    UTI  Acute cystitis without hematuria  - Urine cultures growing Proteus  - Already on Rocephin. COVID-19 ruled out   - since she came from a nursing home  - COVID-19 TESTING negative     Proctitis  - Start on empiric Rocephin and Flagyl -> cont D#5   - GI consultation has been obtained. GI bleed  Acute blood loss anemia  -Her H&H was low-->guaiac positive. - s/p one unit of PRBC transfusion  - H and H stable now. -Status post endoscopic procedure today 7/22  -EGD showed gastritis- biopsied   -Colonoscopy showed -mild colitis -biopsy sent     Seizure DO  - Continue Keppra     Hypothyroidism   - Continue Synthroid    HTN  - BP is uncontrolled   - Continue Norvasc     Has Pacemaker  - monitor in tele. - interrogate the PM, [employer]      #SCD for DVT prophylaxis.   #DIET RENAL;  # DNR-CC       Saravanan Dotson Eder Guzman MD    7/23/2020

## 2020-07-23 NOTE — PROGRESS NOTES
Spoke with Anisa patterson from [employer] [phone]  r/t pacemaker loss of capture.  Will send rep out to interrogate pacemaker today or tomorrow am. Notified doctor, , Clinical.

## 2020-07-24 ENCOUNTER — APPOINTMENT (OUTPATIENT)
Dept: INTERVENTIONAL RADIOLOGY/VASCULAR | Age: 70
DRG: 377 | End: 2020-07-24
Payer: COMMERCIAL

## 2020-07-24 LAB
ALBUMIN SERPL-MCNC: 2 G/DL (ref 3.4–5)
ANION GAP SERPL CALCULATED.3IONS-SCNC: 15 MMOL/L (ref 3–16)
APTT: 35.4 SEC (ref 24.2–36.2)
BASOPHILS ABSOLUTE: 0 K/UL (ref 0–0.2)
BASOPHILS RELATIVE PERCENT: 0.7 %
BUN BLDV-MCNC: 67 MG/DL (ref 7–20)
CALCIUM SERPL-MCNC: 7.9 MG/DL (ref 8.3–10.6)
CHLORIDE BLD-SCNC: 107 MMOL/L (ref 99–110)
CO2: 19 MMOL/L (ref 21–32)
CREAT SERPL-MCNC: 4.5 MG/DL (ref 0.6–1.2)
EOSINOPHILS ABSOLUTE: 0.2 K/UL (ref 0–0.6)
EOSINOPHILS RELATIVE PERCENT: 3.1 %
GFR AFRICAN AMERICAN: 12
GFR NON-AFRICAN AMERICAN: 10
GLUCOSE BLD-MCNC: 107 MG/DL (ref 70–99)
GLUCOSE BLD-MCNC: 109 MG/DL (ref 70–99)
HBV SURFACE AB TITR SER: <3.5 MIU/ML
HCT VFR BLD CALC: 29.1 % (ref 36–48)
HEMOGLOBIN: 9.6 G/DL (ref 12–16)
HEPATITIS B SURFACE ANTIGEN INTERPRETATION: NORMAL
INR BLD: 1.42 (ref 0.86–1.14)
LYMPHOCYTES ABSOLUTE: 1.3 K/UL (ref 1–5.1)
LYMPHOCYTES RELATIVE PERCENT: 17.3 %
MCH RBC QN AUTO: 30.3 PG (ref 26–34)
MCHC RBC AUTO-ENTMCNC: 32.9 G/DL (ref 31–36)
MCV RBC AUTO: 92.3 FL (ref 80–100)
MONOCYTES ABSOLUTE: 0.7 K/UL (ref 0–1.3)
MONOCYTES RELATIVE PERCENT: 8.9 %
NEUTROPHILS ABSOLUTE: 5.2 K/UL (ref 1.7–7.7)
NEUTROPHILS RELATIVE PERCENT: 70 %
PDW BLD-RTO: 15.9 % (ref 12.4–15.4)
PERFORMED ON: ABNORMAL
PHOSPHORUS: 5.5 MG/DL (ref 2.5–4.9)
PLATELET # BLD: 169 K/UL (ref 135–450)
PMV BLD AUTO: 7.4 FL (ref 5–10.5)
POTASSIUM SERPL-SCNC: 3.5 MMOL/L (ref 3.5–5.1)
PROTHROMBIN TIME: 16.5 SEC (ref 10–13.2)
RBC # BLD: 3.15 M/UL (ref 4–5.2)
SODIUM BLD-SCNC: 141 MMOL/L (ref 136–145)
WBC # BLD: 7.4 K/UL (ref 4–11)

## 2020-07-24 PROCEDURE — 6360000002 HC RX W HCPCS: Performed by: INTERNAL MEDICINE

## 2020-07-24 PROCEDURE — 6370000000 HC RX 637 (ALT 250 FOR IP): Performed by: INTERNAL MEDICINE

## 2020-07-24 PROCEDURE — 85610 PROTHROMBIN TIME: CPT

## 2020-07-24 PROCEDURE — C1752 CATH,HEMODIALYSIS,SHORT-TERM: HCPCS

## 2020-07-24 PROCEDURE — 87340 HEPATITIS B SURFACE AG IA: CPT

## 2020-07-24 PROCEDURE — 85730 THROMBOPLASTIN TIME PARTIAL: CPT

## 2020-07-24 PROCEDURE — 2580000003 HC RX 258

## 2020-07-24 PROCEDURE — 5A1D70Z PERFORMANCE OF URINARY FILTRATION, INTERMITTENT, LESS THAN 6 HOURS PER DAY: ICD-10-PCS | Performed by: INTERNAL MEDICINE

## 2020-07-24 PROCEDURE — 2580000003 HC RX 258: Performed by: INTERNAL MEDICINE

## 2020-07-24 PROCEDURE — 86706 HEP B SURFACE ANTIBODY: CPT

## 2020-07-24 PROCEDURE — 2500000003 HC RX 250 WO HCPCS: Performed by: INTERNAL MEDICINE

## 2020-07-24 PROCEDURE — C9113 INJ PANTOPRAZOLE SODIUM, VIA: HCPCS | Performed by: INTERNAL MEDICINE

## 2020-07-24 PROCEDURE — 36556 INSERT NON-TUNNEL CV CATH: CPT

## 2020-07-24 PROCEDURE — 90935 HEMODIALYSIS ONE EVALUATION: CPT

## 2020-07-24 PROCEDURE — 02HV33Z INSERTION OF INFUSION DEVICE INTO SUPERIOR VENA CAVA, PERCUTANEOUS APPROACH: ICD-10-PCS | Performed by: RADIOLOGY

## 2020-07-24 PROCEDURE — 85025 COMPLETE CBC W/AUTO DIFF WBC: CPT

## 2020-07-24 PROCEDURE — 36415 COLL VENOUS BLD VENIPUNCTURE: CPT

## 2020-07-24 PROCEDURE — 86704 HEP B CORE ANTIBODY TOTAL: CPT

## 2020-07-24 PROCEDURE — 80069 RENAL FUNCTION PANEL: CPT

## 2020-07-24 PROCEDURE — 77001 FLUOROGUIDE FOR VEIN DEVICE: CPT

## 2020-07-24 PROCEDURE — 76937 US GUIDE VASCULAR ACCESS: CPT

## 2020-07-24 PROCEDURE — 99232 SBSQ HOSP IP/OBS MODERATE 35: CPT | Performed by: PHYSICIAN ASSISTANT

## 2020-07-24 PROCEDURE — 2060000000 HC ICU INTERMEDIATE R&B

## 2020-07-24 RX ORDER — SODIUM CHLORIDE 9 MG/ML
INJECTION, SOLUTION INTRAVENOUS
Status: DISPENSED
Start: 2020-07-24 | End: 2020-07-24

## 2020-07-24 RX ORDER — HEPARIN SODIUM 1000 [USP'U]/ML
1600 INJECTION, SOLUTION INTRAVENOUS; SUBCUTANEOUS PRN
Status: DISCONTINUED | OUTPATIENT
Start: 2020-07-24 | End: 2020-07-24

## 2020-07-24 RX ORDER — HEPARIN SODIUM 1000 [USP'U]/ML
2600 INJECTION, SOLUTION INTRAVENOUS; SUBCUTANEOUS PRN
Status: DISCONTINUED | OUTPATIENT
Start: 2020-07-24 | End: 2020-07-28 | Stop reason: HOSPADM

## 2020-07-24 RX ORDER — SODIUM CHLORIDE 9 MG/ML
INJECTION, SOLUTION INTRAVENOUS
Status: COMPLETED
Start: 2020-07-24 | End: 2020-07-24

## 2020-07-24 RX ADMIN — SODIUM BICARBONATE 650 MG: 650 TABLET ORAL at 19:59

## 2020-07-24 RX ADMIN — RALOXIFENE HYDROCHLORIDE 60 MG: 60 TABLET, FILM COATED ORAL at 09:55

## 2020-07-24 RX ADMIN — SODIUM BICARBONATE 650 MG: 650 TABLET ORAL at 12:56

## 2020-07-24 RX ADMIN — CEFTRIAXONE SODIUM 1 G: 1 INJECTION, POWDER, FOR SOLUTION INTRAMUSCULAR; INTRAVENOUS at 17:45

## 2020-07-24 RX ADMIN — METRONIDAZOLE 500 MG: 500 INJECTION, SOLUTION INTRAVENOUS at 16:41

## 2020-07-24 RX ADMIN — SODIUM BICARBONATE 650 MG: 650 TABLET ORAL at 16:41

## 2020-07-24 RX ADMIN — Medication 10 ML: at 09:43

## 2020-07-24 RX ADMIN — PRAVASTATIN SODIUM 40 MG: 40 TABLET ORAL at 19:59

## 2020-07-24 RX ADMIN — SERTRALINE 50 MG: 50 TABLET, FILM COATED ORAL at 09:43

## 2020-07-24 RX ADMIN — SODIUM CHLORIDE 250 ML: 9 INJECTION, SOLUTION INTRAVENOUS at 09:44

## 2020-07-24 RX ADMIN — PANTOPRAZOLE SODIUM 40 MG: 40 INJECTION, POWDER, FOR SOLUTION INTRAVENOUS at 19:59

## 2020-07-24 RX ADMIN — LEVETIRACETAM 500 MG: 500 TABLET ORAL at 19:58

## 2020-07-24 RX ADMIN — SODIUM BICARBONATE 650 MG: 650 TABLET ORAL at 09:43

## 2020-07-24 RX ADMIN — ISOSORBIDE MONONITRATE 30 MG: 30 TABLET ORAL at 09:43

## 2020-07-24 RX ADMIN — LEVETIRACETAM 500 MG: 500 TABLET ORAL at 09:43

## 2020-07-24 RX ADMIN — METRONIDAZOLE 500 MG: 500 INJECTION, SOLUTION INTRAVENOUS at 23:25

## 2020-07-24 RX ADMIN — AMLODIPINE BESYLATE 5 MG: 5 TABLET ORAL at 09:43

## 2020-07-24 RX ADMIN — METRONIDAZOLE 500 MG: 500 INJECTION, SOLUTION INTRAVENOUS at 00:25

## 2020-07-24 RX ADMIN — Medication 10 ML: at 19:59

## 2020-07-24 RX ADMIN — METRONIDAZOLE 500 MG: 500 INJECTION, SOLUTION INTRAVENOUS at 09:43

## 2020-07-24 RX ADMIN — HEPARIN SODIUM 2600 UNITS: 1000 INJECTION INTRAVENOUS; SUBCUTANEOUS at 16:17

## 2020-07-24 RX ADMIN — PANTOPRAZOLE SODIUM 40 MG: 40 INJECTION, POWDER, FOR SOLUTION INTRAVENOUS at 09:43

## 2020-07-24 ASSESSMENT — PAIN SCALES - GENERAL
PAINLEVEL_OUTOF10: 0
PAINLEVEL_OUTOF10: 0

## 2020-07-24 NOTE — CARE COORDINATION
INTERDISCIPLINARY PLAN OF CARE CONFERENCE    Date/Time: 7/24/2020 2:23 PM  Completed by: Fede Renee Case Management      Patient Name:  Rafaela Bustamante  YOB: 1950  Admitting Diagnosis: Acute kidney injury [facility]) [N17.9]     Admit Date/Time:  7/17/2020  6:13 PM    Chart reviewed. Interdisciplinary team met to discuss patient progress and discharge plans. Disciplines included Case Management, Nursing, and Dietitian. Current Status:inpt    PT/OT recommendation:tbd    Anticipated Discharge Date: tbd  Expected D/C Disposition:  Nursing Home  Confirmed plan with patient and/or family Yes  Discharge Plan Comments: Reviewed chart. Pt from [facility] and plan return,+bed hold. Will need rapid C-19 to return.  follow      The Plan for Transition of Care is related to the following treatment goals: LTC      Home O2 in place on admit: to LTC  Pt informed of need to bring portable home O2 tank on day of discharge for nursing to connect prior to leaving:  Not Indicated  Verbalized agreement/Understanding:  Not Indicated

## 2020-07-24 NOTE — PROGRESS NOTES
PROGRESS NOTE  S:70 yrs Patient  admitted on 7/17/2020 with Acute kidney injury ([facility] [address] [N17.9] . Today she complains of Negative and tolerating diet. Exam:   Vitals:    07/24/20 0938   BP: 132/80   Pulse: 100   Resp: 16   Temp: 97.2 °F (36.2 °C)   SpO2: 95%      General appearance: alert, appears stated age, cooperative and no distress  HEENT: Oropharynx clear, no lesions  Neck: no adenopathy and supple, symmetrical, trachea midline  Lungs: clear to auscultation bilaterally  Heart: regular rate and rhythm, S1, S2 normal, no murmur, click, rub or gallop  Abdomen: soft, non-tender; bowel sounds normal; no masses,  no organomegaly  Extremities: extremities normal, atraumatic, no cyanosis or edema     Medications: Reviewed    Labs:  CBC:   Recent Labs     07/22/20  0614 07/23/20  0559 07/24/20  0457   WBC 9.5 7.6 7.4   HGB 9.9*  9.9* 9.1* 9.6*   HCT 30.0*  30.0* 28.0* 29.1*   MCV 94.4 93.0 92.3    178 169     BMP:   Recent Labs     07/22/20  0614 07/23/20  0559 07/24/20  0457   * 139 141   K 3.7 3.0* 3.5    106 107   CO2 13* 17* 19*   PHOS 6.1* 6.1* 5.5*   BUN 70* 66* 67*   CREATININE 4.1* 4.2* 4.5*     Attending Supervising [de-identified] Attestation Statement  The patient is a 72 y.o. female. I have performed a history and physical examination of the patient. I discussed the case with my physician assistant Zachary Lim PA-C    I reviewed the patient's Past Medical History, Past Surgical History, Medications, and Allergies.      Physical Exam:  Vitals:    07/23/20 2205 07/24/20 0142 07/24/20 0938 07/24/20 1400   BP: 129/85 134/84 132/80 116/71   Pulse: 99 99 100 99   Resp: 16 16 16 18   Temp: 98.5 °F (36.9 °C) 98.1 °F (36.7 °C) 97.2 °F (36.2 °C) 97.8 °F (36.6 °C)   TempSrc: Oral Oral Oral    SpO2: 98% 97% 95%    Weight:  106 lb 9.6 oz (48.4 kg)  106 lb 0.7 oz (48.1 kg)       Physical Examination: General appearance - alert, well appearing, and in no distress  Mental status - alert, oriented to person, place, and time  Eyes - pupils equal and reactive, extraocular eye movements intact  Neck - supple, no significant adenopathy  Chest - clear to auscultation, no wheezes, rales or rhonchi, symmetric air entry  Heart - normal rate, regular rhythm, normal S1, S2, no murmurs, rubs, clicks or gallops  Abdomen - soft, nontender, nondistended, no masses or organomegaly  Extremities - no pedal edema noted          Impression: 72year old female with history of hypothyroidism, DVT, cardiac pacemaker, HLD, seizure do, osteomyelitis, depression, and dementia admitted with anemia and proctitis. EGD and colonoscopy showed mild gastritis and diffuse colitis. Biopsies negative for inflammation, dysplasia, malignancy, H. Pylori, and celiac diease. Recommendation:  1. Continue supportive care  2. Continue PPI and Abx  3. Continue diet as tolerated  4. Ok to d/c from GI standpoint  5. F/u in clinic upon d/c      Dagoberto Berry PA-C  10:20 AM 7/24/2020                      6year old female with history of hypothyroidism, DVT, cardiac pacemaker, HLD, seizure do, osteomyelitis, depresssion and dementia admitted with anemia. EGD and colonoscopy were negative. Advance diet as tolerated. Ok to d/c from [employer] standpoint. Repeat CBC in 4wks upon discharge. Will sign off. Call with questions.     Abigail Puckett MD          [phone]  [phone]

## 2020-07-24 NOTE — FLOWSHEET NOTE
07/24/20 1400 07/24/20 1602   Vital Signs   Temp 97.8 °F (36.6 °C) 97.7 °F (36.5 °C)   Pulse 99 100   Resp 18 18     Treatment time: 2 hours    Net UF: 543 ml    Pre weight: 48.1 kg (standing scale)  Post weight: 47.8 kg (standing scale)  EDW: TBD    Access used: RIJ HD NT cath  Access function: No problems    Medications or blood products given: None    Regular outpatient schedule: TBD    Summary of response to treatment: 1st HD tx. Hypotension last 30 minutes, SBP 80's, UF off, BP improved to 90's. 2nd tx planned for tomorrow. Copy of dialysis treatment record placed in chart, to be scanned into EMR. Report called to Marissa Ferguson RN.

## 2020-07-24 NOTE — PROGRESS NOTES
 levETIRAcetam  500 mg Oral BID    raloxifene  60 mg Oral Daily    isosorbide mononitrate  30 mg Oral Daily    sertraline  50 mg Oral Daily    pravastatin  40 mg Oral Nightly    pantoprazole  40 mg Intravenous BID       Continuous Infusions:   sodium chloride         PRN Meds:  sodium chloride flush, acetaminophen **OR** acetaminophen, promethazine **OR** ondansetron      Data:  CBC:   Recent Labs     07/22/20  0614 07/23/20  0559 07/24/20  0457   WBC 9.5 7.6 7.4   HGB 9.9*  9.9* 9.1* 9.6*   HCT 30.0*  30.0* 28.0* 29.1*   MCV 94.4 93.0 92.3    178 169     BMP:   Recent Labs     07/22/20  0614 07/23/20  0559 07/24/20  0457   * 139 141   K 3.7 3.0* 3.5    106 107   CO2 13* 17* 19*   PHOS 6.1* 6.1* 5.5*   BUN 70* 66* 67*   CREATININE 4.1* 4.2* 4.5*     Cultures: Results for Tracy Christiansen (MRN [id number]) as of 7/19/2020 11:10   Ref. Range 7/19/2020 03:10   White Blood Cells (WBC), Stool Unknown Negative. ..   C DIFF TOXIN/ANTIGEN Unknown Rpt   GI Bacterial Pathogens By PCR Unknown No Shigella spp/E. .. Cryptosporidium Ag Unknown Negative. ..   E Histolytica Ag Unknown Negative. .. Giardia Ag, Stl Unknown Negative. .. C.diff Toxin/Antigen Unknown Negative for Clos. .. Susceptibility     Proteus mirabilis (1)     Antibiotic  Interpretation  NAN  Status    ampicillin  Sensitive  <=8  mcg/mL     ceFAZolin  Sensitive  <=2  mcg/mL     cefepime  Sensitive  <=2  mcg/mL     cefTRIAXone  Sensitive  <=1  mcg/mL     cefuroxime  Sensitive  <=4  mcg/mL     ciprofloxacin  Resistant  >2  mcg/mL     ertapenem  Sensitive  <=0.5  mcg/mL     gentamicin  Sensitive  <=4  mcg/mL     meropenem  Sensitive  <=1  mcg/mL     nitrofurantoin  Resistant  64  mcg/mL     piperacillin-tazobactam  Sensitive  <=16  mcg/mL     trimethoprim-sulfamethoxazole  Sensitive  <=2/38  mcg/mL         COVID 19   Not detected.     RADIOLOGY:    IR NONTUNNELED VASCULAR CATHETER > 5 YEARS   Final Result   Successful ultrasound and fluoroscopy guided placement of a temporary   dialysis catheter. CT ABDOMEN PELVIS WO CONTRAST Additional Contrast? None   Final Result   Marked mural thickening of the rectum with perirectal fat stranding,   suggesting proctitis. That should be followed to resolution to ensure that   there is no underlying neoplasm. Small bilateral pleural effusions with minimal adjacent atelectasis. Assessment/Plan: JEN vs CKD  - Baseline labs not known  - Creatinine on admission 3.8-->3.7-->3.5-->3.7-->3.9--> 4.1 --> 4.2-->4.5  - Nephrology consultation  - non-anion gap metabolic acidosis-->S/P IVF with bicarb. - Minimal urine OP documented, increasing edema . IV fluids discontinued ton 7/21and  1 dose of Lasix given, no Improvement in renal function.  -Nephrology following. Hold IVF and lasix and monitor. On PO Na bicarb now   -Munroe in place  -Continue strict I&Os,daily weights   - Poor UOP, s/p vas cath placement today, plans for HD today and tomorrow per nephrology     Hypokalemia   - replete    UTI  Acute cystitis without hematuria  - Urine cultures growing Proteus  - Already on Rocephin. COVID-19 ruled out   - since she came from a nursing home  - COVID-19 TESTING negative     Proctitis  - Start on empiric Rocephin and Flagyl -> cont D#5   - GI consultation has been obtained    GI bleed  Acute blood loss anemia  -Her H&H was low-->guaiac positive. - s/p one unit of PRBC transfusion  - H and H stable now. -Status post endoscopic procedure today 7/22  -EGD showed gastritis- biopsied   -Colonoscopy showed -mild colitis -biopsy sent     Seizure DO  - Continue Keppra     Hypothyroidism   - Continue Synthroid    HTN  - BP is uncontrolled   - Continue Norvasc     Has Pacemaker  - monitor in tele. - interrogate the PM, [employer]    #SCD for DVT prophylaxis. #DIET RENAL; Low Fiber  # [facility] course and plan of care discussed with Dr. Nitin Verde.        JUAN Byrnes 7/24/2020

## 2020-07-24 NOTE — PLAN OF CARE
Problem: Falls - Risk of:  Goal: Will remain free from falls  Description: Will remain free from falls  Outcome: Ongoing  Goal: Absence of physical injury  Description: Absence of physical injury  Outcome: Ongoing     Problem: Infection:  Goal: Will remain free from infection  Description: Will remain free from infection  Outcome: Ongoing     Problem: Safety:  Goal: Free from accidental physical injury  Description: Free from accidental physical injury  Outcome: Ongoing  Goal: Free from intentional harm  Description: Free from intentional harm  Outcome: Ongoing     Problem: Daily Care:  Goal: Daily care needs are met  Description: Daily care needs are met  Outcome: Ongoing     Problem: Pain:  Goal: Patient's pain/discomfort is manageable  Description: Patient's pain/discomfort is manageable  Outcome: Ongoing     Problem: Skin Integrity:  Goal: Skin integrity will stabilize  Description: Skin integrity will stabilize  Outcome: Ongoing     Problem: Discharge Planning:  Goal: Patients continuum of care needs are met  Description: Patients continuum of care needs are met  Outcome: Ongoing     Problem: Skin Integrity:  Goal: Will show no infection signs and symptoms  Description: Will show no infection signs and symptoms  Outcome: Ongoing  Goal: Absence of new skin breakdown  Description: Absence of new skin breakdown  Outcome: Ongoing

## 2020-07-24 NOTE — FLOWSHEET NOTE
07/23/20 2205   Vital Signs   Temp 98.5 °F (36.9 °C)   Temp Source Oral   Pulse 99   Heart Rate Source Monitor   Resp 16   /85   BP Location Left Arm   BP Upper/Lower Upper   Patient Position Semi fowlers   Level of Consciousness 0   MEWS Score 1   Patient Currently in Pain Denies   Oxygen Therapy   SpO2 98 %   Pulse Oximeter Device Mode Intermittent   Pulse Oximeter Device Location Finger   O2 Device None (Room air)   Shift assessment completed, see flow sheet. No c/o pain and appears to be without any distress. In bed with call light in reach.

## 2020-07-24 NOTE — PROGRESS NOTES
given little signs of renal improvement - plan for today and tomorrow  - Radiology consult for vascath placement - appreciate assistance in advance  - Started amlodipine 5 mg daily for BP control  - Started oral sodium bicarbonate replacement 650 mg po four times a day  - Trend labs, bp's, prn K replacement    Case d/w sister

## 2020-07-25 LAB
ALBUMIN SERPL-MCNC: 1.9 G/DL (ref 3.4–5)
ANION GAP SERPL CALCULATED.3IONS-SCNC: 11 MMOL/L (ref 3–16)
BASOPHILS ABSOLUTE: 0.1 K/UL (ref 0–0.2)
BASOPHILS RELATIVE PERCENT: 1.1 %
BUN BLDV-MCNC: 36 MG/DL (ref 7–20)
CALCIUM SERPL-MCNC: 7.5 MG/DL (ref 8.3–10.6)
CHLORIDE BLD-SCNC: 106 MMOL/L (ref 99–110)
CO2: 25 MMOL/L (ref 21–32)
CREAT SERPL-MCNC: 3.2 MG/DL (ref 0.6–1.2)
EOSINOPHILS ABSOLUTE: 0.2 K/UL (ref 0–0.6)
EOSINOPHILS RELATIVE PERCENT: 2.7 %
GFR AFRICAN AMERICAN: 17
GFR NON-AFRICAN AMERICAN: 14
GLUCOSE BLD-MCNC: 97 MG/DL (ref 70–99)
HCT VFR BLD CALC: 26.4 % (ref 36–48)
HEMOGLOBIN: 8.6 G/DL (ref 12–16)
LYMPHOCYTES ABSOLUTE: 1.2 K/UL (ref 1–5.1)
LYMPHOCYTES RELATIVE PERCENT: 18.5 %
MCH RBC QN AUTO: 29.9 PG (ref 26–34)
MCHC RBC AUTO-ENTMCNC: 32.7 G/DL (ref 31–36)
MCV RBC AUTO: 91.6 FL (ref 80–100)
MONOCYTES ABSOLUTE: 0.6 K/UL (ref 0–1.3)
MONOCYTES RELATIVE PERCENT: 9.2 %
NEUTROPHILS ABSOLUTE: 4.5 K/UL (ref 1.7–7.7)
NEUTROPHILS RELATIVE PERCENT: 68.5 %
PDW BLD-RTO: 15.9 % (ref 12.4–15.4)
PHOSPHORUS: 4.1 MG/DL (ref 2.5–4.9)
PLATELET # BLD: 144 K/UL (ref 135–450)
PMV BLD AUTO: 7.6 FL (ref 5–10.5)
POTASSIUM SERPL-SCNC: 3.4 MMOL/L (ref 3.5–5.1)
RBC # BLD: 2.89 M/UL (ref 4–5.2)
SODIUM BLD-SCNC: 142 MMOL/L (ref 136–145)
WBC # BLD: 6.5 K/UL (ref 4–11)

## 2020-07-25 PROCEDURE — 6360000002 HC RX W HCPCS: Performed by: INTERNAL MEDICINE

## 2020-07-25 PROCEDURE — 6370000000 HC RX 637 (ALT 250 FOR IP): Performed by: INTERNAL MEDICINE

## 2020-07-25 PROCEDURE — 5A1D70Z PERFORMANCE OF URINARY FILTRATION, INTERMITTENT, LESS THAN 6 HOURS PER DAY: ICD-10-PCS | Performed by: INTERNAL MEDICINE

## 2020-07-25 PROCEDURE — 36592 COLLECT BLOOD FROM PICC: CPT

## 2020-07-25 PROCEDURE — 2060000000 HC ICU INTERMEDIATE R&B

## 2020-07-25 PROCEDURE — 2500000003 HC RX 250 WO HCPCS: Performed by: INTERNAL MEDICINE

## 2020-07-25 PROCEDURE — P9047 ALBUMIN (HUMAN), 25%, 50ML: HCPCS | Performed by: INTERNAL MEDICINE

## 2020-07-25 PROCEDURE — 85025 COMPLETE CBC W/AUTO DIFF WBC: CPT

## 2020-07-25 PROCEDURE — C9113 INJ PANTOPRAZOLE SODIUM, VIA: HCPCS | Performed by: INTERNAL MEDICINE

## 2020-07-25 PROCEDURE — 90935 HEMODIALYSIS ONE EVALUATION: CPT

## 2020-07-25 PROCEDURE — 80069 RENAL FUNCTION PANEL: CPT

## 2020-07-25 PROCEDURE — 2580000003 HC RX 258: Performed by: INTERNAL MEDICINE

## 2020-07-25 RX ORDER — ALBUMIN (HUMAN) 12.5 G/50ML
12.5 SOLUTION INTRAVENOUS PRN
Status: DISCONTINUED | OUTPATIENT
Start: 2020-07-25 | End: 2020-07-28 | Stop reason: HOSPADM

## 2020-07-25 RX ORDER — MIDODRINE HYDROCHLORIDE 5 MG/1
5 TABLET ORAL PRN
Status: DISCONTINUED | OUTPATIENT
Start: 2020-07-25 | End: 2020-07-28 | Stop reason: HOSPADM

## 2020-07-25 RX ORDER — ALBUMIN (HUMAN) 12.5 G/50ML
25 SOLUTION INTRAVENOUS PRN
Status: DISCONTINUED | OUTPATIENT
Start: 2020-07-25 | End: 2020-07-28 | Stop reason: HOSPADM

## 2020-07-25 RX ORDER — SODIUM CHLORIDE 9 MG/ML
INJECTION, SOLUTION INTRAVENOUS
Status: DISPENSED
Start: 2020-07-25 | End: 2020-07-25

## 2020-07-25 RX ADMIN — PANTOPRAZOLE SODIUM 40 MG: 40 INJECTION, POWDER, FOR SOLUTION INTRAVENOUS at 19:58

## 2020-07-25 RX ADMIN — PRAVASTATIN SODIUM 40 MG: 40 TABLET ORAL at 19:58

## 2020-07-25 RX ADMIN — HEPARIN SODIUM 2600 UNITS: 1000 INJECTION INTRAVENOUS; SUBCUTANEOUS at 10:12

## 2020-07-25 RX ADMIN — SERTRALINE 50 MG: 50 TABLET, FILM COATED ORAL at 11:23

## 2020-07-25 RX ADMIN — ALBUMIN (HUMAN) 25 G: 0.25 INJECTION, SOLUTION INTRAVENOUS at 08:10

## 2020-07-25 RX ADMIN — Medication 10 ML: at 19:58

## 2020-07-25 RX ADMIN — CEFTRIAXONE SODIUM 1 G: 1 INJECTION, POWDER, FOR SOLUTION INTRAMUSCULAR; INTRAVENOUS at 17:22

## 2020-07-25 RX ADMIN — METRONIDAZOLE 500 MG: 500 INJECTION, SOLUTION INTRAVENOUS at 15:32

## 2020-07-25 RX ADMIN — LEVETIRACETAM 500 MG: 500 TABLET ORAL at 11:23

## 2020-07-25 RX ADMIN — Medication 10 ML: at 11:24

## 2020-07-25 RX ADMIN — AMLODIPINE BESYLATE 5 MG: 5 TABLET ORAL at 11:24

## 2020-07-25 RX ADMIN — RALOXIFENE HYDROCHLORIDE 60 MG: 60 TABLET, FILM COATED ORAL at 11:23

## 2020-07-25 RX ADMIN — PANTOPRAZOLE SODIUM 40 MG: 40 INJECTION, POWDER, FOR SOLUTION INTRAVENOUS at 11:24

## 2020-07-25 RX ADMIN — ISOSORBIDE MONONITRATE 30 MG: 30 TABLET ORAL at 11:23

## 2020-07-25 RX ADMIN — LEVETIRACETAM 500 MG: 500 TABLET ORAL at 19:58

## 2020-07-25 RX ADMIN — MIDODRINE HYDROCHLORIDE 5 MG: 5 TABLET ORAL at 09:05

## 2020-07-25 ASSESSMENT — PAIN SCALES - GENERAL
PAINLEVEL_OUTOF10: 0
PAINLEVEL_OUTOF10: 0

## 2020-07-25 NOTE — PROGRESS NOTES
Morning labs drawn at this time. Pt up to [facility], tolerated well. No other needs expressed. Call light in reach.  Will monitor Jackie Hughes

## 2020-07-25 NOTE — PROGRESS NOTES
Pt in bed, awake, A/O X3. Shift assessment complete, night meds given. No C/o pain at this time. . No other needs expressed. Call light in reach. Bed check in place. Will monitor.  Gianna Mercedes

## 2020-07-25 NOTE — PLAN OF CARE
Nutrition Problem #1: Increased nutrient needs, In context of chronic illness  Intervention: Food and/or Nutrient Delivery: Continue Current Diet, Start Oral Nutrition Supplement  Nutritional Goals: PO intake > 75% of meals and supplement. Wt maintenance during admission if clinically possible, maitnain appropriate hydration.

## 2020-07-25 NOTE — PROGRESS NOTES
Patient is resting showing no s/s of distress. Patient denies any needs. Will continue to monitor. Bed is in lowest position and call light is within reach. Shift assessment complete, see flowsheets.

## 2020-07-25 NOTE — PROGRESS NOTES
[facility]       Progress Note           Subjective/   72y.o. year old female who we are seeing in consultation for JEN. HPI:  Renal function worsening despite IVF's, now off, prn lasix on 7/21 & 7/23. Had first HD on 7/24 with 543 ml, limited due to hypotension, post-weight of 47.8 kg. Seen on dialysis. Orders confirmed. Pre-weight at HD today 47.9 kg. Urine output of 355 ml over last 24 hours. Intake improving per patient. Remains edematous. ROS:  Denies any sob, abdominal pain. +weak. Objective/   GEN:  Chronically ill, /86   Pulse 102   Temp 97.8 °F (36.6 °C)   Resp 16   Wt 105 lb 9.6 oz (47.9 kg)   SpO2 96%   HEENT: non-icteric, no JVD  CV: S1, S2 without m/r/g; ++ LE edema  RESP: CTA B without w/r/r; breathing wnl  ABD: +bs, soft, nt, no hsm  SKIN: warm, no rashes  ACCESS: R IJ vascath (7/24)    Data/  Recent Labs     07/23/20  0559 07/24/20  0457 07/25/20  0500   WBC 7.6 7.4 6.5   HGB 9.1* 9.6* 8.6*   HCT 28.0* 29.1* 26.4*   MCV 93.0 92.3 91.6    169 144     Recent Labs     07/23/20  0559 07/24/20  0457 07/25/20  0500    141 142   K 3.0* 3.5 3.4*    107 106   CO2 17* 19* 25   GLUCOSE 102* 109* 97   PHOS 6.1* 5.5* 4.1   BUN 66* 67* 36*   CREATININE 4.2* 4.5* 3.2*   LABGLOM 10* 10* 14*   GFRAA 13* 12* 17*       Assessment/     -JEN in the setting of hematochezia, no documented hypotension but patient likely has   hemodynamically mediated JEN with possibly ATN in setting of UTI.                Unclear what what her baseline renal function is, she had ATN back in December 2019 when SCr was 1.9, improved to 1.2 from Jan 2020, up to 2.6-2.9 from earlier from July 13-15, 2020   CT A/P with no obstruction, findings of proctitis on admission   Follows with Dr. Theo Boogie in office     -Proteus mirabilis UTI   On ceftriaxone     -Severe metabolic acidosis likely related to JEN and unclear if she has diarrhea or not   Improved with IVF's, recurrent    -Acute blood loss anemia with hematochezia   Prn prbc's    -Proctitis    Plans per GI    -HTN   Better with addition of amlodipine    -Hypokalemia   Prn replacement       Plan/     - HD today with 1-2 L fluid removal target with crit line monitoring with prn albumin, midodrine  - Plan for HD again on Monday  - Start epogen with next HD, check anemia indices  - Stop oral sodium bicarbonate replacement now that on dialysis  - Trend labs, bp's

## 2020-07-25 NOTE — PROGRESS NOTES
Pt in bed, eyes closed. No distress noted. Call light in reach. Will continue to monitor.   Lois Tarango

## 2020-07-25 NOTE — PROGRESS NOTES
mononitrate  30 mg Oral Daily    sertraline  50 mg Oral Daily    pravastatin  40 mg Oral Nightly    pantoprazole  40 mg Intravenous BID       Continuous Infusions:   sodium chloride         PRN Meds:  albumin human, albumin human, midodrine, heparin (porcine), sodium chloride flush, acetaminophen **OR** acetaminophen, promethazine **OR** ondansetron      Data:  CBC:   Recent Labs     07/23/20  0559 07/24/20  0457 07/25/20  0500   WBC 7.6 7.4 6.5   HGB 9.1* 9.6* 8.6*   HCT 28.0* 29.1* 26.4*   MCV 93.0 92.3 91.6    169 144     BMP:   Recent Labs     07/23/20  0559 07/24/20  0457 07/25/20  0500    141 142   K 3.0* 3.5 3.4*    107 106   CO2 17* 19* 25   PHOS 6.1* 5.5* 4.1   BUN 66* 67* 36*   CREATININE 4.2* 4.5* 3.2*     Cultures: Results for GRAY JUNIOR  C.diff Toxin/Antigen Unknown Negative for Clos. .. Susceptibility     Proteus mirabilis (1)     Antibiotic  Interpretation  NAN  Status    ampicillin  Sensitive  <=8  mcg/mL     ceFAZolin  Sensitive  <=2  mcg/mL     cefepime  Sensitive  <=2  mcg/mL     cefTRIAXone  Sensitive  <=1  mcg/mL     cefuroxime  Sensitive  <=4  mcg/mL     ciprofloxacin  Resistant  >2  mcg/mL         RADIOLOGY:    IR NONTUNNELED VASCULAR CATHETER > 5 YEARS   Final Result   Successful ultrasound and fluoroscopy guided placement of a temporary   dialysis catheter. CT ABDOMEN PELVIS WO CONTRAST Additional Contrast? None   Final Result   Marked mural thickening of the rectum with perirectal fat stranding,   suggesting proctitis. That should be followed to resolution to ensure that   there is no underlying neoplasm. Small bilateral pleural effusions with minimal adjacent atelectasis. Assessment/Plan: JEN vs CKD  Pt had been startedon HD  -Continue strict I&Os,daily weights       UTI  Acute cystitis without hematuria  - Urine cultures growing Proteus  -  on Rocephin.       Proctitis  - Start on empiric Rocephin and Flagyl -> cont D#6  GI bleed  Acute blood loss anemia  - s/p one unit of PRBC transfusion   -Status post endoscopic procedure  7/22  -EGD showed gastritis- biopsied   -Colonoscopy showed -mild colitis -biopsy sent     Seizure DO  - Continue Keppra     Hypothyroidism   - Continue Synthroid    HTN  - BP is  Lower withHD  Dc  norvas for now       Has Pacemaker  - monitor in tele. - interrogate the PM, [employer]      #SCD for DVT prophylaxis.   #DIET RENAL; Low Fiber  # DNR-CC       Pam Duggan MD    7/25/2020

## 2020-07-25 NOTE — FLOWSHEET NOTE
Treatment time: 2 hrs    Net UF: 1.6 liters    Pre weight: 47.9kg standing scale  Post weight: 46.3kg  EDW: TBD    Access used: Ri IJ Temp Vascath  Access function: Positional    Medications or blood products given: Albumin 25gm and Midodrine 5mg for bp support    Regular outpatient schedule: TBD    Summary of response to treatment:      07/25/20 1024   Vital Signs   /78   Temp 98.5 °F (36.9 °C)   Pulse 98   Resp 16   Weight 102 lb 1.2 oz (46.3 kg)   Weight Method Standing scale   Percent Weight Change -3.34   Pain Assessment   Pain Assessment 0-10   Pain Level 0   Post-Hemodialysis Assessment   Post-Treatment Procedures Blood returned;Catheter capped, clamped and heparinized x 2 ports   Machine Disinfection Process Acid/Vinegar Clean;Heat Disinfect; Exterior Machine Disinfection   Rinseback Volume (ml) 400 ml   Total Liters Processed (l/min) 28.1 l/min   Dialyzer Clearance Lightly streaked   Duration of Treatment (minutes) 120 minutes   Heparin amount administered during treatment (units) 0 units   Hemodialysis Intake (ml) 500 ml   Hemodialysis Output (ml) 2110 ml   NET Removed (ml) 1610 ml   Tolerated Treatment Good   Patient Response to Treatment No c/o's voiced. Albumin 25gm and midodrine 5mg used for bp support. Pt alert. Vascath positional.    Bilateral Breath Sounds Diminished   RLE Edema +3   LLE Edema +3   Physician Notified? Yes   Copy of dialysis treatment record placed in chart, to be scanned into EMR. 2  Report given to [facility].

## 2020-07-25 NOTE — PROGRESS NOTES
Comprehensive Nutrition Assessment    Type and Reason for Visit:  Initial(LOS)    Nutrition Recommendations/Plan:   1. Continue renal, low fiber diet as tolerated  2. Add renal ONS (Nepro) po once daily. 3. Please continue to document %PO intake in EMR. 4. Will continue to monitor nutrition adequacy, wt, pertinent labs, bowel function and F/U PRN. Nutrition Assessment:  Pt at nutritional risk r/t abdominal pain, renal dysfunction. Currently with variable PO intake, s/p EGD/colonoscopy with gastritis and mild colitis 7/22, started HD 7/24. Remains at nutritional risk r/t renal dysfunction, need for HD, GI abnormality, edema, low BMI. Will add renal ONS, continue renal low fiber diet. Malnutrition Assessment:  Malnutrition Status: At risk for malnutrition (Comment)(Suspect higher level of malnutrition, + edema, low BMI for advanced age, + HD, unable to perform NFPE at time of visit d/t Res resting)    Context:  Chronic Illness     Findings of the 6 clinical characteristics of malnutrition:  Energy Intake:  Unable to assess  Weight Loss:  No significant weight loss     Body Fat Loss:  Unable to assess     Muscle Mass Loss:  Unable to assess    Fluid Accumulation:  7 - Severe Extremities   Strength:  Not Performed    Estimated Daily Nutrient Needs:  Energy (kcal):  1392-1624kcal (30-35kcal/kg); Weight Used for Energy Requirements:  Current     Protein (g):  55-60g (1.2-1.3g/kg/CBW); Weight Used for Protein Requirements:  Current        Fluid (ml/day):  per MD guidance; Weight Used for Fluid Requirements:  Current      Nutrition Related Findings:  Pt admitted with abdominal pain. Variable PO intake %, s/p EGD/colonoscopy 7/22 with gastritis, mild colitis. Abdomen soft, + BS x 4, last BM 7/25. Noted BUE non-pitting edema, BLE +3 pitting edema, trace edema to perineum. Abnormal renal labs, hypoalbumenemia. HD started 7/24, again on 7/25, per MD to receive epogen next HD.   Post HD wt 7/25 46.3kg    Wounds:  None       Current Nutrition Therapies:    DIET RENAL; Low Fiber  Dietary Nutrition Supplements: Renal Oral Supplement    Anthropometric Measures:  · Height: 4' 8.5\" (143.5 cm)  · Current Body Weight: 102 lb 1 oz (46.3 kg)   · Admission Body Weight: 98 lb (44.5 kg)    · Usual Body Weight: (UBW # per wt hx; wt gain x 15% x 6mo (sig))  Desirable, anticipate wt fluctuations 2/2 fluid shifts  · Ideal Body Weight: 83 lbs; % Ideal Body Weight 123 %   · BMI: 22.5  · Adjusted Body Weight No Adjustment   · BMI Categories: Normal Weight (BMI 22.0 to 24.9) age over 72       Nutrition Diagnosis:   · Increased nutrient needs, In context of chronic illness related to cognitive or neurological impairment, renal dysfunction, altered GI function as evidenced by dialysis, localized or generalized fluid accumulation, lab values, GI abnormality, other (comment)(variable PO intake)      Nutrition Interventions:   Food and/or Nutrient Delivery:  Continue Current Diet, Start Oral Nutrition Supplement  Nutrition Education/Counseling:  No recommendation at this time   Coordination of Nutrition Care:  Continued Inpatient Monitoring, Coordination of Community Care    Goals:  PO intake > 75% of meals and supplement. Wt maintenance during admission if clinically possible, maitnain appropriate hydration. Nutrition Monitoring and Evaluation:   Behavioral-Environmental Outcomes:  Knowledge or Skill   Food/Nutrient Intake Outcomes:  Food and Nutrient Intake, Supplement Intake  Physical Signs/Symptoms Outcomes:  Biochemical Data, GI Status, Nutrition Focused Physical Findings, Skin, Weight     Discharge Planning:     Too soon to determine     Electronically signed by La [place] RD, LD on 7/25/20 at 6:34 PM EDT    Contact: [phone]

## 2020-07-26 LAB
ALBUMIN SERPL-MCNC: 2.4 G/DL (ref 3.4–5)
ANION GAP SERPL CALCULATED.3IONS-SCNC: 6 MMOL/L (ref 3–16)
BASOPHILS ABSOLUTE: 0.1 K/UL (ref 0–0.2)
BASOPHILS RELATIVE PERCENT: 1.2 %
BUN BLDV-MCNC: 20 MG/DL (ref 7–20)
CALCIUM SERPL-MCNC: 7.9 MG/DL (ref 8.3–10.6)
CHLORIDE BLD-SCNC: 103 MMOL/L (ref 99–110)
CO2: 28 MMOL/L (ref 21–32)
CREAT SERPL-MCNC: 2.5 MG/DL (ref 0.6–1.2)
EOSINOPHILS ABSOLUTE: 0.2 K/UL (ref 0–0.6)
EOSINOPHILS RELATIVE PERCENT: 2.5 %
FERRITIN: 2815 NG/ML (ref 15–150)
FOLATE: 5.74 NG/ML (ref 4.78–24.2)
GFR AFRICAN AMERICAN: 23
GFR NON-AFRICAN AMERICAN: 19
GLUCOSE BLD-MCNC: 100 MG/DL (ref 70–99)
HCT VFR BLD CALC: 24.9 % (ref 36–48)
HEMOGLOBIN: 8.2 G/DL (ref 12–16)
HEPATITIS B CORE TOTAL ANTIBODY: NEGATIVE
IRON SATURATION: NORMAL % (ref 15–50)
IRON: 48 UG/DL (ref 37–145)
LYMPHOCYTES ABSOLUTE: 1.4 K/UL (ref 1–5.1)
LYMPHOCYTES RELATIVE PERCENT: 21.6 %
MCH RBC QN AUTO: 30.8 PG (ref 26–34)
MCHC RBC AUTO-ENTMCNC: 33 G/DL (ref 31–36)
MCV RBC AUTO: 93.5 FL (ref 80–100)
MONOCYTES ABSOLUTE: 0.7 K/UL (ref 0–1.3)
MONOCYTES RELATIVE PERCENT: 10.4 %
NEUTROPHILS ABSOLUTE: 4.1 K/UL (ref 1.7–7.7)
NEUTROPHILS RELATIVE PERCENT: 64.3 %
PDW BLD-RTO: 15.5 % (ref 12.4–15.4)
PHOSPHORUS: 3.2 MG/DL (ref 2.5–4.9)
PLATELET # BLD: 145 K/UL (ref 135–450)
PMV BLD AUTO: 7.7 FL (ref 5–10.5)
POTASSIUM SERPL-SCNC: 3.4 MMOL/L (ref 3.5–5.1)
RBC # BLD: 2.67 M/UL (ref 4–5.2)
SODIUM BLD-SCNC: 137 MMOL/L (ref 136–145)
TOTAL IRON BINDING CAPACITY: NORMAL UG/DL (ref 260–445)
VITAMIN B-12: 1230 PG/ML (ref 211–911)
WBC # BLD: 6.4 K/UL (ref 4–11)

## 2020-07-26 PROCEDURE — 82746 ASSAY OF FOLIC ACID SERUM: CPT

## 2020-07-26 PROCEDURE — 36592 COLLECT BLOOD FROM PICC: CPT

## 2020-07-26 PROCEDURE — 85025 COMPLETE CBC W/AUTO DIFF WBC: CPT

## 2020-07-26 PROCEDURE — 82607 VITAMIN B-12: CPT

## 2020-07-26 PROCEDURE — 82728 ASSAY OF FERRITIN: CPT

## 2020-07-26 PROCEDURE — 80069 RENAL FUNCTION PANEL: CPT

## 2020-07-26 PROCEDURE — 6360000002 HC RX W HCPCS: Performed by: INTERNAL MEDICINE

## 2020-07-26 PROCEDURE — 2580000003 HC RX 258: Performed by: INTERNAL MEDICINE

## 2020-07-26 PROCEDURE — 83550 IRON BINDING TEST: CPT

## 2020-07-26 PROCEDURE — 2500000003 HC RX 250 WO HCPCS: Performed by: INTERNAL MEDICINE

## 2020-07-26 PROCEDURE — C9113 INJ PANTOPRAZOLE SODIUM, VIA: HCPCS | Performed by: INTERNAL MEDICINE

## 2020-07-26 PROCEDURE — 83540 ASSAY OF IRON: CPT

## 2020-07-26 PROCEDURE — 2060000000 HC ICU INTERMEDIATE R&B

## 2020-07-26 PROCEDURE — 6370000000 HC RX 637 (ALT 250 FOR IP): Performed by: INTERNAL MEDICINE

## 2020-07-26 RX ADMIN — RALOXIFENE HYDROCHLORIDE 60 MG: 60 TABLET, FILM COATED ORAL at 08:40

## 2020-07-26 RX ADMIN — Medication 10 ML: at 20:39

## 2020-07-26 RX ADMIN — LEVETIRACETAM 500 MG: 500 TABLET ORAL at 08:40

## 2020-07-26 RX ADMIN — SERTRALINE 50 MG: 50 TABLET, FILM COATED ORAL at 08:40

## 2020-07-26 RX ADMIN — PRAVASTATIN SODIUM 40 MG: 40 TABLET ORAL at 20:39

## 2020-07-26 RX ADMIN — PANTOPRAZOLE SODIUM 40 MG: 40 INJECTION, POWDER, FOR SOLUTION INTRAVENOUS at 08:40

## 2020-07-26 RX ADMIN — ISOSORBIDE MONONITRATE 30 MG: 30 TABLET ORAL at 08:40

## 2020-07-26 RX ADMIN — PANTOPRAZOLE SODIUM 40 MG: 40 INJECTION, POWDER, FOR SOLUTION INTRAVENOUS at 20:39

## 2020-07-26 RX ADMIN — METRONIDAZOLE 500 MG: 500 INJECTION, SOLUTION INTRAVENOUS at 08:40

## 2020-07-26 RX ADMIN — METRONIDAZOLE 500 MG: 500 INJECTION, SOLUTION INTRAVENOUS at 00:13

## 2020-07-26 RX ADMIN — ONDANSETRON HYDROCHLORIDE 4 MG: 2 INJECTION, SOLUTION INTRAMUSCULAR; INTRAVENOUS at 03:26

## 2020-07-26 RX ADMIN — Medication 10 ML: at 08:40

## 2020-07-26 RX ADMIN — LEVETIRACETAM 500 MG: 500 TABLET ORAL at 20:39

## 2020-07-26 NOTE — PROGRESS NOTES
acetaminophen **OR** acetaminophen, promethazine **OR** ondansetron      Data:  CBC:   Recent Labs     07/24/20  0457 07/25/20  0500 07/26/20  0400   WBC 7.4 6.5 6.4   HGB 9.6* 8.6* 8.2*   HCT 29.1* 26.4* 24.9*   MCV 92.3 91.6 93.5    144 145     BMP:   Recent Labs     07/24/20  0457 07/25/20  0500 07/26/20  0400    142 137   K 3.5 3.4* 3.4*    106 103   CO2 19* 25 28   PHOS 5.5* 4.1 3.2   BUN 67* 36* 20   CREATININE 4.5* 3.2* 2.5*       RADIOLOGY:    IR NONTUNNELED VASCULAR CATHETER > 5 YEARS   Final Result   Successful ultrasound and fluoroscopy guided placement of a temporary   dialysis catheter. CT ABDOMEN PELVIS WO CONTRAST Additional Contrast? None   Final Result   Marked mural thickening of the rectum with perirectal fat stranding,   suggesting proctitis. That should be followed to resolution to ensure that   there is no underlying neoplasm. Small bilateral pleural effusions with minimal adjacent atelectasis. Assessment/Plan: JEN vs CKD  Pt had been startedon HD  -HD tomorrow again  Has diez still poor uop           UTI proteus\  -  on Rocephin. D7    Proctitis  - Start on empiric Rocephin and Flagyl -> cont D#7/7    GI bleed  Acute blood loss anemia  - s/p one unit of PRBC transfusion   -Status post endoscopic procedure  7/22  -EGD showed gastritis- biopsied   -Colonoscopy showed -mild colitis -biopsy sent   Starting on epogen per nephro    Seizure DO  - Continue Keppra     HTN  - BP is  Lower withHD  Dc  norvasc for now   promaitine prn      Has Pacemaker  - monitor in tele. - interrogate the PM, Biotronik      #SCD for DVT prophylaxis.     #DIET RENAL; Low Fiber  Dietary Nutrition Supplements: Renal Oral Supplement  # DNR-CC  Need paul Tran MD    7/26/2020

## 2020-07-26 NOTE — PROGRESS NOTES
[facility]       Progress Note           Subjective/   72y.o. year old female who we are seeing in consultation for JEN. HPI:  Renal function worsening despite IVF's, now off, prn lasix on 7/21 & 7/23. Started HD since 7/24, last HD on 7/25 with 1.6 liters removed, post-weight of 46.3 kg. Intake improving per patient. Remains edematous. ROS:  Denies any sob, abdominal pain. +weak. Objective/   GEN:  Chronically ill, /83   Pulse 101   Temp 98.5 °F (36.9 °C) (Oral)   Resp 18   Ht 4' 8.5\" (1.435 m)   Wt 102 lb 11.2 oz (46.6 kg)   SpO2 96%   BMI 22.62 kg/m²   HEENT: non-icteric, no JVD  CV: S1, S2 without m/r/g; ++ LE edema  RESP: CTA B without w/r/r; breathing wnl  ABD: +bs, soft, nt, no hsm  SKIN: warm, no rashes  ACCESS: R IJ vascath (7/24)    Data/  Recent Labs     07/24/20  0457 07/25/20  0500 07/26/20  0400   WBC 7.4 6.5 6.4   HGB 9.6* 8.6* 8.2*   HCT 29.1* 26.4* 24.9*   MCV 92.3 91.6 93.5    144 145     Recent Labs     07/24/20  0457 07/25/20  0500 07/26/20  0400    142 137   K 3.5 3.4* 3.4*    106 103   CO2 19* 25 28   GLUCOSE 109* 97 100*   PHOS 5.5* 4.1 3.2   BUN 67* 36* 20   CREATININE 4.5* 3.2* 2.5*   LABGLOM 10* 14* 19*   GFRAA 12* 17* 23*       Assessment/     -JEN in the setting of hematochezia, no documented hypotension but patient likely has   hemodynamically mediated JEN with possibly ATN in setting of UTI.                Unclear what what her baseline renal function is, she had ATN back in December 2019 when SCr was 1.9, improved to 1.2 from Jan 2020, up to 2.6-2.9 from earlier from July 13-15, 2020   CT A/P with no obstruction, findings of proctitis on admission   Follows with Dr. Felice Hernandez in office     -Proteus mirabilis UTI   On ceftriaxone     -Severe metabolic acidosis likely related to JEN and unclear if she has diarrhea or not   Improved with IVF's, recurrent    -Acute blood loss anemia with hematochezia   Prn prbc's    -Proctitis    Plans per GI    -HTN   Better with addition of amlodipine    -Hypokalemia   Prn replacement       Plan/     - HD tomorrow with 1-2 L fluid removal target with crit line monitoring with prn albumin, midodrine  - Start epogen with next HD, checking anemia indices  - Trend labs, bp's

## 2020-07-26 NOTE — PROGRESS NOTES
Pt up to bsc, tolerated well, PRN zofran given at this time. no other needs expressed. Call light in reach. Bed check in place.  Will monitor  Rejeana Carry

## 2020-07-26 NOTE — PROGRESS NOTES
Pt in bed, eyes closed. No distress noted. Call light in reach. Will continue to monitor.   Clint Ron

## 2020-07-26 NOTE — PROGRESS NOTES
Patient is resting in bed showing no s/s of distress. Patient is refusing breakfast at this time. Patient denies any needs. Bed is in the lowest position and call light is within reach. Shift assessment complete, see flow sheets.

## 2020-07-27 LAB
ALBUMIN SERPL-MCNC: 2.2 G/DL (ref 3.4–5)
ANION GAP SERPL CALCULATED.3IONS-SCNC: 10 MMOL/L (ref 3–16)
ANISOCYTOSIS: ABNORMAL
BASOPHILS ABSOLUTE: 0.1 K/UL (ref 0–0.2)
BASOPHILS RELATIVE PERCENT: 1 %
BUN BLDV-MCNC: 23 MG/DL (ref 7–20)
CALCIUM SERPL-MCNC: 8 MG/DL (ref 8.3–10.6)
CHLORIDE BLD-SCNC: 109 MMOL/L (ref 99–110)
CO2: 25 MMOL/L (ref 21–32)
CREAT SERPL-MCNC: 3.4 MG/DL (ref 0.6–1.2)
EOSINOPHILS ABSOLUTE: 0.1 K/UL (ref 0–0.6)
EOSINOPHILS RELATIVE PERCENT: 2 %
GFR AFRICAN AMERICAN: 16
GFR NON-AFRICAN AMERICAN: 13
GLUCOSE BLD-MCNC: 86 MG/DL (ref 70–99)
HCT VFR BLD CALC: 23.6 % (ref 36–48)
HEMOGLOBIN: 7.8 G/DL (ref 12–16)
HYPOCHROMIA: ABNORMAL
LYMPHOCYTES ABSOLUTE: 0.9 K/UL (ref 1–5.1)
LYMPHOCYTES RELATIVE PERCENT: 13 %
MCH RBC QN AUTO: 30.9 PG (ref 26–34)
MCHC RBC AUTO-ENTMCNC: 33.2 G/DL (ref 31–36)
MCV RBC AUTO: 93.3 FL (ref 80–100)
MONOCYTES ABSOLUTE: 0.6 K/UL (ref 0–1.3)
MONOCYTES RELATIVE PERCENT: 8 %
NEUTROPHILS ABSOLUTE: 5.5 K/UL (ref 1.7–7.7)
NEUTROPHILS RELATIVE PERCENT: 76 %
PDW BLD-RTO: 15.6 % (ref 12.4–15.4)
PHOSPHORUS: 3.5 MG/DL (ref 2.5–4.9)
PLATELET # BLD: 141 K/UL (ref 135–450)
PLATELET SLIDE REVIEW: ADEQUATE
PMV BLD AUTO: 8 FL (ref 5–10.5)
POIKILOCYTES: ABNORMAL
POTASSIUM SERPL-SCNC: 3.2 MMOL/L (ref 3.5–5.1)
RBC # BLD: 2.52 M/UL (ref 4–5.2)
SLIDE REVIEW: ABNORMAL
SODIUM BLD-SCNC: 144 MMOL/L (ref 136–145)
WBC # BLD: 7.2 K/UL (ref 4–11)

## 2020-07-27 PROCEDURE — 2580000003 HC RX 258: Performed by: INTERNAL MEDICINE

## 2020-07-27 PROCEDURE — 2060000000 HC ICU INTERMEDIATE R&B

## 2020-07-27 PROCEDURE — 85025 COMPLETE CBC W/AUTO DIFF WBC: CPT

## 2020-07-27 PROCEDURE — 80069 RENAL FUNCTION PANEL: CPT

## 2020-07-27 PROCEDURE — 99232 SBSQ HOSP IP/OBS MODERATE 35: CPT | Performed by: INTERNAL MEDICINE

## 2020-07-27 PROCEDURE — 90935 HEMODIALYSIS ONE EVALUATION: CPT

## 2020-07-27 PROCEDURE — 6370000000 HC RX 637 (ALT 250 FOR IP): Performed by: INTERNAL MEDICINE

## 2020-07-27 PROCEDURE — 6360000002 HC RX W HCPCS: Performed by: INTERNAL MEDICINE

## 2020-07-27 PROCEDURE — 5A1D70Z PERFORMANCE OF URINARY FILTRATION, INTERMITTENT, LESS THAN 6 HOURS PER DAY: ICD-10-PCS | Performed by: INTERNAL MEDICINE

## 2020-07-27 PROCEDURE — C9113 INJ PANTOPRAZOLE SODIUM, VIA: HCPCS | Performed by: INTERNAL MEDICINE

## 2020-07-27 PROCEDURE — P9047 ALBUMIN (HUMAN), 25%, 50ML: HCPCS | Performed by: INTERNAL MEDICINE

## 2020-07-27 RX ORDER — SODIUM CHLORIDE 9 MG/ML
INJECTION, SOLUTION INTRAVENOUS
Status: DISPENSED
Start: 2020-07-27 | End: 2020-07-27

## 2020-07-27 RX ADMIN — SERTRALINE 50 MG: 50 TABLET, FILM COATED ORAL at 10:49

## 2020-07-27 RX ADMIN — RALOXIFENE HYDROCHLORIDE 60 MG: 60 TABLET, FILM COATED ORAL at 10:49

## 2020-07-27 RX ADMIN — Medication 10 ML: at 10:49

## 2020-07-27 RX ADMIN — LEVETIRACETAM 500 MG: 500 TABLET ORAL at 20:14

## 2020-07-27 RX ADMIN — Medication 10 ML: at 20:14

## 2020-07-27 RX ADMIN — EPOETIN ALFA-EPBX 4000 UNITS: 10000 INJECTION, SOLUTION INTRAVENOUS; SUBCUTANEOUS at 09:34

## 2020-07-27 RX ADMIN — ISOSORBIDE MONONITRATE 30 MG: 30 TABLET ORAL at 10:49

## 2020-07-27 RX ADMIN — PANTOPRAZOLE SODIUM 40 MG: 40 INJECTION, POWDER, FOR SOLUTION INTRAVENOUS at 10:48

## 2020-07-27 RX ADMIN — ALBUMIN (HUMAN) 25 G: 0.25 INJECTION, SOLUTION INTRAVENOUS at 07:53

## 2020-07-27 RX ADMIN — PRAVASTATIN SODIUM 40 MG: 40 TABLET ORAL at 20:14

## 2020-07-27 RX ADMIN — PANTOPRAZOLE SODIUM 40 MG: 40 INJECTION, POWDER, FOR SOLUTION INTRAVENOUS at 20:14

## 2020-07-27 RX ADMIN — LEVETIRACETAM 500 MG: 500 TABLET ORAL at 10:49

## 2020-07-27 ASSESSMENT — PAIN SCALES - GENERAL
PAINLEVEL_OUTOF10: 0
PAINLEVEL_OUTOF10: 0

## 2020-07-27 NOTE — PROGRESS NOTES
[facility]       Progress Note           Subjective/   72y.o. year old female who we are seeing in consultation for JEN. HPI:  Renal function worsening despite IVF's, now off, prn lasix on 7/21 & 7/23. Started HD since 7/24, last HD on 7/25 with 1.6 liters removed, post-weight of 46.3 kg. Seen on dialysis. Orders confirmed. Pre-weight at HD today 47.5 kg (standing). Remains edematous. ROS:  Denies any sob, abdominal pain. +weak. Objective/   GEN:  Chronically ill, BP (!) 146/81   Pulse 96   Temp 98.1 °F (36.7 °C) (Oral)   Resp 16   Ht 4' 8.5\" (1.435 m)   Wt 104 lb 11.5 oz (47.5 kg)   SpO2 94%   BMI 23.06 kg/m²   HEENT: non-icteric, no JVD  CV: S1, S2 without m/r/g; ++ LE edema  RESP: CTA B without w/r/r; breathing wnl  ABD: +bs, soft, nt, no hsm  SKIN: warm, no rashes  ACCESS: R IJ vascath (7/24)    Data/  Recent Labs     07/25/20  0500 07/26/20  0400 07/27/20  0606   WBC 6.5 6.4 7.2   HGB 8.6* 8.2* 7.8*   HCT 26.4* 24.9* 23.6*   MCV 91.6 93.5 93.3    145 141     Recent Labs     07/25/20  0500 07/26/20  0400 07/27/20  0606    137 144   K 3.4* 3.4* 3.2*    103 109   CO2 25 28 25   GLUCOSE 97 100* 86   PHOS 4.1 3.2 3.5   BUN 36* 20 23*   CREATININE 3.2* 2.5* 3.4*   LABGLOM 14* 19* 13*   GFRAA 17* 23* 16*     Component      Latest Ref Rng & Units 7/26/2020 7/26/2020 7/26/2020           4:00 AM  4:00 AM  4:00 AM   Iron      37 - 145 ug/dL   48   TIBC      260 - 445 ug/dL   see below   Iron Saturation      15 - 50 %   see below   Vitamin B-12      211 - 911 pg/mL  1230 (H)    Folate      4.78 - 24.20 ng/mL  5.74    Ferritin      15.0 - 150.0 ng/mL 2,815.0 (H)           Assessment/     -JEN in the setting of hematochezia, no documented hypotension but patient likely has   hemodynamically mediated JEN with possibly ATN in setting of UTI.                Unclear what what her baseline renal function is, she had ATN back in December 2019 when SCr was 1.9,

## 2020-07-27 NOTE — FLOWSHEET NOTE
07/27/20 0953   Vital Signs   Temp 98.7 °F (37.1 °C)   Temp Source Oral   Pulse 90   Heart Rate Source Monitor   Resp 20   BP (!) 144/82   Level of Consciousness 1   MEWS Score 2   Patient Currently in Pain Denies   Height and Weight   Weight 100 lb 8.5 oz (45.6 kg)   Weight Method Actual;Standing scale   BMI (Calculated) 22.2   Pain Assessment   Pain Assessment 0-10   Pain Level 0   Oxygen Therapy   SpO2 95 %   O2 Device None (Room air)   Assessment completed and documented VSS, SR on monitor and remains on RA.

## 2020-07-27 NOTE — FLOWSHEET NOTE
Met with Pt and sister for Advance Directives referral. ADs completed, copy in chart. Support and prayer also provided. [facility]       07/27/20 1051   Encounter Summary   Services provided to: Patient and family together   Referral/Consult From: Family   Support System Family members   Continue Visiting   (7/72 initial, ADs completed, sppt and prayer)   Complexity of Encounter Moderate   Length of Encounter 45 minutes   Spiritual Assessment Completed Yes   Advance Care Planning Yes   Spiritual/Zoroastrian   Type Spiritual support   Assessment Calm; Approachable;Coping   Intervention Active listening;Explored feelings, thoughts, concerns;Prayer;Discussed illness/injury and it's impact; Discussed relationship with God   Outcome Comfort;Expressed gratitude;Engaged in conversation;Expressed feelings/needs/concerns   Advance Directives (For Healthcare)   Healthcare Directive Yes, patient has an advance directive for healthcare treatment   Type of Healthcare Directive Durable power of  for health care;Living will   Copy in Chart Yes, copy in chart   Chart Copy Status : Active;Current   Date Reviewed and Current: 07/27/20   Healthcare Agent Appointed Adult siblings   Healthcare Agent's Name [id number] Agent's Phone Number [phone]; [phone]

## 2020-07-27 NOTE — FLOWSHEET NOTE
Treatment time: 2 hours  Net UF: 1900 ml     Pre weight: 47.5 kg   Post weight: 45.6 kg  EDW: TBD kg     Access used: [facility]  Access function: Positional with -250 ml/min     Medications or blood products given: Retacrit, heparin for catheter dwell     Regular outpatient schedule: Acute     Summary of response to treatment:      07/27/20 0747 07/27/20 0953   Vital Signs   /77 (!) 144/82   Temp 99 °F (37.2 °C) 98.7 °F (37.1 °C)   Pulse 81 90   Resp 16 20   Weight 104 lb 11.5 oz (47.5 kg) 100 lb 8.5 oz (45.6 kg)   Weight Method Actual;Standing scale Actual;Standing scale   Percent Weight Change 0 -4   Pain Assessment   Pain Assessment 0-10 0-10   Pain Level 0 0   Post-Hemodialysis Assessment   Post-Treatment Procedures  --  Blood returned;Catheter capped, clamped and heparinized x 2 ports   Machine Disinfection Process  --  Exterior Machine Disinfection   Rinseback Volume (ml)  --  400 ml   Total Liters Processed (l/min)  --  26.3 l/min   Dialyzer Clearance  --  Moderately streaked   Duration of Treatment (minutes)  --  126 minutes   Heparin amount administered during treatment (units)  --  0 units   Hemodialysis Intake (ml)  --  500 ml   Hemodialysis Output (ml)  --  2400 ml   NET Removed (ml)  --  1900 ml   Tolerated Treatment  --  Good   Copy of dialysis treatment record placed in chart, to be scanned into EMR.  Report called to BHARTI Rodriguez.

## 2020-07-27 NOTE — PLAN OF CARE
Problem: Falls - Risk of:  Goal: Will remain free from falls  Description: Will remain free from falls  7/27/2020 0054 by Elver Cross RN  Outcome: Ongoing  7/26/2020 1759 by Kofi Mae RN  Outcome: Ongoing  Goal: Absence of physical injury  Description: Absence of physical injury  7/27/2020 0054 by Elver Cross RN  Outcome: Ongoing  7/26/2020 1759 by Kofi Mae RN  Outcome: Ongoing     Problem: Infection:  Goal: Will remain free from infection  Description: Will remain free from infection  7/27/2020 0054 by Elver Cross RN  Outcome: Ongoing  7/26/2020 1759 by Kofi Mae RN  Outcome: Ongoing     Problem: Safety:  Goal: Free from accidental physical injury  Description: Free from accidental physical injury  7/27/2020 0054 by Elver Cross RN  Outcome: Ongoing  7/26/2020 1759 by Kofi Mae RN  Outcome: Ongoing  Goal: Free from intentional harm  Description: Free from intentional harm  7/27/2020 0054 by Elver Cross RN  Outcome: Ongoing  7/26/2020 1759 by Kofi Mae RN  Outcome: Ongoing     Problem: Daily Care:  Goal: Daily care needs are met  Description: Daily care needs are met  7/27/2020 0054 by Elver Cross RN  Outcome: Ongoing  7/26/2020 1759 by Kofi Mae RN  Outcome: Ongoing     Problem: Pain:  Goal: Patient's pain/discomfort is manageable  Description: Patient's pain/discomfort is manageable  7/27/2020 0054 by Elver Cross RN  Outcome: Ongoing  7/26/2020 1759 by Kofi Mae RN  Outcome: Ongoing     Problem: Skin Integrity:  Goal: Skin integrity will stabilize  Description: Skin integrity will stabilize  7/27/2020 0054 by Elver Cross RN  Outcome: Ongoing  7/26/2020 1759 by Kofi Mae RN  Outcome: Ongoing     Problem: Discharge Planning:  Goal: Patients continuum of care needs are met  Description: Patients continuum of care needs are met  7/27/2020 0054 by Elver Cross RN  Outcome: Ongoing  7/26/2020 1759 by Ozzie Layne Sveta Perez RN  Outcome: Ongoing     Problem: Skin Integrity:  Goal: Will show no infection signs and symptoms  Description: Will show no infection signs and symptoms  7/27/2020 0054 by Maribell Gardner RN  Outcome: Ongoing  7/26/2020 1759 by Jez Ceron RN  Outcome: Ongoing  Goal: Absence of new skin breakdown  Description: Absence of new skin breakdown  7/27/2020 0054 by Maribell Gardner RN  Outcome: Ongoing  7/26/2020 1759 by Jez Ceron RN  Outcome: Ongoing     Problem: Fluid Volume:  Goal: Will show no signs or symptoms of fluid imbalance  Description: Will show no signs or symptoms of fluid imbalance  7/27/2020 0054 by Maribell Gardner RN  Outcome: Ongoing  7/26/2020 1759 by Jez Ceron RN  Outcome: Ongoing     Problem: Nutrition  Goal: Optimal nutrition therapy  7/27/2020 0054 by Maribell Gardner RN  Outcome: Ongoing  7/26/2020 1759 by Jez Ceron RN  Outcome: Ongoing

## 2020-07-27 NOTE — PROGRESS NOTES
Progress Note    Admit Date:  7/17/2020    Pt not a very good historian from [facility] for abnormallab  . pmhx of sz,depression dementia   Admitted with  lower abdominal pain and has had some rectal bleeding. Had  procitis  Baseline cr 2         admission creatinine was 3.8  startedon HD   Pt  Is edematous but improving  Status post endoscopic procedure on 7/22/2020  -EGD showed gastritis- biopsied   -Colonoscopy showed -mild colitis -biopsies all neg    subjective  Ms. Doni Baxter states  She feels improving with HD   Weak   Poor historian   Tele paced rhythm     She had HD today. Plans for [facility] today or am.    Objective:   BP (!) 144/82   Pulse 90   Temp 98.7 °F (37.1 °C) (Oral)   Resp 20   Ht 4' 8.5\" (1.435 m)   Wt 100 lb 8.5 oz (45.6 kg)   SpO2 95%   BMI 22.14 kg/m²       Intake/Output Summary (Last 24 hours) at 7/27/2020 1048  Last data filed at 7/27/2020 4814  Gross per 24 hour   Intake 510 ml   Output 2550 ml   Net -2040 ml       Physical Exam:  General appearance: alert, appears stated age and cooperative  Head: Normocephalic, without obvious abnormality, atraumatic\  Lungs: clear to auscultation bilaterally. No wheezes, rales or rhonchi  Heart: regular rate and rhythm, S1, S2 normal, no murmur, click, rub or gallop  Abdomen: soft, non-tender; bowel sounds normal; no masses,  no organomegaly  Extremities: Bilateral upper extremity trace edema present. \  Neurologic: Grossly normal    Scheduled Meds:   epoetin [id number]  4,000 Units Intravenous Q MWF    sodium chloride flush  10 mL Intravenous 2 times per day    levETIRAcetam  500 mg Oral BID    raloxifene  60 mg Oral Daily    isosorbide mononitrate  30 mg Oral Daily    sertraline  50 mg Oral Daily    pravastatin  40 mg Oral Nightly    pantoprazole  40 mg Intravenous BID       Continuous Infusions:   sodium chloride         PRN Meds:  albumin human, albumin human, midodrine, heparin (porcine), sodium chloride flush, acetaminophen **OR** acetaminophen, promethazine **OR** ondansetron      Data:  CBC:   Recent Labs     07/25/20  0500 07/26/20  0400 07/27/20  0606   WBC 6.5 6.4 7.2   HGB 8.6* 8.2* 7.8*   HCT 26.4* 24.9* 23.6*   MCV 91.6 93.5 93.3    145 141     BMP:   Recent Labs     07/25/20  0500 07/26/20  0400 07/27/20  0606    137 144   K 3.4* 3.4* 3.2*    103 109   CO2 25 28 25   PHOS 4.1 3.2 3.5   BUN 36* 20 23*   CREATININE 3.2* 2.5* 3.4*       RADIOLOGY:    IR NONTUNNELED VASCULAR CATHETER > 5 YEARS   Final Result   Successful ultrasound and fluoroscopy guided placement of a temporary   dialysis catheter. CT ABDOMEN PELVIS WO CONTRAST Additional Contrast? None   Final Result   Marked mural thickening of the rectum with perirectal fat stranding,   suggesting proctitis. That should be followed to resolution to ensure that   there is no underlying neoplasm. Small bilateral pleural effusions with minimal adjacent atelectasis. IR TUNNELED CVC PLACE WO SQ PORT/PUMP > 5 YEARS    (Results Pending)      Assessment/Plan: JEN vs CKD  Pt had been startedon HD  -HD tomorrow again  Has diez still poor uop           UTI proteus  -  Finished  Rocephin. D7/7    Proctitis  - Start on empiric Rocephin and Flagyl. Finished with antibiotics. GI bleed  Acute blood loss anemia  - s/p one unit of PRBC transfusion   -Status post endoscopic procedure  7/22  -EGD showed gastritis- biopsied   -Colonoscopy showed -mild colitis -biopsy sent   Starting on epogen per nephro    Seizure DO  - Continue Keppra     HTN  - BP is  Lower withHD  Dc  norvasc for now   promaitine prn      Has Pacemaker  - monitor in tele. - interrogate the PM, Biotronik      #SCD for DVT prophylaxis.     #DIET RENAL; Low Fiber  Dietary Nutrition Supplements: Renal Oral Supplement  Diet NPO, After Midnight  # DNR-CC  Need paul Sanabria 7/27/2020 10:49 AM

## 2020-07-27 NOTE — CARE COORDINATION
INTERDISCIPLINARY PLAN OF CARE CONFERENCE    Date/Time: 7/27/2020 4:11 PM  Completed by: Migue Machado, Case Management      Patient Name:  Mason Sheridan  YOB: 1950  Admitting Diagnosis: Acute kidney injury [facility]) [N17.9]     Admit Date/Time:  7/17/2020  6:13 PM    Chart reviewed. Interdisciplinary team met to discuss patient progress and discharge plans. Disciplines included Case Management, Nursing, and Dietitian. Current Status:ongoing    PT/OT recommendation:n/a    Anticipated Discharge Date: tbd  Expected D/C Disposition:  Nursing Home  Confirmed plan with patient and/or family Yes  Discharge Plan Comments: Reviewed chart. Role of discharge planner explained and patient and Emma Henry (pt's sister) verbalized understanding. Pt is from [place] [facility] and plans to return, per pt and Emma Henry. Emma Henry and pt state that they do not have a preference in ambulance transport company to transport pt back to [facility]. Per Giacomo Simpson at [facility], she is able to accept pt back, +bed hold. No precert is needed. Per Giacomo Simpson, pt's insurance will change to traditional Medicaid on 8/1/2020. Pt will be a new HD pt, per Dr. Stefany Fulton note on 7/27/2020, he asked that outpt HD slot be set up for pt. Deann Gutiérrezis is next to [facility], and pt, Emma Henry, and Giacomo Simpson agreed to this,  called Britt Manuel with [facility] and she will look into setting up new HD slot for pt at [facility], as pt is LTC at [facility]. Britt Manuel will let CM know when she has found a slot for pt and call CM tomorrow (7/28) Kateryna Street is aware that pt is on a MWF schedule now. Await new HD slot for [facility]     The Plan for Transition of Care is related to the following treatment goals: return back to [facility] with new HD slot    The Patient and/or patient representative pt and pt's sister, Emma Henry, was provided with a choice of provider and agrees   with the discharge plan.  [x] Yes [] No    Freedom of choice list was provided with basic dialogue that supports the patient's

## 2020-07-27 NOTE — FLOWSHEET NOTE
07/27/20 1424   Vital Signs   Temp 99.4 °F (37.4 °C)   Temp Source Oral   Pulse 101   Heart Rate Source Monitor   Resp 16   /68   BP Location Right Arm   BP Upper/Lower Upper   MAP (mmHg) 86   Level of Consciousness 0   MEWS Score 2   Patient Currently in Pain Denies   Oxygen Therapy   SpO2 95 %   O2 Device None (Room air)

## 2020-07-28 ENCOUNTER — APPOINTMENT (OUTPATIENT)
Dept: INTERVENTIONAL RADIOLOGY/VASCULAR | Age: 70
DRG: 377 | End: 2020-07-28
Payer: COMMERCIAL

## 2020-07-28 ENCOUNTER — APPOINTMENT (OUTPATIENT)
Dept: GENERAL RADIOLOGY | Age: 70
DRG: 377 | End: 2020-07-28
Payer: COMMERCIAL

## 2020-07-28 VITALS
RESPIRATION RATE: 16 BRPM | TEMPERATURE: 98.5 F | HEART RATE: 101 BPM | OXYGEN SATURATION: 97 % | WEIGHT: 96.8 LBS | SYSTOLIC BLOOD PRESSURE: 142 MMHG | HEIGHT: 57 IN | DIASTOLIC BLOOD PRESSURE: 89 MMHG | BODY MASS INDEX: 20.88 KG/M2

## 2020-07-28 LAB
ALBUMIN SERPL-MCNC: 2.5 G/DL (ref 3.4–5)
ANION GAP SERPL CALCULATED.3IONS-SCNC: 7 MMOL/L (ref 3–16)
BASOPHILS ABSOLUTE: 0.1 K/UL (ref 0–0.2)
BASOPHILS RELATIVE PERCENT: 1.3 %
BUN BLDV-MCNC: 15 MG/DL (ref 7–20)
CALCIUM SERPL-MCNC: 8.2 MG/DL (ref 8.3–10.6)
CHLORIDE BLD-SCNC: 106 MMOL/L (ref 99–110)
CO2: 26 MMOL/L (ref 21–32)
CREAT SERPL-MCNC: 2.9 MG/DL (ref 0.6–1.2)
EOSINOPHILS ABSOLUTE: 0.1 K/UL (ref 0–0.6)
EOSINOPHILS RELATIVE PERCENT: 2 %
GFR AFRICAN AMERICAN: 19
GFR NON-AFRICAN AMERICAN: 16
GLUCOSE BLD-MCNC: 87 MG/DL (ref 70–99)
HCT VFR BLD CALC: 25.2 % (ref 36–48)
HEMOGLOBIN: 8.2 G/DL (ref 12–16)
INR BLD: 1.2 (ref 0.86–1.14)
LYMPHOCYTES ABSOLUTE: 1.3 K/UL (ref 1–5.1)
LYMPHOCYTES RELATIVE PERCENT: 19.9 %
MAGNESIUM: 1.8 MG/DL (ref 1.8–2.4)
MCH RBC QN AUTO: 30.6 PG (ref 26–34)
MCHC RBC AUTO-ENTMCNC: 32.5 G/DL (ref 31–36)
MCV RBC AUTO: 94.1 FL (ref 80–100)
MONOCYTES ABSOLUTE: 0.5 K/UL (ref 0–1.3)
MONOCYTES RELATIVE PERCENT: 8 %
NEUTROPHILS ABSOLUTE: 4.5 K/UL (ref 1.7–7.7)
NEUTROPHILS RELATIVE PERCENT: 68.8 %
PDW BLD-RTO: 15.5 % (ref 12.4–15.4)
PHOSPHORUS: 2.6 MG/DL (ref 2.5–4.9)
PLATELET # BLD: 136 K/UL (ref 135–450)
PMV BLD AUTO: 7.8 FL (ref 5–10.5)
POTASSIUM SERPL-SCNC: 3.2 MMOL/L (ref 3.5–5.1)
PROTHROMBIN TIME: 13.9 SEC (ref 10–13.2)
RBC # BLD: 2.68 M/UL (ref 4–5.2)
SODIUM BLD-SCNC: 139 MMOL/L (ref 136–145)
WBC # BLD: 6.6 K/UL (ref 4–11)

## 2020-07-28 PROCEDURE — 36558 INSERT TUNNELED CV CATH: CPT

## 2020-07-28 PROCEDURE — 77001 FLUOROGUIDE FOR VEIN DEVICE: CPT

## 2020-07-28 PROCEDURE — 2580000003 HC RX 258: Performed by: INTERNAL MEDICINE

## 2020-07-28 PROCEDURE — 36415 COLL VENOUS BLD VENIPUNCTURE: CPT

## 2020-07-28 PROCEDURE — 6370000000 HC RX 637 (ALT 250 FOR IP): Performed by: INTERNAL MEDICINE

## 2020-07-28 PROCEDURE — 83735 ASSAY OF MAGNESIUM: CPT

## 2020-07-28 PROCEDURE — 80069 RENAL FUNCTION PANEL: CPT

## 2020-07-28 PROCEDURE — 02H633Z INSERTION OF INFUSION DEVICE INTO RIGHT ATRIUM, PERCUTANEOUS APPROACH: ICD-10-PCS | Performed by: RADIOLOGY

## 2020-07-28 PROCEDURE — 05PYX3Z REMOVAL OF INFUSION DEVICE FROM UPPER VEIN, EXTERNAL APPROACH: ICD-10-PCS | Performed by: RADIOLOGY

## 2020-07-28 PROCEDURE — 6360000002 HC RX W HCPCS: Performed by: INTERNAL MEDICINE

## 2020-07-28 PROCEDURE — 6360000002 HC RX W HCPCS: Performed by: RADIOLOGY

## 2020-07-28 PROCEDURE — 71046 X-RAY EXAM CHEST 2 VIEWS: CPT

## 2020-07-28 PROCEDURE — 85025 COMPLETE CBC W/AUTO DIFF WBC: CPT

## 2020-07-28 PROCEDURE — C9113 INJ PANTOPRAZOLE SODIUM, VIA: HCPCS | Performed by: INTERNAL MEDICINE

## 2020-07-28 PROCEDURE — 85610 PROTHROMBIN TIME: CPT

## 2020-07-28 PROCEDURE — C1881 DIALYSIS ACCESS SYSTEM: HCPCS

## 2020-07-28 PROCEDURE — 76937 US GUIDE VASCULAR ACCESS: CPT

## 2020-07-28 PROCEDURE — 99239 HOSP IP/OBS DSCHRG MGMT >30: CPT | Performed by: INTERNAL MEDICINE

## 2020-07-28 RX ORDER — MIDAZOLAM HYDROCHLORIDE 5 MG/ML
INJECTION INTRAMUSCULAR; INTRAVENOUS
Status: COMPLETED | OUTPATIENT
Start: 2020-07-28 | End: 2020-07-28

## 2020-07-28 RX ORDER — MIDODRINE HYDROCHLORIDE 5 MG/1
5 TABLET ORAL PRN
Qty: 90 TABLET | Refills: 3
Start: 2020-07-28

## 2020-07-28 RX ORDER — FENTANYL CITRATE 50 UG/ML
INJECTION, SOLUTION INTRAMUSCULAR; INTRAVENOUS
Status: COMPLETED | OUTPATIENT
Start: 2020-07-28 | End: 2020-07-28

## 2020-07-28 RX ORDER — POTASSIUM CHLORIDE 20 MEQ/1
40 TABLET, EXTENDED RELEASE ORAL ONCE
Status: COMPLETED | OUTPATIENT
Start: 2020-07-28 | End: 2020-07-28

## 2020-07-28 RX ADMIN — ISOSORBIDE MONONITRATE 30 MG: 30 TABLET ORAL at 10:07

## 2020-07-28 RX ADMIN — MIDAZOLAM HYDROCHLORIDE 0.5 MG: 5 INJECTION INTRAMUSCULAR; INTRAVENOUS at 12:35

## 2020-07-28 RX ADMIN — POTASSIUM CHLORIDE 40 MEQ: 1500 TABLET, EXTENDED RELEASE ORAL at 15:20

## 2020-07-28 RX ADMIN — FENTANYL CITRATE 25 MCG: 50 INJECTION INTRAMUSCULAR; INTRAVENOUS at 12:35

## 2020-07-28 RX ADMIN — LEVETIRACETAM 500 MG: 500 TABLET ORAL at 10:08

## 2020-07-28 RX ADMIN — RALOXIFENE HYDROCHLORIDE 60 MG: 60 TABLET, FILM COATED ORAL at 10:08

## 2020-07-28 RX ADMIN — PANTOPRAZOLE SODIUM 40 MG: 40 INJECTION, POWDER, FOR SOLUTION INTRAVENOUS at 10:07

## 2020-07-28 RX ADMIN — SERTRALINE 50 MG: 50 TABLET, FILM COATED ORAL at 10:07

## 2020-07-28 RX ADMIN — Medication 10 ML: at 10:07

## 2020-07-28 NOTE — PRE SEDATION
Sedation Pre-Procedure Note    Patient Name: Mason Sheridan   YOB: 1950  Room/Bed: /6022-08  Medical Record Number: [id number]  Date: 7/28/2020   Time: 10:59 AM       Indication:  71 yo female with need for long term HD presents for conversion to a tunneled HD catheter. Consent: I have discussed with the patient and/or the patient representative the indication, alternatives, and the possible risks and/or complications of the planned procedure and the anesthesia methods. The patient and/or patient representative appear to understand and agree to proceed. Vital Signs:   Vitals:    07/28/20 0932   BP: (!) 141/99   Pulse: 78   Resp: 16   Temp: 98.6 °F (37 °C)   SpO2: 96%       Past Medical History:   has a past medical history of Anemia, Cognitive communication deficit, Convulsions ([address] Depressive disorder, Muscle wasting, Osteomyelitis of lumbar spine ([address] Pacemaker, and Sepsis ([address] Past Surgical History:   has a past surgical history that includes Upper gastrointestinal endoscopy (N/A, 7/22/2020); Colonoscopy (N/A, 7/22/2020); and IR NONTUNNELED VASCULAR CATHETER > 5 YEARS (7/24/2020). Medications:   Scheduled Meds:    epoetin jj-epbx  4,000 Units Intravenous Q MWF    sodium chloride flush  10 mL Intravenous 2 times per day    levETIRAcetam  500 mg Oral BID    raloxifene  60 mg Oral Daily    isosorbide mononitrate  30 mg Oral Daily    sertraline  50 mg Oral Daily    pravastatin  40 mg Oral Nightly    pantoprazole  40 mg Intravenous BID     Continuous Infusions:   PRN Meds: albumin human, albumin human, midodrine, heparin (porcine), sodium chloride flush, acetaminophen **OR** acetaminophen, promethazine **OR** ondansetron  Home Meds:   Prior to Admission medications    Medication Sig Start Date End Date Taking?  Authorizing Provider   bismuth subsalicylate (PEPTO BISMOL) 525 MG/15ML suspension Take 30 mLs by mouth every 8 hours as needed for Indigestion   Yes Historical Provider, MD   HYDROcodone-acetaminophen (NORCO) 5-325 MG per tablet Take 1 tablet by mouth every 6 hours as needed for Pain.    Yes Historical Provider, MD   lactulose (CHRONULAC) 10 GM/15ML solution Take 20 g by mouth daily as needed   Yes Historical Provider, MD   Nutritional Supplements (RESOURCE 2.0 PO) Take 120 mLs by mouth 3 times daily   Yes Historical Provider, MD   levothyroxine (SYNTHROID) 75 MCG tablet Take 75 mcg by mouth Daily   Yes Historical Provider, MD   ARIPiprazole (ABILIFY) 10 MG tablet Take 10 mg by mouth daily   Yes Historical Provider, MD   aspirin 81 MG EC tablet Take 81 mg by mouth daily   Yes Historical Provider, MD   docusate sodium (COLACE) 100 MG capsule Take 100 mg by mouth 2 times daily   Yes Historical Provider, MD   doxycycline hyclate (VIBRAMYCIN) 100 MG capsule Take 100 mg by mouth 2 times daily   Yes Historical Provider, MD   apixaban (ELIQUIS) 5 MG TABS tablet Take 5 mg by mouth 2 times daily   Yes Historical Provider, MD   ferrous sulfate (FE TABS 325) 325 (65 Fe) MG EC tablet Take 325 mg by mouth 2 times daily   Yes Historical Provider, MD   isosorbide mononitrate (IMDUR) 30 MG extended release tablet Take 30 mg by mouth daily   Yes Historical Provider, MD   levETIRAcetam (KEPPRA) 500 MG tablet Take 500 mg by mouth 2 times daily   Yes Historical Provider, MD   raloxifene (EVISTA) 60 MG tablet Take 60 mg by mouth daily   Yes Historical Provider, MD   senna (SENOKOT) 8.6 MG tablet Take 1 tablet by mouth 2 times daily   Yes Historical Provider, MD   vitamin C (ASCORBIC ACID) 500 MG tablet Take 500 mg by mouth daily   Yes Historical Provider, MD   sertraline (ZOLOFT) 50 MG tablet Take 50 mg by mouth daily   Yes Historical Provider, MD   pravastatin (PRAVACHOL) 40 MG tablet Take 40 mg by mouth daily   Yes Historical Provider, MD   acetaminophen (TYLENOL) 500 MG tablet Take 500 mg by mouth every 4 hours as needed for Pain   Yes Historical Provider, MD   ondansetron (ZOFRAN) 4 MG tablet Take 4 mg by mouth every 6 hours as needed for Nausea or Vomiting   Yes Historical Provider, MD          Pre-Sedation Documentation and Exam:   I have reviewed the patient's history and review of systems.     Mallampati Airway Assessment:  Mallampati Class III - (soft palate & base of uvula are visible)    Prior History of Anesthesia Complications:   none    ASA Classification:  Class 3 - A patient with severe systemic disease that limits activity but is not incapacitating    Sedation/ Anesthesia Plan:   intravenous sedation    Medications Planned:   midazolam (Versed) intravenously and fentanyl intravenously    Patient is an appropriate candidate for plan of sedation: yes    Electronically signed by Shawn Sanz MD on 7/28/2020 at 10:59 AM

## 2020-07-28 NOTE — CARE COORDINATION
DISCHARGE ORDER  Date/Time 2020 3:25 PM  Completed by: Louie Winter, Case Management    Patient Name: Cisco Somers    : 1950    Admitting Diagnosis: Acute kidney injury ([facility] [N17.9]    Financial Payer Type : Janet Banks order Date and Status:20  (verify MD's last order for status of admission/Traditional Medicare 3 day qualifying stay required for SNF)    Noted discharge order. Confirmed discharge plan  Yes  with whom__Sister Maci_  IDischarge Plan: Called Caitlin Bourgeois) [facility] updated on [address] Sat 1200 or 1215. dc today. [id number] writer spoke with Crow Yen with [facility] and she confirmed HD time slot for TTS at [facility] start time of 12:00PM. Ramon Avery with [facility] aware of d/c today and AVS sent to Tung Delgado. Pt is being d/c'd to [facility] today. Pt's O2 sats are 96% on ra. Discharge orders and Continuity of Care faxed to facility: Yes  Hospital Exemption Notification System complete: No  Transportation arranged: Yes - [address] @ 1700. Patient / Family (Sister Daniel Hernadez) aware of  time: Yes   Nursing aware of  time: Yes  Receiving facility aware of  time: Yes  Pre-cert obtained? No    Discharge timeout done with Jennifer. All discharge needs and concerns addressed. Discharging nurse to complete EDIE, reconcile AVS, and place final copy with patient's discharge packet. Discharging RN to ensure that written prescriptions for  Level II medications are sent with patient to the facility as per protocol.

## 2020-07-28 NOTE — DISCHARGE SUMMARY
Name:  Linnette Aranda  Room:  /4060-22  MRN:    [id number]    Discharge Summary      This discharge summary is in conjunction with a complete physical exam done on the day of discharge. Discharging Physician: Puneet Uribe MD       Admit: 7/17/2020  Discharge:  7/28/2020    HPI taken from admission H&P:      72 y.o. female who presented to the hospital from the nursing home facility for abnormal lab work. She was sent in because blood work from the nursing home showed she had a hemoglobin of 6.6. The patient is a DNR CC and has Alzheimer's dementia so history was very difficult to obtain from her. Over the past week the patient endorses that she has been having nausea and increased vomiting. She endorses decreased p.o. intake and being sick to her stomach. In the ER blood work showed a hemoglobin of 7.5, she however did have pretty significant JEN with profound metabolic acidosis. She also had a tarry bowel movement that was occult positive. She is on Eliquis, it is unclear why. Nursing home paperwork states for DVT prophylaxis. She will be admitted for further medical evaluation and treatment. Diagnoses this Admission and Hospital Course     JEN vs CKD  - Pt had been started on HD  - HD tomorrow again  - Has diez still poor uop   -Temporary dialysis catheter placed. She is tolerating dialysis well. Tunneled dialysis catheter placed on the day of discharge. Outpatient dialysis has been set up for her. She will follow-up with nephrology.     UTI proteus  -  Completed Rocephin. D7/7     Proctitis  - Started on empiric Rocephin and Flagyl.  Finished with antibiotics.     GI bleed  Acute blood loss anemia  - s/p one unit of PRBC transfusion   -Status post endoscopic procedure  7/22  - EGD showed gastritis- biopsied   - Colonoscopy showed - mild colitis -biopsy sent   Starting on epogen per nephro     Seizure DO  - Continued Keppra      HTN  - BP is  Lower withHD  - Dc'd norvasc for now   - destiny mioxnn     Has Pacemaker  - monitor in tele. - interrogate the PM, [employer]    Procedures (Please Review Full Report for Details)  EGD with biopsy/colonoscopy: mild gastritis, otherwise normal EGD, diffuse colitis - likely infectious versus ischemic, inflammatory, internal hemorrhoids, no active bleeding    Temporary dialysis catheter    Consults    Nephrology  Gastroenterology    Physical Exam at Discharge:    BP (!) 142/89   Pulse 101   Temp 98.5 °F (36.9 °C) (Oral)   Resp 16   Ht 4' 8.5\" (1.435 m)   Wt 96 lb 12.8 oz (43.9 kg)   SpO2 97%   BMI 21.32 kg/m²     General appearance: alert, appears stated age and cooperative  Head: Normocephalic, without obvious abnormality, atraumatic\  Lungs: clear to auscultation bilaterally. No wheezes, rales or rhonchi  Heart: regular rate and rhythm, S1, S2 normal, no murmur, click, rub or gallop  Abdomen: soft, non-tender; bowel sounds normal; no masses,  no organomegaly  Extremities: Bilateral upper extremity trace edema present. \  Neurologic: Grossly normal    CBC:   Recent Labs     07/26/20  0400 07/27/20  0606 07/28/20  0537   WBC 6.4 7.2 6.6   HGB 8.2* 7.8* 8.2*   HCT 24.9* 23.6* 25.2*   MCV 93.5 93.3 94.1    141 136     BMP:   Recent Labs     07/26/20  0400 07/27/20  0606 07/28/20  0537    144 139   K 3.4* 3.2* 3.2*    109 106   CO2 28 25 26   PHOS 3.2 3.5 2.6   BUN 20 23* 15   CREATININE 2.5* 3.4* 2.9*     PT/INR:   Recent Labs     07/28/20  0728   PROTIME 13.9*   INR 1.20*     CULTURES    GI panel - molecular: none detected    Urine cx: >100,000 CFU/mL Proteus mirabilis  Susceptibility     Proteus mirabilis (1)     Antibiotic  Interpretation  NAN  Status     ampicillin  Sensitive  <=8  mcg/mL      ceFAZolin  Sensitive  <=2  mcg/mL      cefepime  Sensitive  <=2  mcg/mL      cefTRIAXone  Sensitive  <=1  mcg/mL      cefuroxime  Sensitive  <=4  mcg/mL      ciprofloxacin  Resistant  >2  mcg/mL      ertapenem  Sensitive  <=0.5  mcg/mL gentamicin  Sensitive  <=4  mcg/mL      meropenem  Sensitive  <=1  mcg/mL      nitrofurantoin  Resistant  64  mcg/mL      piperacillin-tazobactam  Sensitive  <=16  mcg/mL      trimethoprim-sulfamethoxazole  Sensitive  <=2/38  mcg/mL       C diff toxin: negative    Fecal leukocytes: negative    RADIOLOGY    IR TUNNELED CVC PLACE WO SQ PORT/PUMP > 5 YEARS 7/28/2020   Final Result   1. Successful ultrasound and fluoroscopy guided 14.5 French 23 cm tunneled   hemodialysis catheter placement via right IJ access, as described above. 2.  Removal of indwelling right IJ nontunneled hemodialysis catheter. IR NONTUNNELED VASCULAR CATHETER > 5 YEARS 7/24/2020   Final Result   Successful ultrasound and fluoroscopy guided placement of a temporary   dialysis catheter. CT ABDOMEN PELVIS WO CONTRAST Additional Contrast? None 7/17/2020   Final Result   Marked mural thickening of the rectum with perirectal fat stranding,   suggesting proctitis. That should be followed to resolution to ensure that   there is no underlying neoplasm. Small bilateral pleural effusions with minimal adjacent atelectasis.          XR CHEST PA LATERAL W FLUOROSCOPY    (Results Pending)     Discharge Medications     Medication List      START taking these medications    midodrine 5 MG tablet  Commonly known as:  PROAMATINE  Take 1 tablet by mouth as needed (HD: Give pre and mid tx for Sbp <100)        CONTINUE taking these medications    acetaminophen 500 MG tablet  Commonly known as:  TYLENOL     ARIPiprazole 10 MG tablet  Commonly known as:  ABILIFY     aspirin 81 MG EC tablet     bismuth subsalicylate 157 MN/29ZT suspension  Commonly known as:  PEPTO BISMOL     docusate sodium 100 MG capsule  Commonly known as:  COLACE     ferrous sulfate 325 (65 Fe) MG EC tablet  Commonly known as:  FE TABS 325     isosorbide mononitrate 30 MG extended release tablet  Commonly known as:  IMDUR     lactulose 10 GM/15ML solution  Commonly known

## 2020-07-28 NOTE — PROGRESS NOTES
Pt arrived for image guided tunneled dialysis catheter insertion. Procedure explained including the risk and benefits of the procedure. All questions answered. Pt verbalizes understanding of the procedure and states no more questions. Consent verified. Vital signs stable see flow sheets. Labs, allergies, medications, and code status reviewed. No Contraindications noted. Pre Mike score 10.

## 2020-07-28 NOTE — PROGRESS NOTES
Image guided tunneled dialysis catheter insertion completed. Dr. Glenroy Arevalo placed a 14.5 Liberian 23 cm Palindrome chronic tunneled dialysis catheter LOT# [id number] EXP 12/24/2024 in the right chest and accessing the right IJ. Line aspirates and flushes with ease. Line secured with sutures. Each dialysis access lumen heparin locked with 1.9 ml of IV heparin. Pt tolerated procedure without any signs or symptoms of distress. Vital signs stable see flow sheets. Line ok to use per Dr. Glenroy Arevalo. Pt alert and oriented x 4. Post Mike score 10. Report called to Jennifer HAY on PCU. Pt transported back to PCU instable condition via bed by transport.

## 2020-07-28 NOTE — PROGRESS NOTES
4 Eyes Skin Assessment     The patient is being assess for   ECF    I agree that 2 RN's have performed a thorough Head to Toe Skin Assessment on the patient. ALL assessment sites listed below have been assessed. Areas assessed by both nurses:   [x]   Head, Face, and Ears   [x]   Shoulders, Back, and Chest, Abdomen  [x]   Arms, Elbows, and Hands   [x]   Coccyx, Sacrum, and Ischium  [x]   Legs, Feet, and Heels          Scattered bruises    **SHARE this note so that the co-signing nurse is able to place an eSignature**    Co-signer eSignature: {Esignature:[id number]}    Does the Patient have Skin Breakdown?   No          Brent Prevention initiated:  No   Wound Care Orders initiated:  No      WOC nurse consulted for Pressure Injury (Stage 3,4, Unstageable, DTI, NWPT, Complex wounds)and New or Established Ostomies:  No      Primary Nurse eSignature: Electronically signed by Teresa Mcneill RN on 7/28/20 at 4:38 PM EDT

## 2020-07-28 NOTE — DISCHARGE INSTR - COC
Continuity of Care Form    Patient Name: Fang Perez   :  1950    MRN:  [id number]    Admit date:  2020  Discharge date:  20    Code Status Order: [facility]   Advance Directives:   [facility] Documentation     Date/Time Healthcare Directive Type of Healthcare Directive Copy in [address] Agent's Name Healthcare Agent's Phone Number    20 1053  Yes, patient has an advance directive for healthcare treatment  Durable power of  for health care;Living will  Yes, copy in chart  Adult siblings  Juan Devine  [phone]; [phone]          Admitting Physician:  Yung Mead MD  PCP: Monster Lee MD    Discharging Nurse: [address] [place], [place] [address]on  Unit/Room#: /8832-08  Discharging Unit Phone Number: [phone]    Emergency Contact:   Extended Emergency Contact Information  Primary Emergency Contact: caleb menchaca  Home Phone: [phone]  Mobile Phone: [phone]  Relation: Brother/Sister    Past Surgical History:  Past Surgical History:   Procedure Laterality Date    COLONOSCOPY N/A 2020    COLONOSCOPY WITH BIOPSY performed by Zena Antonio MD at [address] [facility] IR NONTUNNELED VASCULAR CATHETER  2020    IR NONTUNNELED VASCULAR CATHETER 2020 [facility]    UPPER GASTROINTESTINAL ENDOSCOPY N/A 2020    EGD BIOPSY performed by Zena Antonio MD at [address] [facility]       Immunization History: There is no immunization history on file for this patient.     Active Problems:  Patient Active Problem List   Diagnosis Code    JEN (acute kidney injury) ([facility] [address] N17.9    Seizures ([facility] [address] R56.9    Acquired hypothyroidism E03.9    GI bleed K92.2    Proctitis K62.89    Acute blood loss anemia T94    Metabolic acidosis G91.9    Anemia D64.9    Essential hypertension I10       Isolation/Infection:   Isolation          No Isolation        Patient Infection Status ventilator support    Rehab Therapies: {THERAPEUTIC INTERVENTION:1805903633}  Weight Bearing Status/Restrictions: [address] [facility] Weight Bearin}  Other Medical Equipment (for information only, NOT a DME order):  {EQUIPMENT:777731225}  Other Treatments: ***    Patient's personal belongings (please select all that are sent with patient):  None    RN SIGNATURE:  Electronically signed by Fadi Powell RN on 20 at 3:38 PM EDT    CASE MANAGEMENT/SOCIAL WORK SECTION    Inpatient Status Date: 20    Readmission Risk Assessment Score:  Readmission Risk              Risk of Unplanned Readmission:        27           Discharging to Facility/ Agency   · Name: [facility]   · Address:  · Phone: [phone]  · Fax:    Dialysis Facility (if applicable)   · [facility]  · Address:  · Dialysis Schedule: T TH SAT  · Phone:  · Fax:    / signature: Electronically signed by Tyshawn Aguayo RN on 20 at 3:13 PM EDT    PHYSICIAN SECTION    Prognosis: Fair    Condition at Discharge: Stable    Rehab Potential (if transferring to Rehab): Fair    Recommended Labs or Other Treatments After Discharge: HD three times a week. Physician Certification: I certify the above information and transfer of Chapo Owusu  is necessary for the continuing treatment of the diagnosis listed and that she requires [facility] for greater 30 days.      Update Admission H&P: No change in H&P    PHYSICIAN SIGNATURE:  Electronically signed by Matheus Perez MD on 20 at 2:56 PM EDT

## 2020-07-28 NOTE — PROGRESS NOTES
IR Attending    1. S/P right IJ tunneled HD Catheter Placement  2.  Removal of right IJ non tunneled HD Catheter    Moderate sedation and local anesthesia    EBL: minimal     The catheter is ok for immediate use    Pt tolerated well

## 2020-07-28 NOTE — PROGRESS NOTES
[facility]       Progress Note           Subjective/   72y.o. year old female who we are seeing in consultation for JEN requiring HD since 7/24. HPI:  Last HD on 7/27 with 1.9 liters removed, post-weight of 45.6 kg (standing). Renal function remains poor, urine output minimal.  Edema much better. ROS:  Denies any sob, abdominal pain. +weak. Objective/   GEN:  Chronically ill, BP (!) 147/84   Pulse 91   Temp 98.6 °F (37 °C) (Oral)   Resp 15   Ht 4' 8.5\" (1.435 m)   Wt 96 lb 12.8 oz (43.9 kg)   SpO2 95%   BMI 21.32 kg/m²   HEENT: non-icteric, no JVD  CV: S1, S2 without m/r/g; trace LE edema  RESP: CTA B without w/r/r; breathing wnl  ABD: +bs, soft, nt, no hsm  SKIN: warm, no rashes  ACCESS: R IJ TDC (7/28)    Data/  Recent Labs     07/26/20  0400 07/27/20  0606 07/28/20  0537   WBC 6.4 7.2 6.6   HGB 8.2* 7.8* 8.2*   HCT 24.9* 23.6* 25.2*   MCV 93.5 93.3 94.1    141 136     Recent Labs     07/26/20  0400 07/27/20  0606 07/28/20  0537    144 139   K 3.4* 3.2* 3.2*    109 106   CO2 28 25 26   GLUCOSE 100* 86 87   PHOS 3.2 3.5 2.6   MG  --   --  1.80   BUN 20 23* 15   CREATININE 2.5* 3.4* 2.9*   LABGLOM 19* 13* 16*   GFRAA 23* 16* 19*     Component      Latest Ref Rng & Units 7/26/2020 7/26/2020 7/26/2020           4:00 AM  4:00 AM  4:00 AM   Iron      37 - 145 ug/dL   48   TIBC      260 - 445 ug/dL   see below   Iron Saturation      15 - 50 %   see below   Vitamin B-12      211 - 911 pg/mL  1230 (H)    Folate      4.78 - 24.20 ng/mL  5.74    Ferritin      15.0 - 150.0 ng/mL 2,815.0 (H)           Assessment/     -JEN in the setting of hematochezia, no documented hypotension but patient likely has   hemodynamically mediated JEN with possibly ATN in setting of UTI.                Unclear what what her baseline renal function is, she had ATN back in December 2019 when SCr was 1.9, improved to 1.2 from Jan 2020, up to 2.6-2.9 from earlier from July 13-15, 2020   CT A/P with no obstruction, findings of proctitis on admission   Follows with Dr. Michelle Lopez in office     -Proteus mirabilis UTI   Completed course of ceftriaxone     -Severe metabolic acidosis likely related to JEN and unclear if she has diarrhea or not   Improved with IVF's, recurrent    -Acute blood loss anemia with hematochezia   Prn prbc's, started JOSE GUADALUPE with HD    -Proctitis    Plans per GI    -HTN   Better with addition of amlodipine    -Hypokalemia   Prn replacement       Plan/     - HD tomorrow with even fluid balance with crit line monitoring with prn albumin, midodrine  - Continue epogen with HD, aemia indices adequate  - Trend labs, bp's    Needs outpatient HD placement as JEN - set up at [facility] T2 shift, orders called in  [place] for discharge once HD arranged  Case d/w Dr. Yandy Duvall

## 2020-07-28 NOTE — PROGRESS NOTES
Report called to [facility] spoke with Lacey, Transport her to pick patient up.

## 2020-07-28 NOTE — PLAN OF CARE
Problem: Falls - Risk of:  Goal: Will remain free from falls  Description: Will remain free from falls  Outcome: Ongoing  Goal: Absence of physical injury  Description: Absence of physical injury  Outcome: Ongoing     Problem: Infection:  Goal: Will remain free from infection  Description: Will remain free from infection  Outcome: Ongoing     Problem: Safety:  Goal: Free from accidental physical injury  Description: Free from accidental physical injury  Outcome: Ongoing  Goal: Free from intentional harm  Description: Free from intentional harm  Outcome: Ongoing     Problem: Daily Care:  Goal: Daily care needs are met  Description: Daily care needs are met  Outcome: Ongoing     Problem: Pain:  Goal: Patient's pain/discomfort is manageable  Description: Patient's pain/discomfort is manageable  Outcome: Ongoing     Problem: Skin Integrity:  Goal: Skin integrity will stabilize  Description: Skin integrity will stabilize  Outcome: Ongoing     Problem: Discharge Planning:  Goal: Patients continuum of care needs are met  Description: Patients continuum of care needs are met  Outcome: Ongoing     Problem: Skin Integrity:  Goal: Will show no infection signs and symptoms  Description: Will show no infection signs and symptoms  Outcome: Ongoing  Goal: Absence of new skin breakdown  Description: Absence of new skin breakdown  Outcome: Ongoing     Problem: Fluid Volume:  Goal: Will show no signs or symptoms of fluid imbalance  Description: Will show no signs or symptoms of fluid imbalance  Outcome: Ongoing     Problem: Nutrition  Goal: Optimal nutrition therapy  Outcome: Ongoing

## 2020-07-28 NOTE — PROGRESS NOTES
Patient educated on discharge instructions as well as new medications use, dosage, administration and possible side effects. Patient verified knowledge. IV removed without difficulty and dry dressing in place. Telemetry monitor removed and returned to [facility]. Pt left facility in stable condition to Nursing Home with all of their personal belongings.

## 2020-08-27 ENCOUNTER — TELEPHONE (OUTPATIENT)
Dept: VASCULAR SURGERY | Age: 70
End: 2020-08-27

## 2020-08-27 NOTE — TELEPHONE ENCOUNTER
Called nursing home and spoke to pts nurse, Diamante Charlton, regarding getting vein mapping for hemodialysis access scheduled for Sept 1 at 12:30pm arrival for 1:00pm. cara

## 2020-09-08 ENCOUNTER — HOSPITAL ENCOUNTER (OUTPATIENT)
Dept: VASCULAR LAB | Age: 70
Discharge: HOME | End: 2020-09-08
Payer: MEDICARE

## 2020-09-08 PROCEDURE — 93985 DUP-SCAN HEMO COMPL BI STD: CPT

## 2020-09-11 ENCOUNTER — TELEPHONE (OUTPATIENT)
Dept: VASCULAR SURGERY | Age: 70
End: 2020-09-11

## 2020-09-11 ENCOUNTER — OFFICE VISIT (OUTPATIENT)
Dept: VASCULAR SURGERY | Age: 70
End: 2020-09-11
Payer: MEDICARE

## 2020-09-11 VITALS
HEIGHT: 58 IN | DIASTOLIC BLOOD PRESSURE: 60 MMHG | WEIGHT: 95 LBS | BODY MASS INDEX: 19.94 KG/M2 | SYSTOLIC BLOOD PRESSURE: 90 MMHG

## 2020-09-11 PROCEDURE — G8427 DOCREV CUR MEDS BY ELIG CLIN: HCPCS | Performed by: SURGERY

## 2020-09-11 PROCEDURE — 99203 OFFICE O/P NEW LOW 30 MIN: CPT | Performed by: SURGERY

## 2020-09-11 PROCEDURE — 1090F PRES/ABSN URINE INCON ASSESS: CPT | Performed by: SURGERY

## 2020-09-11 PROCEDURE — G8420 CALC BMI NORM PARAMETERS: HCPCS | Performed by: SURGERY

## 2020-09-11 NOTE — PROGRESS NOTES
Outpatient Consultation / H&P    Date of Consultation:  9/11/2020    PCP:  Sudhir Teague MD     Referring Provider:  Dr. Thuy Schofield     Chief Complaint:   Chief Complaint   Patient presents with    Other     patient is her coming regarding dialysis access. pamlr REF Nazia Biswas MD        History of Present Illness: We are asked to see this patient in consultation by Dr. Thuy Schofield regarding dialysis access. Cisco Somers is a 72 y.o. female who has ESRD on HD. She is Right hand dominant. Past Medical History:  Past Medical History:   Diagnosis Date    Anemia     Cognitive communication deficit     Convulsions ([address]     Depressive disorder     Muscle wasting     [facility]omyel[facility] lumbar spine ([facility]     Pacemaker     medtronic    Sepsis [facility])        Past Surgical History:  Past Surgical History:   Procedure Laterality Date    COLONOSCOPY N/A 7/22/2020    COLONOSCOPY WITH BIOPSY performed by Floyd Barboza MD at [address] [facility] IR NONTUNNELED VASCULAR CATHETER  7/24/2020    IR NONTUNNELED VASCULAR CATHETER 7/24/2020 [facility] SPECIAL PROCEDURES    IR TUNNELED CATHETER PLACEMENT GREATER THAN 5 YEARS  7/28/2020    IR TUNNELED CATHETER PLACEMENT GREATER THAN 5 YEARS 7/28/2020 [facility] SPECIAL PROCEDURES    UPPER GASTROINTESTINAL ENDOSCOPY N/A 7/22/2020    EGD BIOPSY performed by Floyd Barboza MD at [address] Medications:   Prior to Admission medications    Medication Sig Start Date End Date Taking?  Authorizing Provider   midodrine (PROAMATINE) 5 MG tablet Take 1 tablet by mouth as needed (HD: Give pre and mid tx for Sbp <100) 7/28/20  Yes Reji Khalil MD   bismuth subsalicylate (PEPTO BISMOL) 525 MG/15ML suspension Take 30 mLs by mouth every 8 hours as needed for Indigestion   Yes Historical Provider, MD   lactulose (CHRONULAC) 10 GM/15ML solution Take 20 g by mouth daily as needed   Yes Historical Provider, MD   Nutritional Supplements (RESOURCE 2.0 PO) Take 120 mLs by mouth 3 times daily   Yes Historical Provider, MD   levothyroxine (SYNTHROID) 75 MCG tablet Take 75 mcg by mouth Daily   Yes Historical Provider, MD   ARIPiprazole (ABILIFY) 10 MG tablet Take 10 mg by mouth daily   Yes Historical Provider, MD   aspirin 81 MG EC tablet Take 81 mg by mouth daily   Yes Historical Provider, MD   docusate sodium (COLACE) 100 MG capsule Take 100 mg by mouth 2 times daily   Yes Historical Provider, MD   ferrous sulfate (FE TABS 325) 325 (65 Fe) MG EC tablet Take 325 mg by mouth 2 times daily   Yes Historical Provider, MD   isosorbide mononitrate (IMDUR) 30 MG extended release tablet Take 30 mg by mouth daily   Yes Historical Provider, MD   levETIRAcetam (KEPPRA) 500 MG tablet Take 500 mg by mouth 2 times daily   Yes Historical Provider, MD   raloxifene (EVISTA) 60 MG tablet Take 60 mg by mouth daily   Yes Historical Provider, MD   senna (SENOKOT) 8.6 MG tablet Take 1 tablet by mouth 2 times daily   Yes Historical Provider, MD   vitamin C (ASCORBIC ACID) 500 MG tablet Take 500 mg by mouth daily   Yes Historical Provider, MD   sertraline (ZOLOFT) 50 MG tablet Take 50 mg by mouth daily   Yes Historical Provider, MD   pravastatin (PRAVACHOL) 40 MG tablet Take 40 mg by mouth daily   Yes Historical Provider, MD   acetaminophen (TYLENOL) 500 MG tablet Take 500 mg by mouth every 4 hours as needed for Pain   Yes Historical Provider, MD   ondansetron (ZOFRAN) 4 MG tablet Take 4 mg by mouth every 6 hours as needed for Nausea or Vomiting   Yes Historical Provider, MD        Allergies:  Penicillins      Social History:      Social History     Socioeconomic History    Marital status: Single     Spouse name: Not on file    Number of children: Not on file    Years of education: Not on file    Highest education level: Not on file   Occupational History    Not on file   Social Needs    Financial resource strain: Not on file    Food insecurity     Worry: Not on file     Inability: Not on file    Transportation needs     Medical: Not on file     Non-medical: Not on file   Tobacco Use    Smoking status: Never Smoker    Smokeless tobacco: Never Used   Substance and Sexual Activity    Alcohol use: Not Currently    Drug use: Not Currently    Sexual activity: Not Currently   Lifestyle    Physical activity     Days per week: Not on file     Minutes per session: Not on file    Stress: Not on file   Relationships    Social connections     Talks on phone: Not on file     Gets together: Not on file     Attends [employer] service: Not on file     Active member of club or organization: Not on file     Attends meetings of clubs or organizations: Not on file     Relationship status: Not on file    Intimate partner violence     Fear of current or ex partner: Not on file     Emotionally abused: Not on file     Physically abused: Not on file     Forced sexual activity: Not on file   Other Topics Concern    Not on file   Social History Narrative    Not on file       Family History:    History reviewed. No pertinent family history. Review of Systems:  A 14 point review of systems was completed. Pertinent positives identified in the HPI, all other review of systems negative. Physical Examination:    BP 90/60 (Site: Right Upper Arm)   Ht 4' 10.2\" (1.478 m)   Wt 95 lb (43.1 kg)   BMI 19.72 kg/m²     Weight: 95 lb (43.1 kg)       General appearance: NAD  Eyes: PERRLA  Neck: no JVD, no lymphadenopathy. Respiratory: effort is unlabored, no crackles, wheezes or rubs. Cardiovascular: regular, no murmur. No carotid bruits. No edema or varicosities. Pulses:    brachial radial   RIGHT 2 2   LEFT 2 2   GI: abdomen soft, nondistended, no organomegaly. Musculoskeletal: strength and tone normal.  Extremities: warm and pink. Skin: no dermatitis or ulceration. Neuro/psychiatric: grossly intact.       MEDICAL DECISION MAKING/TESTING        Vein mapping:   Impressions    Right Impression    There is no evidence of venous outflow obstruction noted in the right upper    extremity. There is no evidence of arterial inflow disease noted in the right upper    extremity. There is no evidence of deep or superficial venous thrombosis involving the    subclavian, axillary, brachial, cephalic or basilic veins. The basilic vein measures 1.5 mm. The velocity in the brachial artery is 63.9 cm/s and measures 3.7 mm. The right cephalic vein was hypoplastic, not visualized for multiple segments. Left Impression    There is no evidence of venous outflow obstruction noted in the left upper    extremity. There is no evidence of arterial inflow disease noted in the left upper    extremity. There is no evidence of deep or superficial venous thrombosis involving the    left subclavian, axillary, brachial, cephalic or basilic veins. The velocity in the subclavian artery is 60.3 cm/s, brachial artery is 54.1    cm/s measuring 3.9 mm, radial artery is 46.7 cm/s ( 2.3 mm) and the ulnar    artery is 32.3 cm/s (1.8 mm). The brachial vein measures 4.2 mm and the axillary vein measures 4.6 mm. Please refer to the table below for vein diameter measurements. Assessment:      Diagnosis Orders   1. Encounter regarding vascular access for dialysis for ESRD ([facility]       Veins of upper extremities too small for primary fistula. Recommendations/Plan:  Left upper arm dialysis graft. Discussed the following: The intended benefits: a working arteriovenous fistula (AVF) is one of the best ways to allow effective haemodialysis needed to replace failed kidney function to sustain life. Many types on arms and sometimes legs can be used; the best for you will be discussed with you. Alternative treatments: peritoneal dialysis-uses a plastic tube inserted into the abdomen through which fluid is run in and out.    Haemodialysis alternative - can be done through a plastic tube (line) into a major vein but is usually temporary because blockage and infections are common. It can also be done using a graft (a plastic tube embedded beneath the skin and connecting an artery and vein) but these, too are susceptible to infection and blockage. Serious or frequently occurring risks: none of these methods is durable and some fail immediately or within a year. We can sometimes get them going again by re-operating. Overall, around five to seven in ten are still working after two years and half after five years. The chances of success vary with the site of the AVF or graft and so do the complications. Up to one in ten patients experience a problem with wound healing which may require further surgery or prolonged dressings. Bleeding from the wound is rare because we take great care to avoid it but it may require a return to the operating theatre. Around one in ten patients develop a wound infection and need antibiotics. Sometimes too much blood flows through the fistula and not enough into the limb downstream, most commonly affecting the hand. This we call steal and it may require surgery to block the fistula despite which fingers are occasionally lost due to the poor blood supply. Over time some fistulas continue to enlarge (aneurysm) and may clot or bleed. Rarely nerves lying near the blood vessels are injured, causing numbness or weakness.         Wes Saab MD, FACS

## 2020-09-15 ENCOUNTER — PREP FOR PROCEDURE (OUTPATIENT)
Dept: VASCULAR SURGERY | Age: 70
End: 2020-09-15

## 2020-09-15 ENCOUNTER — TELEPHONE (OUTPATIENT)
Dept: VASCULAR SURGERY | Age: 70
End: 2020-09-15

## 2020-09-15 RX ORDER — DOXYCYCLINE HYCLATE 100 MG/1
100 CAPSULE ORAL 2 TIMES DAILY
COMMUNITY
End: 2020-10-08

## 2020-09-15 RX ORDER — MIRTAZAPINE 15 MG/1
15 TABLET, FILM COATED ORAL NIGHTLY
COMMUNITY

## 2020-09-15 RX ORDER — POTASSIUM CHLORIDE 750 MG/1
10 TABLET, FILM COATED, EXTENDED RELEASE ORAL DAILY
COMMUNITY

## 2020-09-16 ENCOUNTER — TELEPHONE (OUTPATIENT)
Dept: VASCULAR SURGERY | Age: 70
End: 2020-09-16

## 2020-09-16 ENCOUNTER — ANESTHESIA EVENT (OUTPATIENT)
Dept: OPERATING ROOM | Age: 70
End: 2020-09-16
Payer: MEDICAID

## 2020-09-16 NOTE — TELEPHONE ENCOUNTER
Called nursing home and left message for nurse regarding confirmation and reminder of patients surgery for tomorrow at [email or web address]

## 2020-09-17 ENCOUNTER — ANESTHESIA (OUTPATIENT)
Dept: OPERATING ROOM | Age: 70
End: 2020-09-17
Payer: MEDICAID

## 2020-09-17 ENCOUNTER — HOSPITAL ENCOUNTER (OUTPATIENT)
Age: 70
Discharge: HOME | End: 2020-09-17
Attending: SURGERY | Admitting: SURGERY
Payer: MEDICAID

## 2020-09-17 VITALS — DIASTOLIC BLOOD PRESSURE: 64 MMHG | SYSTOLIC BLOOD PRESSURE: 113 MMHG | OXYGEN SATURATION: 100 %

## 2020-09-17 VITALS
TEMPERATURE: 98.6 F | HEIGHT: 56 IN | HEART RATE: 100 BPM | OXYGEN SATURATION: 94 % | BODY MASS INDEX: 16.65 KG/M2 | SYSTOLIC BLOOD PRESSURE: 112 MMHG | RESPIRATION RATE: 19 BRPM | DIASTOLIC BLOOD PRESSURE: 66 MMHG | WEIGHT: 74 LBS

## 2020-09-17 PROBLEM — N18.6 END STAGE RENAL DISEASE (HCC): Status: ACTIVE | Noted: 2020-09-17

## 2020-09-17 LAB
ANION GAP SERPL CALCULATED.3IONS-SCNC: 6 MMOL/L (ref 3–16)
BUN BLDV-MCNC: 19 MG/DL (ref 7–20)
CALCIUM SERPL-MCNC: 8.9 MG/DL (ref 8.3–10.6)
CHLORIDE BLD-SCNC: 100 MMOL/L (ref 99–110)
CO2: 24 MMOL/L (ref 21–32)
CREAT SERPL-MCNC: 3 MG/DL (ref 0.6–1.2)
GFR AFRICAN AMERICAN: 19
GFR NON-AFRICAN AMERICAN: 15
GLUCOSE BLD-MCNC: 85 MG/DL (ref 70–99)
GLUCOSE BLD-MCNC: 90 MG/DL (ref 70–99)
HCT VFR BLD CALC: 31 % (ref 36–48)
HEMOGLOBIN: 9.9 G/DL (ref 12–16)
MCH RBC QN AUTO: 29.4 PG (ref 26–34)
MCHC RBC AUTO-ENTMCNC: 31.9 G/DL (ref 31–36)
MCV RBC AUTO: 92.2 FL (ref 80–100)
PDW BLD-RTO: 16.8 % (ref 12.4–15.4)
PERFORMED ON: NORMAL
PLATELET # BLD: 205 K/UL (ref 135–450)
PMV BLD AUTO: 7.5 FL (ref 5–10.5)
POTASSIUM REFLEX MAGNESIUM: 3.8 MMOL/L (ref 3.5–5.1)
RBC # BLD: 3.36 M/UL (ref 4–5.2)
SODIUM BLD-SCNC: 130 MMOL/L (ref 136–145)
WBC # BLD: 8.7 K/UL (ref 4–11)

## 2020-09-17 PROCEDURE — 3600000003 HC SURGERY LEVEL 3 BASE: Performed by: SURGERY

## 2020-09-17 PROCEDURE — 2580000003 HC RX 258: Performed by: SURGERY

## 2020-09-17 PROCEDURE — 3700000000 HC ANESTHESIA ATTENDED CARE: Performed by: SURGERY

## 2020-09-17 PROCEDURE — 6360000002 HC RX W HCPCS: Performed by: SURGERY

## 2020-09-17 PROCEDURE — C1757 CATH, THROMBECTOMY/EMBOLECT: HCPCS | Performed by: SURGERY

## 2020-09-17 PROCEDURE — 85027 COMPLETE CBC AUTOMATED: CPT

## 2020-09-17 PROCEDURE — 80048 BASIC METABOLIC PNL TOTAL CA: CPT

## 2020-09-17 PROCEDURE — 7100000001 HC PACU RECOVERY - ADDTL 15 MIN: Performed by: SURGERY

## 2020-09-17 PROCEDURE — 2580000003 HC RX 258: Performed by: ANESTHESIOLOGY

## 2020-09-17 PROCEDURE — 2709999900 HC NON-CHARGEABLE SUPPLY: Performed by: SURGERY

## 2020-09-17 PROCEDURE — 3600000013 HC SURGERY LEVEL 3 ADDTL 15MIN: Performed by: SURGERY

## 2020-09-17 PROCEDURE — 3700000001 HC ADD 15 MINUTES (ANESTHESIA): Performed by: SURGERY

## 2020-09-17 PROCEDURE — C1768 GRAFT, VASCULAR: HCPCS | Performed by: SURGERY

## 2020-09-17 PROCEDURE — 7100000010 HC PHASE II RECOVERY - FIRST 15 MIN: Performed by: SURGERY

## 2020-09-17 PROCEDURE — 2500000003 HC RX 250 WO HCPCS: Performed by: NURSE ANESTHETIST, CERTIFIED REGISTERED

## 2020-09-17 PROCEDURE — 6360000002 HC RX W HCPCS: Performed by: NURSE ANESTHETIST, CERTIFIED REGISTERED

## 2020-09-17 PROCEDURE — 7100000011 HC PHASE II RECOVERY - ADDTL 15 MIN: Performed by: SURGERY

## 2020-09-17 PROCEDURE — 7100000000 HC PACU RECOVERY - FIRST 15 MIN: Performed by: SURGERY

## 2020-09-17 PROCEDURE — 36830 ARTERY-VEIN NONAUTOGRAFT: CPT | Performed by: SURGERY

## 2020-09-17 DEVICE — GRAFT VASC L45CM DIA4-7MM PTFE CBAS HEP SURF STD WALLED: Type: IMPLANTABLE DEVICE | Status: FUNCTIONAL

## 2020-09-17 RX ORDER — ONDANSETRON 2 MG/ML
INJECTION INTRAMUSCULAR; INTRAVENOUS PRN
Status: DISCONTINUED | OUTPATIENT
Start: 2020-09-17 | End: 2020-09-17 | Stop reason: SDUPTHER

## 2020-09-17 RX ORDER — OXYCODONE HYDROCHLORIDE AND ACETAMINOPHEN 5; 325 MG/1; MG/1
2 TABLET ORAL PRN
Status: DISCONTINUED | OUTPATIENT
Start: 2020-09-17 | End: 2020-09-17 | Stop reason: HOSPADM

## 2020-09-17 RX ORDER — LABETALOL HYDROCHLORIDE 5 MG/ML
5 INJECTION, SOLUTION INTRAVENOUS EVERY 10 MIN PRN
Status: DISCONTINUED | OUTPATIENT
Start: 2020-09-17 | End: 2020-09-17 | Stop reason: HOSPADM

## 2020-09-17 RX ORDER — SODIUM CHLORIDE 9 MG/ML
INJECTION, SOLUTION INTRAVENOUS CONTINUOUS
Status: DISCONTINUED | OUTPATIENT
Start: 2020-09-17 | End: 2020-09-17 | Stop reason: HOSPADM

## 2020-09-17 RX ORDER — ROCURONIUM BROMIDE 10 MG/ML
INJECTION, SOLUTION INTRAVENOUS PRN
Status: DISCONTINUED | OUTPATIENT
Start: 2020-09-17 | End: 2020-09-17 | Stop reason: SDUPTHER

## 2020-09-17 RX ORDER — FENTANYL CITRATE 50 UG/ML
INJECTION, SOLUTION INTRAMUSCULAR; INTRAVENOUS PRN
Status: DISCONTINUED | OUTPATIENT
Start: 2020-09-17 | End: 2020-09-17 | Stop reason: SDUPTHER

## 2020-09-17 RX ORDER — ONDANSETRON 2 MG/ML
4 INJECTION INTRAMUSCULAR; INTRAVENOUS PRN
Status: DISCONTINUED | OUTPATIENT
Start: 2020-09-17 | End: 2020-09-17 | Stop reason: HOSPADM

## 2020-09-17 RX ORDER — DIPHENHYDRAMINE HYDROCHLORIDE 50 MG/ML
12.5 INJECTION INTRAMUSCULAR; INTRAVENOUS
Status: DISCONTINUED | OUTPATIENT
Start: 2020-09-17 | End: 2020-09-17 | Stop reason: HOSPADM

## 2020-09-17 RX ORDER — HYDRALAZINE HYDROCHLORIDE 20 MG/ML
5 INJECTION INTRAMUSCULAR; INTRAVENOUS EVERY 10 MIN PRN
Status: DISCONTINUED | OUTPATIENT
Start: 2020-09-17 | End: 2020-09-17 | Stop reason: HOSPADM

## 2020-09-17 RX ORDER — OXYCODONE HYDROCHLORIDE AND ACETAMINOPHEN 5; 325 MG/1; MG/1
1 TABLET ORAL PRN
Status: DISCONTINUED | OUTPATIENT
Start: 2020-09-17 | End: 2020-09-17 | Stop reason: HOSPADM

## 2020-09-17 RX ORDER — MORPHINE SULFATE 2 MG/ML
1 INJECTION, SOLUTION INTRAMUSCULAR; INTRAVENOUS EVERY 5 MIN PRN
Status: DISCONTINUED | OUTPATIENT
Start: 2020-09-17 | End: 2020-09-17 | Stop reason: HOSPADM

## 2020-09-17 RX ORDER — LIDOCAINE HYDROCHLORIDE 20 MG/ML
INJECTION, SOLUTION INFILTRATION; PERINEURAL PRN
Status: DISCONTINUED | OUTPATIENT
Start: 2020-09-17 | End: 2020-09-17 | Stop reason: SDUPTHER

## 2020-09-17 RX ORDER — PROMETHAZINE HYDROCHLORIDE 25 MG/ML
6.25 INJECTION, SOLUTION INTRAMUSCULAR; INTRAVENOUS
Status: DISCONTINUED | OUTPATIENT
Start: 2020-09-17 | End: 2020-09-17 | Stop reason: HOSPADM

## 2020-09-17 RX ORDER — MORPHINE SULFATE 2 MG/ML
2 INJECTION, SOLUTION INTRAMUSCULAR; INTRAVENOUS EVERY 5 MIN PRN
Status: DISCONTINUED | OUTPATIENT
Start: 2020-09-17 | End: 2020-09-17 | Stop reason: HOSPADM

## 2020-09-17 RX ORDER — HYDROCODONE BITARTRATE AND ACETAMINOPHEN 5; 325 MG/1; MG/1
1 TABLET ORAL EVERY 4 HOURS PRN
Qty: 30 TABLET | Refills: 0 | Status: SHIPPED | OUTPATIENT
Start: 2020-09-17 | End: 2020-09-22

## 2020-09-17 RX ORDER — MEPERIDINE HYDROCHLORIDE 50 MG/ML
12.5 INJECTION INTRAMUSCULAR; INTRAVENOUS; SUBCUTANEOUS EVERY 5 MIN PRN
Status: DISCONTINUED | OUTPATIENT
Start: 2020-09-17 | End: 2020-09-17 | Stop reason: HOSPADM

## 2020-09-17 RX ORDER — PROPOFOL 10 MG/ML
INJECTION, EMULSION INTRAVENOUS PRN
Status: DISCONTINUED | OUTPATIENT
Start: 2020-09-17 | End: 2020-09-17 | Stop reason: SDUPTHER

## 2020-09-17 RX ADMIN — PROPOFOL 70 MG: 10 INJECTION, EMULSION INTRAVENOUS at 10:42

## 2020-09-17 RX ADMIN — FENTANYL CITRATE 25 MCG: 50 INJECTION INTRAMUSCULAR; INTRAVENOUS at 10:37

## 2020-09-17 RX ADMIN — ONDANSETRON 4 MG: 2 INJECTION INTRAMUSCULAR; INTRAVENOUS at 11:37

## 2020-09-17 RX ADMIN — SODIUM CHLORIDE: 9 INJECTION, SOLUTION INTRAVENOUS at 10:20

## 2020-09-17 RX ADMIN — ROCURONIUM BROMIDE 20 MG: 10 SOLUTION INTRAVENOUS at 10:42

## 2020-09-17 RX ADMIN — FENTANYL CITRATE 25 MCG: 50 INJECTION INTRAMUSCULAR; INTRAVENOUS at 11:38

## 2020-09-17 RX ADMIN — SUGAMMADEX 75 MG: 100 INJECTION, SOLUTION INTRAVENOUS at 11:37

## 2020-09-17 RX ADMIN — LIDOCAINE HYDROCHLORIDE 60 MG: 20 INJECTION, SOLUTION INFILTRATION; PERINEURAL at 10:42

## 2020-09-17 RX ADMIN — CEFAZOLIN 1 G: 1 INJECTION, POWDER, FOR SOLUTION INTRAMUSCULAR; INTRAVENOUS at 10:34

## 2020-09-17 ASSESSMENT — PULMONARY FUNCTION TESTS
PIF_VALUE: 16
PIF_VALUE: 16
PIF_VALUE: 2
PIF_VALUE: 16
PIF_VALUE: 15
PIF_VALUE: 15
PIF_VALUE: 1
PIF_VALUE: 16
PIF_VALUE: 14
PIF_VALUE: 2
PIF_VALUE: 15
PIF_VALUE: 14
PIF_VALUE: 16
PIF_VALUE: 1
PIF_VALUE: 16
PIF_VALUE: 12
PIF_VALUE: 1
PIF_VALUE: 16
PIF_VALUE: 16
PIF_VALUE: 1
PIF_VALUE: 16
PIF_VALUE: 16
PIF_VALUE: 3
PIF_VALUE: 17
PIF_VALUE: 1
PIF_VALUE: 3
PIF_VALUE: 3
PIF_VALUE: 15
PIF_VALUE: 16
PIF_VALUE: 2
PIF_VALUE: 16
PIF_VALUE: 1
PIF_VALUE: 15
PIF_VALUE: 14
PIF_VALUE: 1
PIF_VALUE: 14
PIF_VALUE: 2
PIF_VALUE: 17
PIF_VALUE: 15
PIF_VALUE: 1
PIF_VALUE: 15
PIF_VALUE: 15
PIF_VALUE: 2
PIF_VALUE: 15
PIF_VALUE: 3
PIF_VALUE: 2
PIF_VALUE: 16
PIF_VALUE: 3
PIF_VALUE: 15
PIF_VALUE: 15
PIF_VALUE: 2
PIF_VALUE: 16
PIF_VALUE: 2
PIF_VALUE: 16
PIF_VALUE: 2
PIF_VALUE: 15
PIF_VALUE: 2
PIF_VALUE: 3
PIF_VALUE: 15
PIF_VALUE: 16
PIF_VALUE: 2
PIF_VALUE: 1
PIF_VALUE: 2
PIF_VALUE: 11
PIF_VALUE: 3
PIF_VALUE: 2
PIF_VALUE: 1
PIF_VALUE: 16
PIF_VALUE: 16
PIF_VALUE: 17
PIF_VALUE: 15
PIF_VALUE: 16

## 2020-09-17 ASSESSMENT — PAIN - FUNCTIONAL ASSESSMENT: PAIN_FUNCTIONAL_ASSESSMENT: 0-10

## 2020-09-17 NOTE — BRIEF OP NOTE
Brief Postoperative Note      Patient: Sam Riddle  YOB: 1950  MRN: [id number]    Date of Procedure: 9/17/2020    Pre-Op Diagnosis: END STAGE RENAL DISEASE    Post-Op Diagnosis: Same       Procedure(s):  LEFT UPPER EXTREMITY DIALYSIS GRAFT    Surgeon(s):  Valerie Abdi MD    Assistant:  Surgical Assistant: Sergio Rosario    Anesthesia: General    Estimated Blood Loss (mL): Minimal    Complications: None    Specimens:   * No specimens in log *    Implants:  Implant Name Type Inv.  Item Serial No.  Lot No. LRB No. Used Action   GRAFT VASC STD WALL STRTCH [id number] Vascular/Graft/Patch/Filter GRAFT VASC STD WALL STRTCH [id number]   [employer] [id number] Left 1 Implanted         Drains: * No LDAs found *        Electronically signed by Valerie Abdi MD on 9/17/2020 at 11:37 AM

## 2020-09-17 NOTE — ANESTHESIA POSTPROCEDURE EVALUATION
Department of Anesthesiology  Postprocedure Note    Patient: Gianna Coates  MRN: [id number]  YOB: 1950  Date of evaluation: 9/17/2020  Time:  12:28 PM     Procedure Summary     Date:  09/17/20 Room / Location:  [place] [address] ([facility]) / [facility]    Anesthesia Start:  1030 Anesthesia Stop:  1120    Procedure:  LEFT UPPER EXTREMITY DIALYSIS GRAFT (Left ) Diagnosis:       End stage renal disease ([address]      (END STAGE RENAL DISEASE)    Surgeon: Jimenez Ladd MD Responsible Provider:  Autumn Barnhart MD    Anesthesia Type:  general ASA Status:  3          Anesthesia Type: general    Mike Phase I: Mike Score: 8    Mike Phase II:      Last vitals: Reviewed and per EMR flowsheets.        Anesthesia Post Evaluation    Patient location during evaluation: PACU  Patient participation: complete - patient participated  Level of consciousness: awake and alert  Pain score: 0  Airway patency: patent  Nausea & Vomiting: no nausea and no vomiting  Complications: no  Cardiovascular status: blood pressure returned to baseline  Respiratory status: acceptable  Hydration status: stable

## 2020-09-17 NOTE — ANESTHESIA PRE PROCEDURE
Department of Anesthesiology  Preprocedure Note       Name:  Daniel Frankel   Age:  72 y.o.  :  1950                                          MRN:  [id number]         Date:  2020      Surgeon: Bharat Folrence):  Dina Contreras MD    Procedure: Procedure(s):  LEFT UPPER EXTREMITY DIALYSIS GRAFT    Medications prior to admission:   Prior to Admission medications    Medication Sig Start Date End Date Taking?  Authorizing Provider   doxycycline hyclate (VIBRAMYCIN) 100 MG capsule Take 100 mg by mouth 2 times daily   Yes Historical Provider, MD   mirtazapine (REMERON) 15 MG tablet Take 15 mg by mouth nightly   Yes Historical Provider, MD   Potassium Phosphate Monobasic (K-PHOS PO) Take 250 mg by mouth daily   Yes Historical Provider, MD   potassium chloride (KLOR-CON) 10 MEQ extended release tablet Take 10 mEq by mouth daily  dialysis days only   Yes Historical Provider, MD   lactulose (CHRONULAC) 10 GM/15ML solution Take 20 g by mouth daily as needed   Yes Historical Provider, MD   Nutritional Supplements (RESOURCE 2.0 PO) Take 120 mLs by mouth 3 times daily   Yes Historical Provider, MD   levothyroxine (SYNTHROID) 75 MCG tablet Take 75 mcg by mouth Daily   Yes Historical Provider, MD   ARIPiprazole (ABILIFY) 10 MG tablet Take 10 mg by mouth daily   Yes Historical Provider, MD   aspirin 81 MG EC tablet Take 81 mg by mouth daily   Yes Historical Provider, MD   docusate sodium (COLACE) 100 MG capsule Take 100 mg by mouth 2 times daily as needed    Yes Historical Provider, MD   ferrous sulfate (FE TABS 325) 325 (65 Fe) MG EC tablet Take 325 mg by mouth 2 times daily   Yes Historical Provider, MD   isosorbide mononitrate (IMDUR) 30 MG extended release tablet Take 30 mg by mouth daily   Yes Historical Provider, MD   levETIRAcetam (KEPPRA) 500 MG tablet Take 500 mg by mouth 2 times daily   Yes Historical Provider, MD   raloxifene (EVISTA) 60 MG tablet Take 60 mg by mouth daily   Yes Historical Provider, MD   senna (SENOKOT) 8.6 MG tablet Take 1 tablet by mouth as needed    Yes Historical Provider, MD   vitamin C (ASCORBIC ACID) 500 MG tablet Take 500 mg by mouth daily   Yes Historical Provider, MD   sertraline (ZOLOFT) 50 MG tablet Take 50 mg by mouth daily   Yes Historical Provider, MD   pravastatin (PRAVACHOL) 40 MG tablet Take 40 mg by mouth daily   Yes Historical Provider, MD   acetaminophen (TYLENOL) 500 MG tablet Take 500 mg by mouth every 4 hours as needed for Pain   Yes Historical Provider, MD   midodrine (PROAMATINE) 5 MG tablet Take 1 tablet by mouth as needed (HD: Give pre and mid tx for Sbp <100) 7/28/20   Mariangel Jiménez MD   bismuth subsalicylate (PEPTO BISMOL) 525 MG/15ML suspension Take 30 mLs by mouth every 8 hours as needed for Indigestion    Historical Provider, MD   ondansetron (ZOFRAN) 4 MG tablet Take 4 mg by mouth every 6 hours as needed for Nausea or Vomiting    Historical Provider, MD       Current medications:    Current Facility-Administered Medications   Medication Dose Route Frequency Provider Last Rate Last Dose    0.9 % sodium chloride infusion   Intravenous Continuous Al Maldonado MD        lidocaine 1 % (PF) injection 0.1 mL  0.1 mL Intradermal Once PRN Al Maldonado MD        ceFAZolin (ANCEF) 1 g in dextrose 5 % 50 mL IVPB (mini-bag)  1 g Intravenous On Call to Mary Kay Saba MD           Allergies:     Allergies   Allergen Reactions    Penicillins Rash       Problem List:    Patient Active Problem List   Diagnosis Code    JEN (acute kidney injury) ([facility] N17.9    Seizures ([facility] R56.9    Acquired hypothyroidism E03.9    GI bleed K92.2    Proctitis K62.89    Acute blood loss anemia B21    Metabolic acidosis F53.7    Anemia D64.9    Essential hypertension I10    End stage renal disease (HCC) N18.6       Past Medical History:        Diagnosis Date    Anemia     Cognitive communication deficit     Convulsions ([facility]     Depressive disorder     Muscle wasting     Osteomyelitis of lumbar spine ([address]     Pacemaker     medtronic    Sepsis [facility])        Past Surgical History:        Procedure Laterality Date    COLONOSCOPY N/A 7/22/2020    COLONOSCOPY WITH BIOPSY performed by Nahum Altamirano MD at [address] [facility] IR NONTUNNELED VASCULAR CATHETER  7/24/2020    IR NONTUNNELED VASCULAR CATHETER 7/24/2020 [facility] SPECIAL PROCEDURES    IR TUNNELED CATHETER PLACEMENT GREATER THAN 5 YEARS  7/28/2020    IR TUNNELED CATHETER PLACEMENT GREATER THAN 5 YEARS 7/28/2020 [address] SPECIAL PROCEDURES    UPPER GASTROINTESTINAL ENDOSCOPY N/A 7/22/2020    EGD BIOPSY performed by Nahum Altamirano MD at [address] History:    Social History     Tobacco Use    Smoking status: Never Smoker    Smokeless tobacco: Never Used   Substance Use Topics    Alcohol use: Not Currently                                Counseling given: Not Answered      Vital Signs (Current):   Vitals:    09/15/20 1034 09/17/20 0732   BP:  (!) 98/54   Pulse:  92   Resp:  16   Temp:  98.6 °F (37 °C)   TempSrc:  Temporal   SpO2:  99%   Weight: 74 lb (33.6 kg) 74 lb (33.6 kg)   Height: 4' 9\" (1.448 m) 4' 8\" (1.422 m)                                              BP Readings from Last 3 Encounters:   09/17/20 (!) 98/54   09/11/20 90/60   07/28/20 (!) 142/89       NPO Status: Time of last liquid consumption: 1900                        Time of last solid consumption: 1900                        Date of last liquid consumption: 09/16/20                        Date of last solid food consumption: 09/16/20    BMI:   Wt Readings from Last 3 Encounters:   09/17/20 74 lb (33.6 kg)   09/11/20 95 lb (43.1 kg)   07/28/20 96 lb 12.8 oz (43.9 kg)     Body mass index is 16.59 kg/m².     CBC:   Lab Results   Component Value Date    WBC 8.7 09/17/2020    RBC 3.36 09/17/2020    HGB 9.9 09/17/2020    HCT 31.0 09/17/2020    MCV 92.2 09/17/2020    RDW 16.8 09/17/2020     09/17/2020       CMP:   Lab Results   Component Value Date     09/17/2020    K 3.8 09/17/2020     09/17/2020    CO2 24 09/17/2020    BUN 19 09/17/2020    CREATININE 3.0 09/17/2020    GFRAA 19 09/17/2020    AGRATIO 0.8 07/17/2020    LABGLOM 15 09/17/2020    GLUCOSE 90 09/17/2020    PROT 5.5 07/17/2020    CALCIUM 8.9 09/17/2020    BILITOT <0.2 07/17/2020    ALKPHOS 147 07/17/2020    AST 19 07/17/2020    ALT 11 07/17/2020       POC Tests:   Recent Labs     09/17/20  0732   POCGLU 85       Coags:   Lab Results   Component Value Date    PROTIME 13.9 07/28/2020    INR 1.20 07/28/2020    APTT 35.4 07/24/2020       HCG (If Applicable): No results found for: PREGTESTUR, PREGSERUM, HCG, HCGQUANT     ABGs: No results found for: PHART, PO2ART, PAW3UAU, ZBJ6WBA, BEART, O4RCASGN     Type & Screen (If Applicable):  No results found for: LABABO, LABRH    Drug/Infectious Status (If Applicable):  No results found for: HIV, HEPCAB    COVID-19 Screening (If Applicable):   Lab Results   Component Value Date    COVID19 Not Detected 07/17/2020         Anesthesia Evaluation  Patient summary reviewed and Nursing notes reviewed  Airway: Mallampati: IV  TM distance: <3 FB   Neck ROM: limited  Mouth opening: < 3 FB Dental:    (+) edentulous      Pulmonary:Negative Pulmonary ROS and normal exam  breath sounds clear to auscultation                             Cardiovascular:    (+) hypertension: mild, pacemaker:,         Rhythm: regular  Rate: normal                    Neuro/Psych:   (+) seizures: well controlled, depression/anxiety             GI/Hepatic/Renal:   (+) renal disease: ARF and ESRD,           Endo/Other:    (+) hypothyroidism::., .                 Abdominal:           Vascular: negative vascular ROS. Anesthesia Plan      general     ASA 3       Induction: intravenous. MIPS: Postoperative opioids intended and Prophylactic antiemetics administered.   Anesthetic plan and risks discussed with patient. Plan discussed with CRNA.                   Toby Salazar MD   9/17/2020

## 2020-09-18 ENCOUNTER — ANESTHESIA EVENT (OUTPATIENT)
Dept: OPERATING ROOM | Age: 70
End: 2020-09-18
Payer: MEDICAID

## 2020-09-18 ENCOUNTER — OFFICE VISIT (OUTPATIENT)
Dept: VASCULAR SURGERY | Age: 70
End: 2020-09-18

## 2020-09-18 VITALS
HEIGHT: 55 IN | DIASTOLIC BLOOD PRESSURE: 40 MMHG | WEIGHT: 95 LBS | BODY MASS INDEX: 21.98 KG/M2 | SYSTOLIC BLOOD PRESSURE: 80 MMHG

## 2020-09-18 PROCEDURE — 99024 POSTOP FOLLOW-UP VISIT: CPT | Performed by: SURGERY

## 2020-09-18 NOTE — OP NOTE
[address] [id number]                                OPERATIVE REPORT    PATIENT NAME: Brenda Hyman                       :        1950  MED REC NO:   [id number]                          ROOM:  ACCOUNT NO:   [de-identified]                           ADMIT DATE: 2020  PROVIDER:     Francine Avila MD    DATE OF PROCEDURE:  2020    PREOPERATIVE DIAGNOSIS:  End-stage renal disease on hemodialysis. POSTOPERATIVE DIAGNOSIS:  End-stage renal disease on hemodialysis. PROCEDURE:  Left upper arm brachial artery to axillary vein dialysis  graft. ANESTHESIA:  General endotracheal.    INDICATIONS:  The patient is a 67-year-old female with end-stage renal  disease on hemodialysis. Vein mapping showed her veins to be too small  to be usable for a primary fistula. The patient is thus brought to the  operating room to undergo placement of dialysis graft. DESCRIPTION OF PROCEDURE:  The patient is brought to the operating room,  placed in supine position. General endotracheal anesthesia induced,  ultrasound was then performed of the left upper arm. The axillary vein  was marked near the axilla and the brachial artery was marked at the  antecubital region. The arm was prepped and draped in sterile fashion. An incision was made just above the antecubital crease. The brachial  artery was mobilized for several centimeters, encircled with vessel  loops. A second incision was made in the axilla and the axillary vein  was identified and mobilized for several centimeters and encircled with  vessel loops. Using a Julia with tunneler, a 4 mm to 7 mm taper  dialysis graft was tunneled between these two incisions. The tapered  end was at the brachial artery site. Flow in the brachial artery was  occluded. Using the vessel loops, a small arteriotomy was made in the  brachial artery, widened using a 4 mm aortic punch.   The graft was then  sewn to the brachial artery in an end-to-side fashion using running 6-0  Prolene sutures. After completing the anastomosis, the vessel loops in  the brachial artery were released. There was excellent arterial outflow  through the graft. The graft was then clamped. The vessel loops around  the axillary vein were constricted. A venotomy was made. The graft was  cut to the appropriate length and angle and the distal venous  anastomosis created using running 6-0 Prolene sutures as well. Clamps  were then released. There was an excellent bruit heard with the Doppler  at the antecubital region. The distal brachial artery signals were  somewhat weak, therefore I reoccluded the brachial artery and made a  small puncture in the graft just distal to the arterial anastomosis. A  balloon catheter was placed proximally and distally in the brachial  artery and inflated quite easily across the anastomosis ensuring this  was widely patent both proximally and distally. The incision and the  graft was closed using 6-0 Prolene suture. Flow was reestablished. There were improved signals at the distal brachial artery and remained a  good bruit at the venous end. The hand was somewhat cooler with weak  radial Doppler signal.  The incisions were irrigated with antibiotic  saline solution and closed using 3-0 Vicryl subcutaneous sutures and 4-0  Monocryl subcuticular sutures. The wounds were dressed with Dermabond. There were no complications. At the end of procedure, sponge, needle,  instrument counts were correct. Estimated blood loss was less than 50  mL. The patient was extubated in the operating room and transferred to  the recovery area in a stable condition.         Candice Vo MD    D: 09/17/2020 18:51:12       T: 09/17/2020 19:00:11     [email or web address]  Job#: [id number]     Doc#: [id number]    CC:

## 2020-09-18 NOTE — PROGRESS NOTES
Outpatient Follow Up Note    Danette Blocker is 72 y.o. female who presents today for  follow up regarding:    Chief Complaint   Patient presents with    Post-Op Check     patient is sp left upper extremity dialsys graft that is clotted. pamrl        Patient underwent Brachial Axillary graft yesterday. Dialysis nurse today noted no bruit or thrill. Patient denies pain in arm or hand. Past Medical History:   Diagnosis Date    Anemia     Cognitive communication deficit     [facility]ulsions ([facility]     Depressive disorder     ESRD (end stage renal disease) ([facility]     Hemodialysis patient ([facility]     Hyperlipidemia     Muscle wasting     Osteomyelitis of lumbar spine ([facility]     Pacemaker     medtronic    Sepsis ([facility]     Thyroid disease        Past Surgical History:   Procedure Laterality Date    COLONOSCOPY N/A 7/22/2020    COLONOSCOPY WITH BIOPSY performed by Griselda Recinos MD at [address] [place] [address] Left 9/17/2020    LEFT UPPER EXTREMITY DIALYSIS GRAFT performed by Ariadna Park MD at [facility] IR NONTUNNELED VASCULAR CATHETER  7/24/2020    IR NONTUNNELED VASCULAR CATHETER 7/24/2020 [facility]    IR TUNNELED CATHETER PLACEMENT GREATER THAN 5 YEARS  7/28/2020    IR TUNNELED CATHETER PLACEMENT GREATER THAN 5 YEARS 7/28/2020 [facility]    UPPER GASTROINTESTINAL ENDOSCOPY N/A 7/22/2020    EGD BIOPSY performed by Griselda Recinos MD at [place] [address]. History:    Social History     Tobacco Use   Smoking Status Never Smoker   Smokeless Tobacco Never Used     Current Medications:  Current Outpatient Medications   Medication Sig Dispense Refill    HYDROcodone-acetaminophen (NORCO) 5-325 MG per tablet Take 1 tablet by mouth every 4 hours as needed for Pain for up to 5 days. Intended supply: 5 days.  Take lowest dose possible to manage pain 30 tablet 0    doxycycline hyclate (VIBRAMYCIN) 100 MG capsule Take 100 mg by mouth 2 times daily      mirtazapine (REMERON) 15 MG tablet Take 15 mg by mouth nightly      Potassium Phosphate Monobasic (K-PHOS PO) Take 250 mg by mouth daily      potassium chloride (KLOR-CON) 10 MEQ extended release tablet Take 10 mEq by mouth daily Monday Wednesday Friday dialysis days only      midodrine (PROAMATINE) 5 MG tablet Take 1 tablet by mouth as needed (HD: Give pre and mid tx for Sbp <100) 90 tablet 3    bismuth subsalicylate (PEPTO BISMOL) 525 MG/15ML suspension Take 30 mLs by mouth every 8 hours as needed for Indigestion      lactulose (CHRONULAC) 10 GM/15ML solution Take 20 g by mouth daily as needed      Nutritional Supplements (RESOURCE 2.0 PO) Take 120 mLs by mouth 3 times daily      levothyroxine (SYNTHROID) 75 MCG tablet Take 75 mcg by mouth Daily      ARIPiprazole (ABILIFY) 10 MG tablet Take 10 mg by mouth daily      aspirin 81 MG EC tablet Take 81 mg by mouth daily      docusate sodium (COLACE) 100 MG capsule Take 100 mg by mouth 2 times daily as needed       ferrous sulfate (FE TABS 325) 325 (65 Fe) MG EC tablet Take 325 mg by mouth 2 times daily      isosorbide mononitrate (IMDUR) 30 MG extended release tablet Take 30 mg by mouth daily      levETIRAcetam (KEPPRA) 500 MG tablet Take 500 mg by mouth 2 times daily      raloxifene (EVISTA) 60 MG tablet Take 60 mg by mouth daily      senna (SENOKOT) 8.6 MG tablet Take 1 tablet by mouth as needed       vitamin C (ASCORBIC ACID) 500 MG tablet Take 500 mg by mouth daily      sertraline (ZOLOFT) 50 MG tablet Take 50 mg by mouth daily      pravastatin (PRAVACHOL) 40 MG tablet Take 40 mg by mouth daily      acetaminophen (TYLENOL) 500 MG tablet Take 500 mg by mouth every 4 hours as needed for Pain      ondansetron (ZOFRAN) 4 MG tablet Take 4 mg by mouth every 6 hours as needed for Nausea or Vomiting       No current facility-administered medications for this visit.       Allergies:  Penicillins    REVIEW OF SYSTEMS:   A 14 point review of systems was completed. Pertinent positives identified in the HPI, all other review of systems negative. Objective:   PHYSICAL EXAM:        VITALS:  BP (!) 80/40 (Site: Right Upper Arm)   Ht 4' (1.219 m)   Wt 95 lb (43.1 kg)   BMI 28.99 kg/m²   CONSTITUTIONAL: Cooperative, no apparent distress, and appears well nourished / developed  NEUROLOGIC:  Awake and oriented to person, place and time. PSYCH: Calm affect. SKIN: Warm and dry. HEENT: Sclera non-icteric, normocephalic, neck supple, normal carotid pulses with no bruits and thyroid normal size. LUNGS:  No increased work of breathing and clear to auscultation, no crackles or wheezing  CARDIOVASCULAR:  Regular rate and rhythm with no murmurs, gallops, rubs, or abnormal heart sounds, normal PMI. Pulses:    brachial radial   LEFT 1 -     ABDOMEN:  Normal bowel sounds, non-distended and non-tender to palpation  EXT: No edema, no calf tenderness. Incisions intact  No thrill or bruit. Assessment:      Diagnosis Orders   1. Encounter regarding vascular access for dialysis for ESRD [facility])         One day after graft placement it is clotted. Artery noted to be very small. Plan:     Return to OR 9/21 for thrombectomy, angiogram, possible revision. I discussed with patient if graft does not remain open after second attempt then would not revise again. Procedure, risks, benefits, and alternatives discussed and understood.          Natalie Modi MD, FACS

## 2020-09-21 ENCOUNTER — HOSPITAL ENCOUNTER (OUTPATIENT)
Age: 70
Setting detail: OUTPATIENT SURGERY
Discharge: HOME | End: 2020-09-21
Attending: SURGERY | Admitting: SURGERY
Payer: MEDICAID

## 2020-09-21 ENCOUNTER — ANESTHESIA (OUTPATIENT)
Dept: OPERATING ROOM | Age: 70
End: 2020-09-21
Payer: MEDICAID

## 2020-09-21 VITALS
RESPIRATION RATE: 15 BRPM | OXYGEN SATURATION: 100 % | HEIGHT: 58 IN | DIASTOLIC BLOOD PRESSURE: 82 MMHG | HEART RATE: 101 BPM | WEIGHT: 70.56 LBS | SYSTOLIC BLOOD PRESSURE: 129 MMHG | TEMPERATURE: 96.9 F | BODY MASS INDEX: 14.81 KG/M2

## 2020-09-21 VITALS
RESPIRATION RATE: 33 BRPM | DIASTOLIC BLOOD PRESSURE: 76 MMHG | TEMPERATURE: 97.1 F | SYSTOLIC BLOOD PRESSURE: 125 MMHG | OXYGEN SATURATION: 100 %

## 2020-09-21 LAB
ANION GAP SERPL CALCULATED.3IONS-SCNC: 14 MMOL/L (ref 3–16)
APTT: 31.4 SEC (ref 24.2–36.2)
BUN BLDV-MCNC: 44 MG/DL (ref 7–20)
CALCIUM SERPL-MCNC: 8.2 MG/DL (ref 8.3–10.6)
CHLORIDE BLD-SCNC: 109 MMOL/L (ref 99–110)
CO2: 21 MMOL/L (ref 21–32)
CREAT SERPL-MCNC: 6.3 MG/DL (ref 0.6–1.2)
GFR AFRICAN AMERICAN: 8
GFR NON-AFRICAN AMERICAN: 7
GLUCOSE BLD-MCNC: 99 MG/DL (ref 70–99)
HCT VFR BLD CALC: 29.2 % (ref 36–48)
HEMOGLOBIN: 9.4 G/DL (ref 12–16)
INR BLD: 1.53 (ref 0.86–1.14)
MAGNESIUM: 2 MG/DL (ref 1.8–2.4)
MCH RBC QN AUTO: 29.8 PG (ref 26–34)
MCHC RBC AUTO-ENTMCNC: 32.2 G/DL (ref 31–36)
MCV RBC AUTO: 92.5 FL (ref 80–100)
PDW BLD-RTO: 17.2 % (ref 12.4–15.4)
PLATELET # BLD: 178 K/UL (ref 135–450)
PMV BLD AUTO: 7.6 FL (ref 5–10.5)
POTASSIUM REFLEX MAGNESIUM: 3.4 MMOL/L (ref 3.5–5.1)
PROTHROMBIN TIME: 17.5 SEC (ref 10–13.2)
RBC # BLD: 3.16 M/UL (ref 4–5.2)
SARS-COV-2, NAAT: NOT DETECTED
SODIUM BLD-SCNC: 144 MMOL/L (ref 136–145)
WBC # BLD: 9.3 K/UL (ref 4–11)

## 2020-09-21 PROCEDURE — 6360000002 HC RX W HCPCS: Performed by: NURSE ANESTHETIST, CERTIFIED REGISTERED

## 2020-09-21 PROCEDURE — 7100000011 HC PHASE II RECOVERY - ADDTL 15 MIN: Performed by: SURGERY

## 2020-09-21 PROCEDURE — 85027 COMPLETE CBC AUTOMATED: CPT

## 2020-09-21 PROCEDURE — 2580000003 HC RX 258: Performed by: ANESTHESIOLOGY

## 2020-09-21 PROCEDURE — 6360000002 HC RX W HCPCS: Performed by: SURGERY

## 2020-09-21 PROCEDURE — 85730 THROMBOPLASTIN TIME PARTIAL: CPT

## 2020-09-21 PROCEDURE — 3700000000 HC ANESTHESIA ATTENDED CARE: Performed by: SURGERY

## 2020-09-21 PROCEDURE — 2709999900 HC NON-CHARGEABLE SUPPLY: Performed by: SURGERY

## 2020-09-21 PROCEDURE — 36833 AV FISTULA REVISION: CPT | Performed by: SURGERY

## 2020-09-21 PROCEDURE — 3700000001 HC ADD 15 MINUTES (ANESTHESIA): Performed by: SURGERY

## 2020-09-21 PROCEDURE — 7100000001 HC PACU RECOVERY - ADDTL 15 MIN: Performed by: SURGERY

## 2020-09-21 PROCEDURE — C1757 CATH, THROMBECTOMY/EMBOLECT: HCPCS | Performed by: SURGERY

## 2020-09-21 PROCEDURE — 75710 ARTERY X-RAYS ARM/LEG: CPT | Performed by: SURGERY

## 2020-09-21 PROCEDURE — 2580000003 HC RX 258: Performed by: SURGERY

## 2020-09-21 PROCEDURE — 6360000004 HC RX CONTRAST MEDICATION: Performed by: SURGERY

## 2020-09-21 PROCEDURE — 2500000003 HC RX 250 WO HCPCS: Performed by: SURGERY

## 2020-09-21 PROCEDURE — 85610 PROTHROMBIN TIME: CPT

## 2020-09-21 PROCEDURE — 2500000003 HC RX 250 WO HCPCS: Performed by: ANESTHESIOLOGY

## 2020-09-21 PROCEDURE — 7100000010 HC PHASE II RECOVERY - FIRST 15 MIN: Performed by: SURGERY

## 2020-09-21 PROCEDURE — 83735 ASSAY OF MAGNESIUM: CPT

## 2020-09-21 PROCEDURE — 3600000005 HC SURGERY LEVEL 5 BASE: Performed by: SURGERY

## 2020-09-21 PROCEDURE — 36415 COLL VENOUS BLD VENIPUNCTURE: CPT

## 2020-09-21 PROCEDURE — 3600000015 HC SURGERY LEVEL 5 ADDTL 15MIN: Performed by: SURGERY

## 2020-09-21 PROCEDURE — 80048 BASIC METABOLIC PNL TOTAL CA: CPT

## 2020-09-21 PROCEDURE — U0002 COVID-19 LAB TEST NON-CDC: HCPCS

## 2020-09-21 PROCEDURE — 2580000003 HC RX 258: Performed by: NURSE ANESTHETIST, CERTIFIED REGISTERED

## 2020-09-21 PROCEDURE — 7100000000 HC PACU RECOVERY - FIRST 15 MIN: Performed by: SURGERY

## 2020-09-21 PROCEDURE — 2500000003 HC RX 250 WO HCPCS: Performed by: NURSE ANESTHETIST, CERTIFIED REGISTERED

## 2020-09-21 PROCEDURE — C1768 GRAFT, VASCULAR: HCPCS | Performed by: SURGERY

## 2020-09-21 DEVICE — GRAFT VASC L45CM DIA4-7MM PTFE CBAS HEP SURF STD WALLED: Type: IMPLANTABLE DEVICE | Site: ARM | Status: FUNCTIONAL

## 2020-09-21 RX ORDER — OXYCODONE HYDROCHLORIDE AND ACETAMINOPHEN 5; 325 MG/1; MG/1
2 TABLET ORAL PRN
Status: DISCONTINUED | OUTPATIENT
Start: 2020-09-21 | End: 2020-09-21 | Stop reason: HOSPADM

## 2020-09-21 RX ORDER — DIPHENHYDRAMINE HYDROCHLORIDE 50 MG/ML
12.5 INJECTION INTRAMUSCULAR; INTRAVENOUS
Status: DISCONTINUED | OUTPATIENT
Start: 2020-09-21 | End: 2020-09-21 | Stop reason: HOSPADM

## 2020-09-21 RX ORDER — FENTANYL CITRATE 50 UG/ML
INJECTION, SOLUTION INTRAMUSCULAR; INTRAVENOUS PRN
Status: DISCONTINUED | OUTPATIENT
Start: 2020-09-21 | End: 2020-09-21 | Stop reason: SDUPTHER

## 2020-09-21 RX ORDER — ROCURONIUM BROMIDE 10 MG/ML
INJECTION, SOLUTION INTRAVENOUS PRN
Status: DISCONTINUED | OUTPATIENT
Start: 2020-09-21 | End: 2020-09-21 | Stop reason: SDUPTHER

## 2020-09-21 RX ORDER — HYDRALAZINE HYDROCHLORIDE 20 MG/ML
5 INJECTION INTRAMUSCULAR; INTRAVENOUS EVERY 10 MIN PRN
Status: DISCONTINUED | OUTPATIENT
Start: 2020-09-21 | End: 2020-09-21 | Stop reason: HOSPADM

## 2020-09-21 RX ORDER — OXYCODONE HYDROCHLORIDE AND ACETAMINOPHEN 5; 325 MG/1; MG/1
1 TABLET ORAL PRN
Status: DISCONTINUED | OUTPATIENT
Start: 2020-09-21 | End: 2020-09-21 | Stop reason: HOSPADM

## 2020-09-21 RX ORDER — CEFAZOLIN SODIUM 1 G/3ML
INJECTION, POWDER, FOR SOLUTION INTRAMUSCULAR; INTRAVENOUS PRN
Status: DISCONTINUED | OUTPATIENT
Start: 2020-09-21 | End: 2020-09-21 | Stop reason: SDUPTHER

## 2020-09-21 RX ORDER — SODIUM CHLORIDE, SODIUM LACTATE, POTASSIUM CHLORIDE, CALCIUM CHLORIDE 600; 310; 30; 20 MG/100ML; MG/100ML; MG/100ML; MG/100ML
INJECTION, SOLUTION INTRAVENOUS CONTINUOUS
Status: DISCONTINUED | OUTPATIENT
Start: 2020-09-21 | End: 2020-09-21 | Stop reason: HOSPADM

## 2020-09-21 RX ORDER — MORPHINE SULFATE 2 MG/ML
1 INJECTION, SOLUTION INTRAMUSCULAR; INTRAVENOUS EVERY 5 MIN PRN
Status: DISCONTINUED | OUTPATIENT
Start: 2020-09-21 | End: 2020-09-21 | Stop reason: HOSPADM

## 2020-09-21 RX ORDER — LIDOCAINE HYDROCHLORIDE 20 MG/ML
INJECTION, SOLUTION EPIDURAL; INFILTRATION; INTRACAUDAL; PERINEURAL PRN
Status: DISCONTINUED | OUTPATIENT
Start: 2020-09-21 | End: 2020-09-21 | Stop reason: SDUPTHER

## 2020-09-21 RX ORDER — PROPOFOL 10 MG/ML
INJECTION, EMULSION INTRAVENOUS PRN
Status: DISCONTINUED | OUTPATIENT
Start: 2020-09-21 | End: 2020-09-21 | Stop reason: SDUPTHER

## 2020-09-21 RX ORDER — PROMETHAZINE HYDROCHLORIDE 25 MG/ML
6.25 INJECTION, SOLUTION INTRAMUSCULAR; INTRAVENOUS
Status: DISCONTINUED | OUTPATIENT
Start: 2020-09-21 | End: 2020-09-21 | Stop reason: HOSPADM

## 2020-09-21 RX ORDER — MEPERIDINE HYDROCHLORIDE 50 MG/ML
12.5 INJECTION INTRAMUSCULAR; INTRAVENOUS; SUBCUTANEOUS EVERY 5 MIN PRN
Status: DISCONTINUED | OUTPATIENT
Start: 2020-09-21 | End: 2020-09-21 | Stop reason: HOSPADM

## 2020-09-21 RX ORDER — ONDANSETRON 2 MG/ML
INJECTION INTRAMUSCULAR; INTRAVENOUS PRN
Status: DISCONTINUED | OUTPATIENT
Start: 2020-09-21 | End: 2020-09-21 | Stop reason: SDUPTHER

## 2020-09-21 RX ORDER — SODIUM CHLORIDE 0.9 % (FLUSH) 0.9 %
10 SYRINGE (ML) INJECTION PRN
Status: DISCONTINUED | OUTPATIENT
Start: 2020-09-21 | End: 2020-09-21 | Stop reason: HOSPADM

## 2020-09-21 RX ORDER — LABETALOL HYDROCHLORIDE 5 MG/ML
5 INJECTION, SOLUTION INTRAVENOUS EVERY 10 MIN PRN
Status: DISCONTINUED | OUTPATIENT
Start: 2020-09-21 | End: 2020-09-21 | Stop reason: HOSPADM

## 2020-09-21 RX ORDER — ONDANSETRON 2 MG/ML
4 INJECTION INTRAMUSCULAR; INTRAVENOUS PRN
Status: DISCONTINUED | OUTPATIENT
Start: 2020-09-21 | End: 2020-09-21 | Stop reason: HOSPADM

## 2020-09-21 RX ORDER — PHENYLEPHRINE HCL IN 0.9% NACL 1 MG/10 ML
SYRINGE (ML) INTRAVENOUS PRN
Status: DISCONTINUED | OUTPATIENT
Start: 2020-09-21 | End: 2020-09-21 | Stop reason: SDUPTHER

## 2020-09-21 RX ORDER — SODIUM CHLORIDE 0.9 % (FLUSH) 0.9 %
10 SYRINGE (ML) INJECTION EVERY 12 HOURS SCHEDULED
Status: DISCONTINUED | OUTPATIENT
Start: 2020-09-21 | End: 2020-09-21 | Stop reason: HOSPADM

## 2020-09-21 RX ADMIN — Medication 100 MCG: at 10:44

## 2020-09-21 RX ADMIN — Medication 50 MCG: at 12:10

## 2020-09-21 RX ADMIN — FENTANYL CITRATE 25 MCG: 50 INJECTION INTRAMUSCULAR; INTRAVENOUS at 11:23

## 2020-09-21 RX ADMIN — ROCURONIUM BROMIDE 10 MG: 10 SOLUTION INTRAVENOUS at 11:28

## 2020-09-21 RX ADMIN — FAMOTIDINE 20 MG: 10 INJECTION INTRAVENOUS at 10:22

## 2020-09-21 RX ADMIN — LIDOCAINE HYDROCHLORIDE 60 MG: 20 INJECTION, SOLUTION EPIDURAL; INFILTRATION; INTRACAUDAL; PERINEURAL at 10:32

## 2020-09-21 RX ADMIN — CEFAZOLIN 1000 MG: 1 INJECTION, POWDER, FOR SOLUTION INTRAMUSCULAR; INTRAVENOUS at 10:38

## 2020-09-21 RX ADMIN — Medication 100 MCG: at 10:53

## 2020-09-21 RX ADMIN — SUGAMMADEX 200 MG: 100 INJECTION, SOLUTION INTRAVENOUS at 12:28

## 2020-09-21 RX ADMIN — ONDANSETRON 3 MG: 2 INJECTION INTRAMUSCULAR; INTRAVENOUS at 11:07

## 2020-09-21 RX ADMIN — FENTANYL CITRATE 25 MCG: 50 INJECTION INTRAMUSCULAR; INTRAVENOUS at 11:27

## 2020-09-21 RX ADMIN — PHENYLEPHRINE HYDROCHLORIDE 50 MCG/MIN: 10 INJECTION INTRAVENOUS at 11:06

## 2020-09-21 RX ADMIN — ROCURONIUM BROMIDE 30 MG: 10 SOLUTION INTRAVENOUS at 10:32

## 2020-09-21 RX ADMIN — PROPOFOL 70 MG: 10 INJECTION, EMULSION INTRAVENOUS at 10:32

## 2020-09-21 RX ADMIN — FENTANYL CITRATE 50 MCG: 50 INJECTION INTRAMUSCULAR; INTRAVENOUS at 10:32

## 2020-09-21 RX ADMIN — Medication 50 MCG: at 11:06

## 2020-09-21 RX ADMIN — SODIUM CHLORIDE, SODIUM LACTATE, POTASSIUM CHLORIDE, AND CALCIUM CHLORIDE: .6; .31; .03; .02 INJECTION, SOLUTION INTRAVENOUS at 10:26

## 2020-09-21 ASSESSMENT — PULMONARY FUNCTION TESTS
PIF_VALUE: 1
PIF_VALUE: 17
PIF_VALUE: 15
PIF_VALUE: 16
PIF_VALUE: 15
PIF_VALUE: 6
PIF_VALUE: 15
PIF_VALUE: 15
PIF_VALUE: 16
PIF_VALUE: 15
PIF_VALUE: 16
PIF_VALUE: 10
PIF_VALUE: 24
PIF_VALUE: 14
PIF_VALUE: 15
PIF_VALUE: 14
PIF_VALUE: 17
PIF_VALUE: 15
PIF_VALUE: 17
PIF_VALUE: 15
PIF_VALUE: 15
PIF_VALUE: 14
PIF_VALUE: 15
PIF_VALUE: 14
PIF_VALUE: 16
PIF_VALUE: 3
PIF_VALUE: 15
PIF_VALUE: 16
PIF_VALUE: 15
PIF_VALUE: 15
PIF_VALUE: 16
PIF_VALUE: 15
PIF_VALUE: 1
PIF_VALUE: 17
PIF_VALUE: 14
PIF_VALUE: 15
PIF_VALUE: 14
PIF_VALUE: 14
PIF_VALUE: 2
PIF_VALUE: 16
PIF_VALUE: 15
PIF_VALUE: 13
PIF_VALUE: 15
PIF_VALUE: 10
PIF_VALUE: 15
PIF_VALUE: 16
PIF_VALUE: 16
PIF_VALUE: 15
PIF_VALUE: 15
PIF_VALUE: 14
PIF_VALUE: 17
PIF_VALUE: 15
PIF_VALUE: 14
PIF_VALUE: 15
PIF_VALUE: 2
PIF_VALUE: 13
PIF_VALUE: 14
PIF_VALUE: 1
PIF_VALUE: 15
PIF_VALUE: 14
PIF_VALUE: 13
PIF_VALUE: 15
PIF_VALUE: 14
PIF_VALUE: 15
PIF_VALUE: 14
PIF_VALUE: 16
PIF_VALUE: 15
PIF_VALUE: 15
PIF_VALUE: 16
PIF_VALUE: 15
PIF_VALUE: 17
PIF_VALUE: 15
PIF_VALUE: 15
PIF_VALUE: 14
PIF_VALUE: 15
PIF_VALUE: 14
PIF_VALUE: 3
PIF_VALUE: 15
PIF_VALUE: 14
PIF_VALUE: 19
PIF_VALUE: 1
PIF_VALUE: 16
PIF_VALUE: 15
PIF_VALUE: 17
PIF_VALUE: 15
PIF_VALUE: 15
PIF_VALUE: 6
PIF_VALUE: 14
PIF_VALUE: 15
PIF_VALUE: 14
PIF_VALUE: 14
PIF_VALUE: 15
PIF_VALUE: 3
PIF_VALUE: 14
PIF_VALUE: 14
PIF_VALUE: 15
PIF_VALUE: 16
PIF_VALUE: 14
PIF_VALUE: 15
PIF_VALUE: 15
PIF_VALUE: 14
PIF_VALUE: 15
PIF_VALUE: 14
PIF_VALUE: 15
PIF_VALUE: 15
PIF_VALUE: 17
PIF_VALUE: 15
PIF_VALUE: 15
PIF_VALUE: 14
PIF_VALUE: 17
PIF_VALUE: 15
PIF_VALUE: 15
PIF_VALUE: 14
PIF_VALUE: 15
PIF_VALUE: 15
PIF_VALUE: 17
PIF_VALUE: 14
PIF_VALUE: 14
PIF_VALUE: 15
PIF_VALUE: 17
PIF_VALUE: 17
PIF_VALUE: 15
PIF_VALUE: 1
PIF_VALUE: 17
PIF_VALUE: 15
PIF_VALUE: 14
PIF_VALUE: 15
PIF_VALUE: 15

## 2020-09-21 ASSESSMENT — PAIN - FUNCTIONAL ASSESSMENT: PAIN_FUNCTIONAL_ASSESSMENT: 0-10

## 2020-09-21 NOTE — H&P
I have reviewed the history and physical (See note dated 9/18/2020) and examined the patient and find no relevant changes. I have reviewed with the patient and/or family the risks, benefits, and alternatives to the procedure.

## 2020-09-21 NOTE — BRIEF OP NOTE
Brief Postoperative Note      Patient: Tanner Tolbert  YOB: 1950  MRN: [id number]    Date of Procedure: 9/21/2020    Pre-Op Diagnosis: END STAGE RENAL DISEASE, Graft thrombosis. Post-Op Diagnosis: Same with Ischemic steal syndrome       Procedure(s):  THROMBECTOMY, ANGIOGRAM AND REVISION USING DISTAL INFLOW OF DIALYSIS GRAFT    Surgeon(s):  Elizabeth Aguirre MD    Assistant:  Surgical Assistant: Rosmery Severe    Anesthesia: General    Estimated Blood Loss (mL): less than 50     Complications: None    Specimens:   * No specimens in log *    Implants:  Implant Name Type Inv.  Item Serial No.  Lot No. LRB No. Used Action   GRAFT VASC STD WALL STRTCH [id number] - [id number] Vascular/Graft/Patch/Filter GRAFT VASC STD WALL STRTCH [id number]  [employer]  Left 1 Implanted         Drains: * No LDAs found *      Electronically signed by Elizabeth Aguirre MD on 9/21/2020 at 12:32 PM

## 2020-09-21 NOTE — ANESTHESIA POSTPROCEDURE EVALUATION
Department of Anesthesiology  Postprocedure Note    Patient: Dee Bynum  MRN: [id number]  YOB: 1950  Date of evaluation: 9/21/2020  Time:  1:50 PM     Procedure Summary     Date:  09/21/20 Room / Location:  [place] [address] ([facility]) / [facility]    Anesthesia Start:  1026 Anesthesia Stop:  1250    Procedure:  Alvah Richards AND REVISION USING DISTAL INFLOW OF DIALYSIS GRAFT (Left Arm Upper) Diagnosis:       End stage renal disease ([address]      (END STAGE RENAL DISEASE)    Surgeon: Stephaine Patterson MD Responsible Provider:  Radha Almonte MD    Anesthesia Type:  general ASA Status:  4          Anesthesia Type: general    Mike Phase I: Mike Score: 9    Mike Phase II: Mike Score: 10    Last vitals: Reviewed and per EMR flowsheets.        Anesthesia Post Evaluation    Comments: Postoperative Anesthesia Note    Name:    Dee Bynum  MRN:      [id number]    Patient Vitals in the past 12 hrs:  09/21/20 1300, BP:126/86, Pulse:93, Resp:17, SpO2:100 %  09/21/20 1255, BP:125/81, Pulse:95, Resp:19, SpO2:100 %  09/21/20 1250, BP:137/76, Temp:96.9 °F (36.1 °C), Pulse:91, Resp:19, SpO2:100 %  09/21/20 0744, BP:106/75, Temp:100 °F (37.8 °C), Temp src:Temporal, Pulse:91, Resp:12, SpO2:97 %, Height:4' 10\" (1.473 m), Weight:70 lb 9 oz (32 kg)     LABS:    CBC  Lab Results       Component                Value               Date/Time                  WBC                      9.3                 09/21/2020 09:50 AM        HGB                      9.4 (L)             09/21/2020 09:50 AM        HCT                      29.2 (L)            09/21/2020 09:50 AM        PLT                      178                 09/21/2020 09:50 AM   RENAL  Lab Results       Component                Value               Date/Time                  NA                       144                 09/21/2020 09:50 AM        K                        3.4 (L)             09/21/2020 09:50 AM        CL                       109 09/21/2020 09:50 AM        CO2                      21                  09/21/2020 09:50 AM        BUN                      44 (H)              09/21/2020 09:50 AM        CREATININE               6.3 (HH)            09/21/2020 09:50 AM        GLUCOSE                  99                  09/21/2020 09:50 AM   COAGS  Lab Results       Component                Value               Date/Time                  PROTIME                  17.5 (H)            09/21/2020 09:50 AM        INR                      1.53 (H)            09/21/2020 09:50 AM        APTT                     31.4                09/21/2020 09:50 AM     Intake & Output:  @41VSUT@    Nausea & Vomiting:  No    Level of Consciousness:  Awake    Pain Assessment:  Adequate analgesia    Anesthesia Complications:  No apparent anesthetic complications    SUMMARY      Vital signs stable  OK to discharge from Stage I post anesthesia care.   Care transferred from Anesthesiology department on discharge from perioperative area

## 2020-09-21 NOTE — ANESTHESIA PRE PROCEDURE
Department of Anesthesiology  Preprocedure Note       Name:  Ronni Lou   Age:  72 y.o.  :  1950                                          MRN:  [id number]         Date:  2020      Surgeon: Garce Mack):  Elizabeth Tavares MD    Procedure: Procedure(s):  THROMBECTOMY/REVISION LEFT UPPER EXTREMITY DIALYSIS GRAFT    Medications prior to admission:   Prior to Admission medications    Medication Sig Start Date End Date Taking? Authorizing Provider   HYDROcodone-acetaminophen (NORCO) 5-325 MG per tablet Take 1 tablet by mouth every 4 hours as needed for Pain for up to 5 days. Intended supply: 5 days.  Take lowest dose possible to manage pain 20  Elizabeth Tavares MD   doxycycline hyclate (VIBRAMYCIN) 100 MG capsule Take 100 mg by mouth 2 times daily    Historical Provider, MD   mirtazapine (REMERON) 15 MG tablet Take 15 mg by mouth nightly    Historical Provider, MD   Potassium Phosphate Monobasic (K-PHOS PO) Take 250 mg by mouth daily    Historical Provider, MD   potassium chloride (KLOR-CON) 10 MEQ extended release tablet Take 10 mEq by mouth daily  dialysis days only    Historical Provider, MD   midodrine (PROAMATINE) 5 MG tablet Take 1 tablet by mouth as needed (HD: Give pre and mid tx for Sbp <100) 20   Dania Ruano MD   bismuth subsalicylate (PEPTO BISMOL) 525 MG/15ML suspension Take 30 mLs by mouth every 8 hours as needed for Indigestion    Historical Provider, MD   lactulose (CHRONULAC) 10 GM/15ML solution Take 20 g by mouth daily as needed    Historical Provider, MD   Nutritional Supplements (RESOURCE 2.0 PO) Take 120 mLs by mouth 3 times daily    Historical Provider, MD   levothyroxine (SYNTHROID) 75 MCG tablet Take 75 mcg by mouth Daily    Historical Provider, MD   ARIPiprazole (ABILIFY) 10 MG tablet Take 10 mg by mouth daily    Historical Provider, MD   aspirin 81 MG EC tablet Take 81 mg by mouth daily    Historical Provider, MD   docusate sodium (COLACE) 100 MG capsule Take 100 mg by mouth 2 times daily as needed     Historical Provider, MD   ferrous sulfate (FE TABS 325) 325 (65 Fe) MG EC tablet Take 325 mg by mouth 2 times daily    Historical Provider, MD   isosorbide mononitrate (IMDUR) 30 MG extended release tablet Take 30 mg by mouth daily    Historical Provider, MD   levETIRAcetam (KEPPRA) 500 MG tablet Take 500 mg by mouth 2 times daily    Historical Provider, MD   raloxifene (EVISTA) 60 MG tablet Take 60 mg by mouth daily    Historical Provider, MD   senna (SENOKOT) 8.6 MG tablet Take 1 tablet by mouth as needed     Historical Provider, MD   vitamin C (ASCORBIC ACID) 500 MG tablet Take 500 mg by mouth daily    Historical Provider, MD   sertraline (ZOLOFT) 50 MG tablet Take 50 mg by mouth daily    Historical Provider, MD   pravastatin (PRAVACHOL) 40 MG tablet Take 40 mg by mouth daily    Historical Provider, MD   acetaminophen (TYLENOL) 500 MG tablet Take 500 mg by mouth every 4 hours as needed for Pain    Historical Provider, MD   ondansetron (ZOFRAN) 4 MG tablet Take 4 mg by mouth every 6 hours as needed for Nausea or Vomiting    Historical Provider, MD       Current medications:    Current Facility-Administered Medications   Medication Dose Route Frequency Provider Last Rate Last Dose    lactated ringers infusion   Intravenous Continuous Sharon Higgins MD        sodium chloride flush 0.9 % injection 10 mL  10 mL Intravenous 2 times per day Sharon Higgins MD        sodium chloride flush 0.9 % injection 10 mL  10 mL Intravenous PRN Sharon Higgins MD        famotidine (PEPCID) injection 20 mg  20 mg Intravenous Once Sharon Higgins MD           Allergies:     Allergies   Allergen Reactions    Penicillins Rash       Problem List:    Patient Active Problem List   Diagnosis Code    JEN (acute kidney injury) ([facility] N17.9    Seizures ([facility] R56.9    Acquired hypothyroidism E03.9    GI bleed K92.2    Proctitis K62.89 Body mass index is 19.86 kg/m². CBC:   Lab Results   Component Value Date    WBC 8.7 09/17/2020    RBC 3.36 09/17/2020    HGB 9.9 09/17/2020    HCT 31.0 09/17/2020    MCV 92.2 09/17/2020    RDW 16.8 09/17/2020     09/17/2020       CMP:   Lab Results   Component Value Date     09/17/2020    K 3.8 09/17/2020     09/17/2020    CO2 24 09/17/2020    BUN 19 09/17/2020    CREATININE 3.0 09/17/2020    GFRAA 19 09/17/2020    AGRATIO 0.8 07/17/2020    LABGLOM 15 09/17/2020    GLUCOSE 90 09/17/2020    PROT 5.5 07/17/2020    CALCIUM 8.9 09/17/2020    BILITOT <0.2 07/17/2020    ALKPHOS 147 07/17/2020    AST 19 07/17/2020    ALT 11 07/17/2020       POC Tests: No results for input(s): POCGLU, POCNA, POCK, POCCL, POCBUN, POCHEMO, POCHCT in the last 72 hours.     Coags:   Lab Results   Component Value Date    PROTIME 13.9 07/28/2020    INR 1.20 07/28/2020    APTT 35.4 07/24/2020       HCG (If Applicable): No results found for: PREGTESTUR, PREGSERUM, HCG, HCGQUANT     ABGs: No results found for: PHART, PO2ART, RNY7IZV, HKP1CTQ, BEART, L2GZUEFB     Type & Screen (If Applicable):  No results found for: LABABO, LABRH    Drug/Infectious Status (If Applicable):  No results found for: HIV, HEPCAB    COVID-19 Screening (If Applicable):   Lab Results   Component Value Date    COVID19 Not Detected 07/17/2020         Anesthesia Evaluation  Patient summary reviewed no history of anesthetic complications:   Airway: Mallampati: II  TM distance: >3 FB   Neck ROM: full  Mouth opening: > = 3 FB Dental: normal exam         Pulmonary:Negative Pulmonary ROS and normal exam  breath sounds clear to auscultation                             Cardiovascular:Negative CV ROS  Exercise tolerance: good (>4 METS),   (+) hypertension:, pacemaker: pacemaker,         Rhythm: regular  Rate: normal                    Neuro/Psych:   (+) seizures:,             GI/Hepatic/Renal: Neg GI/Hepatic/Renal ROS  (+) renal disease: ESRD and dialysis, Endo/Other:    (+) hypothyroidism::., .                 Abdominal:           Vascular: negative vascular ROS. EKG 7/17/2020  Normal sinus rhythmPoor R wave progressionNo previous ECGs availableConfirmed by Filipe Bernabe MD, [address] ([id number]) on 7/20/2020 7:21:11 AM     Anesthesia Plan      general     ASA 4     (I discussed with the patient the risks and benefits of PIV, anesthesia, IV Narcotics, PACU. All questions were answered the patient agrees with the plan and wishes to proceed)  Induction: intravenous. Pt with elevated temperature when checked this AM between 99.3-100.3 F. Denies any sxs of acute illness. Covid negative. Surgery urgent, no sxs and temperature below febrile range. Discussed with surgeon and will proceed with surgery.         Alissa Duenas MD   9/21/2020

## 2020-09-21 NOTE — PROGRESS NOTES
Dr. Fatoumata Rm notified about pt elevated temperature- ranging from .3 with temporal thermometer. No symptoms today but reports had nausea and emesis x 1 yesterday at 1800. COVID ordered.

## 2020-09-21 NOTE — PROGRESS NOTES
Preoperative Screening for Elective Surgery/Invasive Procedures While COVID-19 present in the community     Have you had any of the following symptoms? o Fever, chills no  o Cough no  o Shortness of breath no  o Muscle aches/pain no  o Diarrhea no  o Abdominal pain, nausea, vomiting-yesterday  o Loss or decrease in taste and / or smell- no   Risk of Exposure- no-lives in [facility]  o Have you recently been hospitalized for COVID-19 or flu-like illness, if so when?  o Recently diagnosed with COVID-19, if so when?  o Recently tested for COVID-19, if so when?  o Have you been in close contact with a person or family member who currently has or recently had COVID-19? If yes, when and in what context?  o Do you live with anybody who in the last 14 days has had fever, chills, shortness of breath, muscle aches, flu-like illness?  o Do you have any close contacts or family members who are currently in the hospital for COVID-19 or flu-like illness? If yes, assess recent close contact with this person. Indicate if the patient has a positive screen by answering yes to one or more of the above questions. Patients who test positive or screen positive prior to surgery or on the day of surgery should be evaluated in conjunction with the surgeon/proceduralist/anesthesiologist to determine the urgency of the procedure.

## 2020-09-21 NOTE — PROGRESS NOTES
REmoved IV. Reviewed d/c instructions with pt and sister. Pt has been told to try not to use arm and keep straight when possible until next f/o visit.   Wheeled out to d/c back to home

## 2020-09-22 NOTE — OP NOTE
[address] [address]                                OPERATIVE REPORT    PATIENT NAME: John Obrien                       :        1950  MED REC NO:   [id number]                          ROOM:  ACCOUNT NO:   [de-identified]                           ADMIT DATE: 2020  PROVIDER:     Rasheed House MD    DATE OF PROCEDURE:  2020    PREOPERATIVE DIAGNOSIS:  Thrombosis of left upper extremity brachial  artery to axillary vein dialysis graft. POSTOPERATIVE DIAGNOSES:  1.  Graft thrombosis. 2.  Ischemic Steal syndrome associated with dialysis graft. PROCEDURES PERFORMED:  1. Graft thrombectomy. 2.  Revision of graft using distal inflow (NOY procedure). 3.  Left upper extremity angiograms. SURGEON:  Rasheed House MD    ANESTHESIA:  General endotracheal.    INDICATIONS:  The patient is a 67-year-old female with end-stage renal  disease on hemodialysis. She underwent placement of a left upper arm  dialysis graft approximately 3 days prior. The graft acutely thrombosed  for unclear reasons. The patient is brought back to the operating room  for graft thrombectomy with possible revision. OPERATIVE PROCEDURE:  The patient was brought to the operating room,  placed in the supine position. General endotracheal anesthesia induced. After adequate anesthesia, the left arm was prepped and draped in  sterile fashion. The incision just above the antecubital area was  opened and brachial artery was flattened and non-pulsatile. An incision  was made in the graft just beyond the arterial anastomosis. Thrombectomy was performed of the graft by passing a Kathrin  thrombectomy catheter through the graft removing the thrombotic  material.  The graft was then flushed with heparinized saline solution  and this flushed quite easily.   I then passed a Kathrin thrombectomy  catheter retrograde in the brachial artery and extracted a plug of  thrombus and thrombotic material establishing excellent arterial inflow. The proximal brachial artery was clamped with a micro bulldog clamp. I  then passed the catheter distally and extracted thrombus as well  establishing excellent backbleeding. The distal artery was also  controlled with a micro bulldog clamp. The anastomosis was inspected  and it was widely patent without any areas of narrowing, but it was  unclear why the artery thrombosed. Nevertheless, the incision in the  graft was closed using running 6-0 Prolene suture. Micro bulldog clamps  were removed. There was excellent flow in the graft and excellent  arterial signal proximally and distally in the brachial artery. Unfortunately, the hand was white and ischemic appearing. I could not  find Doppler signals at the wrist without graft compression. The  brachial artery proximal to the anastomosis was then punctured with a  22-gauge angiocatheter, and angiogram was performed, showing all flow  going through the fistula; however with graft compression, there was  flow noted distally. Because of this, I felt the patient would have  significant ischemic hand pain and therefore I elected to revise the  fistula using distal inflow. A longitudinal incision was made in the  upper forearm just distal to the antecubital crease. The brachial  artery here was identified and traced down to its bifurcation of the  radial artery and common ulnar artery. The radial artery was much more  superficial.  The brachial artery was then reclamped proximally and  distally. The prior graft was transected, leaving a small stub of  [place] material attached to the brachial artery and this stub was then  oversewn using 6-0 Prolene running sutures. The brachial artery clamps  were then removed.   There was an excellent pulse in the brachial artery  and there were excellent Doppler signals in the radial and ulnar artery  at the wrist.  I then

## 2020-10-02 ENCOUNTER — HOSPITAL ENCOUNTER (EMERGENCY)
Age: 70
Discharge: HOME | End: 2020-10-02
Payer: MEDICARE

## 2020-10-02 ENCOUNTER — OFFICE VISIT (OUTPATIENT)
Dept: VASCULAR SURGERY | Age: 70
End: 2020-10-02

## 2020-10-02 VITALS
TEMPERATURE: 97.9 F | DIASTOLIC BLOOD PRESSURE: 73 MMHG | RESPIRATION RATE: 18 BRPM | SYSTOLIC BLOOD PRESSURE: 107 MMHG | HEART RATE: 85 BPM | OXYGEN SATURATION: 100 %

## 2020-10-02 VITALS
HEIGHT: 55 IN | SYSTOLIC BLOOD PRESSURE: 90 MMHG | DIASTOLIC BLOOD PRESSURE: 60 MMHG | WEIGHT: 95 LBS | BODY MASS INDEX: 21.98 KG/M2

## 2020-10-02 PROCEDURE — 99024 POSTOP FOLLOW-UP VISIT: CPT | Performed by: SURGERY

## 2020-10-02 PROCEDURE — 99282 EMERGENCY DEPT VISIT SF MDM: CPT

## 2020-10-02 NOTE — ED NOTES
Writer spoke with Lewis English, she is on her way to [facility].      Erik Nolasco RN  10/02/20 2274

## 2020-10-02 NOTE — ED NOTES
Bed: 17  Expected date:   Expected time:   Means of arrival:   Comments:  Mansi Mcconnell 157, [facility]  10/02/20 0027

## 2020-10-02 NOTE — PROGRESS NOTES
Outpatient Post-Op Visit  PATIENT NAME: Reji Valle     TODAY'S DATE: 10/2/2020    SUBJECTIVE:    Pt is seen in f/u after LUE graft revision. No issues. OBJECTIVE:   VITALS:  BP 90/60 (Site: Right Upper Arm)   Ht 4' (1.219 m)   Wt 95 lb (43.1 kg)   BMI 28.99 kg/m²                 INCISION: clean, dry. Slight serous drainage forearm incision. Skin edges . Good bruit in graft. ASSESSMENT AND PLAN:  72 y.o. female status post LUE graft revision. Steri-strips applied to forearm incision.,   Ok to start cannulation.       Cinthya Bonilla MD, FACS

## 2020-10-03 NOTE — ED PROVIDER NOTES
[place], [place] at [address] [facility]     History reviewed. No pertinent family history. Social History     Socioeconomic History    Marital status: Single     Spouse name: Not on file    Number of children: Not on file    Years of education: Not on file    Highest education level: Not on file   Occupational History    Not on file   Social Needs    Financial resource strain: Not on file    Food insecurity     Worry: Not on file     Inability: Not on file    Transportation needs     Medical: Not on file     Non-medical: Not on file   Tobacco Use    Smoking status: Never Smoker    Smokeless tobacco: Never Used   Substance and Sexual Activity    Alcohol use: Not Currently    Drug use: Not Currently    Sexual activity: Not Currently   Lifestyle    Physical activity     Days per week: Not on file     Minutes per session: Not on file    Stress: Not on file   Relationships    Social connections     Talks on phone: Not on file     Gets together: Not on file     Attends [employer] service: Not on file     Active member of club or organization: Not on file     Attends meetings of clubs or organizations: Not on file     Relationship status: Not on file    Intimate partner violence     Fear of current or ex partner: Not on file     Emotionally abused: Not on file     Physically abused: Not on file     Forced sexual activity: Not on file   Other Topics Concern    Not on file   Social History Narrative    Not on file     No current facility-administered medications for this encounter.       Current Outpatient Medications   Medication Sig Dispense Refill    doxycycline hyclate (VIBRAMYCIN) 100 MG capsule Take 100 mg by mouth 2 times daily      mirtazapine (REMERON) 15 MG tablet Take 15 mg by mouth nightly      Potassium Phosphate Monobasic (K-PHOS PO) Take 250 mg by mouth daily      potassium chloride (KLOR-CON) 10 MEQ extended release tablet Take 10 mEq by mouth daily Monday Wednesday Friday dialysis days only      midodrine (PROAMATINE) 5 MG tablet Take 1 tablet by mouth as needed (HD: Give pre and mid tx for Sbp <100) 90 tablet 3    bismuth subsalicylate (PEPTO BISMOL) 525 MG/15ML suspension Take 30 mLs by mouth every 8 hours as needed for Indigestion      lactulose (CHRONULAC) 10 GM/15ML solution Take 20 g by mouth daily as needed      Nutritional Supplements (RESOURCE 2.0 PO) Take 120 mLs by mouth 3 times daily      levothyroxine (SYNTHROID) 75 MCG tablet Take 75 mcg by mouth Daily      ARIPiprazole (ABILIFY) 10 MG tablet Take 10 mg by mouth daily      aspirin 81 MG EC tablet Take 81 mg by mouth daily      docusate sodium (COLACE) 100 MG capsule Take 100 mg by mouth 2 times daily as needed       ferrous sulfate (FE TABS 325) 325 (65 Fe) MG EC tablet Take 325 mg by mouth 2 times daily      isosorbide mononitrate (IMDUR) 30 MG extended release tablet Take 30 mg by mouth daily      levETIRAcetam (KEPPRA) 500 MG tablet Take 500 mg by mouth 2 times daily      raloxifene (EVISTA) 60 MG tablet Take 60 mg by mouth daily      senna (SENOKOT) 8.6 MG tablet Take 1 tablet by mouth as needed       vitamin C (ASCORBIC ACID) 500 MG tablet Take 500 mg by mouth daily      sertraline (ZOLOFT) 50 MG tablet Take 50 mg by mouth daily      pravastatin (PRAVACHOL) 40 MG tablet Take 40 mg by mouth daily      acetaminophen (TYLENOL) 500 MG tablet Take 500 mg by mouth every 4 hours as needed for Pain      ondansetron (ZOFRAN) 4 MG tablet Take 4 mg by mouth every 6 hours as needed for Nausea or Vomiting       Allergies   Allergen Reactions    Penicillins Rash       REVIEW OF SYSTEMS  6 systems reviewed, pertinent positives per HPI otherwise noted to be negative    PHYSICAL EXAM  /73   Pulse 85   Temp 97.9 °F (36.6 °C) (Oral)   Resp 18   SpO2 100%   GENERAL APPEARANCE: Awake and alert. Cooperative. No acute distress. HEAD: Normocephalic. Atraumatic. EYES: PERRL. EOM's grossly intact.    ENT: Mucous membranes are moist.   NECK: Supple. Normal ROM. CHEST: Equal symmetric chest rise. LUNGS: Breathing is unlabored. Speaking comfortably in full sentences. Abdomen: Nondistended   EXTREMITIES: MAEE. No acute deformities. on exam of right upper extremity patient with fistula noted to right AC region. Steri-Strips in place. There is a very minimal amount of blood noted to bandage. Area was palpated without acute abnormalities and no active bleeding present. Neurovascularly intact. SKIN: Warm and dry. NEUROLOGICAL: Alert and oriented. Strength is 5/5 in all extremities and sensation is intact. MDM  Pain control was not required while here in the emergency department. Sterile bandage reapplied to area. Will be discharged home with recommendations to continue observe for recurrent bleeding. Have discussed return precautions otherwise and patient is in agreement and comfortable at discharge. A discussion was had with Ms. Cobb regarding bleeding, ED findings and recommendations for follow-up. Risk management discussed and shared decision making had with patient and/or surrogate. All questions were answered. Patient will follow up with PCP within 2 to 3 days for wound check and for further evaluation/treatment. Patient will return to ED for new/worsening symptoms. Patient was informed to continue home medications as prescribed. MDM  I estimate there is LOW risk for CELLULITIS, COMPARTMENT SYNDROME, NECROTIZING FASCIITIS, TENDON OR NEUROVASCULAR INJURY, or FOREIGN BODY, thus I consider the discharge disposition reasonable. Also, there is no evidence or peritonitis, sepsis, or toxicity. Jose Burciaga and I have discussed the diagnosis and risks, and we agree with discharging home to follow-up with their primary doctor. We also discussed returning to the Emergency Department immediately if new or worsening symptoms occur.  We have discussed the symptoms which are most concerning (e.g., changing or worsening pain, fever, numbness, weakness, cool or painful digits) that necessitate immediate return. Final Impression  1. Visit for wound check      Discharge Vital Signs:  Blood pressure 107/73, pulse 85, temperature 97.9 °F (36.6 °C), temperature source Oral, resp. rate 18, SpO2 100 %. DISPOSITION  Patient was discharged to home in good condition.             William Fraserma  10/02/20 4046

## 2020-10-08 ENCOUNTER — APPOINTMENT (OUTPATIENT)
Dept: CT IMAGING | Age: 70
DRG: 312 | End: 2020-10-08
Payer: MEDICARE

## 2020-10-08 ENCOUNTER — APPOINTMENT (OUTPATIENT)
Dept: GENERAL RADIOLOGY | Age: 70
DRG: 312 | End: 2020-10-08
Payer: MEDICARE

## 2020-10-08 ENCOUNTER — HOSPITAL ENCOUNTER (INPATIENT)
Age: 70
LOS: 1 days | Discharge: NURSING, REHAB OR LONG-TERM CARE | DRG: 312 | End: 2020-10-10
Attending: STUDENT IN AN ORGANIZED HEALTH CARE EDUCATION/TRAINING PROGRAM | Admitting: INTERNAL MEDICINE
Payer: MEDICARE

## 2020-10-08 LAB
A/G RATIO: 0.7 (ref 1.1–2.2)
ALBUMIN SERPL-MCNC: 2.3 G/DL (ref 3.4–5)
ALP BLD-CCNC: 653 U/L (ref 40–129)
ALT SERPL-CCNC: 20 U/L (ref 10–40)
ANION GAP SERPL CALCULATED.3IONS-SCNC: 10 MMOL/L (ref 3–16)
AST SERPL-CCNC: 60 U/L (ref 15–37)
BASOPHILS ABSOLUTE: 0.1 K/UL (ref 0–0.2)
BASOPHILS RELATIVE PERCENT: 1.1 %
BILIRUB SERPL-MCNC: 0.7 MG/DL (ref 0–1)
BUN BLDV-MCNC: 27 MG/DL (ref 7–20)
CALCIUM SERPL-MCNC: 8.8 MG/DL (ref 8.3–10.6)
CHLORIDE BLD-SCNC: 99 MMOL/L (ref 99–110)
CO2: 23 MMOL/L (ref 21–32)
CREAT SERPL-MCNC: 3.7 MG/DL (ref 0.6–1.2)
EOSINOPHILS ABSOLUTE: 0 K/UL (ref 0–0.6)
EOSINOPHILS RELATIVE PERCENT: 0.3 %
GFR AFRICAN AMERICAN: 15
GFR NON-AFRICAN AMERICAN: 12
GLOBULIN: 3.3 G/DL
GLUCOSE BLD-MCNC: 123 MG/DL (ref 70–99)
HCT VFR BLD CALC: 34.9 % (ref 36–48)
HEMOGLOBIN: 10.6 G/DL (ref 12–16)
LACTIC ACID, SEPSIS: 1.8 MMOL/L (ref 0.4–1.9)
LACTIC ACID, SEPSIS: 2.8 MMOL/L (ref 0.4–1.9)
LYMPHOCYTES ABSOLUTE: 1.4 K/UL (ref 1–5.1)
LYMPHOCYTES RELATIVE PERCENT: 15.4 %
MCH RBC QN AUTO: 31 PG (ref 26–34)
MCHC RBC AUTO-ENTMCNC: 30.5 G/DL (ref 31–36)
MCV RBC AUTO: 101.7 FL (ref 80–100)
MONOCYTES ABSOLUTE: 0.8 K/UL (ref 0–1.3)
MONOCYTES RELATIVE PERCENT: 9.2 %
NEUTROPHILS ABSOLUTE: 6.8 K/UL (ref 1.7–7.7)
NEUTROPHILS RELATIVE PERCENT: 74 %
PDW BLD-RTO: 20.4 % (ref 12.4–15.4)
PLATELET # BLD: 107 K/UL (ref 135–450)
PMV BLD AUTO: 8.8 FL (ref 5–10.5)
POTASSIUM REFLEX MAGNESIUM: 4 MMOL/L (ref 3.5–5.1)
RBC # BLD: 3.43 M/UL (ref 4–5.2)
SODIUM BLD-SCNC: 132 MMOL/L (ref 136–145)
TOTAL PROTEIN: 5.6 G/DL (ref 6.4–8.2)
TROPONIN: 0.15 NG/ML
WBC # BLD: 9.1 K/UL (ref 4–11)

## 2020-10-08 PROCEDURE — 2580000003 HC RX 258: Performed by: STUDENT IN AN ORGANIZED HEALTH CARE EDUCATION/TRAINING PROGRAM

## 2020-10-08 PROCEDURE — 80053 COMPREHEN METABOLIC PANEL: CPT

## 2020-10-08 PROCEDURE — 84484 ASSAY OF TROPONIN QUANT: CPT

## 2020-10-08 PROCEDURE — 72125 CT NECK SPINE W/O DYE: CPT

## 2020-10-08 PROCEDURE — 87040 BLOOD CULTURE FOR BACTERIA: CPT

## 2020-10-08 PROCEDURE — 87150 DNA/RNA AMPLIFIED PROBE: CPT

## 2020-10-08 PROCEDURE — 93005 ELECTROCARDIOGRAM TRACING: CPT | Performed by: STUDENT IN AN ORGANIZED HEALTH CARE EDUCATION/TRAINING PROGRAM

## 2020-10-08 PROCEDURE — 99285 EMERGENCY DEPT VISIT HI MDM: CPT

## 2020-10-08 PROCEDURE — 83605 ASSAY OF LACTIC ACID: CPT

## 2020-10-08 PROCEDURE — 85025 COMPLETE CBC W/AUTO DIFF WBC: CPT

## 2020-10-08 PROCEDURE — 70450 CT HEAD/BRAIN W/O DYE: CPT

## 2020-10-08 PROCEDURE — 73502 X-RAY EXAM HIP UNI 2-3 VIEWS: CPT

## 2020-10-08 PROCEDURE — 71045 X-RAY EXAM CHEST 1 VIEW: CPT

## 2020-10-08 RX ORDER — 0.9 % SODIUM CHLORIDE 0.9 %
500 INTRAVENOUS SOLUTION INTRAVENOUS ONCE
Status: COMPLETED | OUTPATIENT
Start: 2020-10-08 | End: 2020-10-09

## 2020-10-08 RX ORDER — 0.9 % SODIUM CHLORIDE 0.9 %
1000 INTRAVENOUS SOLUTION INTRAVENOUS ONCE
Status: DISCONTINUED | OUTPATIENT
Start: 2020-10-08 | End: 2020-10-08

## 2020-10-08 RX ORDER — DOXYCYCLINE 100 MG/1
100 TABLET ORAL 2 TIMES DAILY
Status: ON HOLD | COMMUNITY
End: 2020-10-10 | Stop reason: HOSPADM

## 2020-10-08 RX ORDER — POTASSIUM CHLORIDE 750 MG/1
10 TABLET, EXTENDED RELEASE ORAL SEE ADMIN INSTRUCTIONS
Status: ON HOLD | COMMUNITY
End: 2020-10-09

## 2020-10-08 RX ADMIN — SODIUM CHLORIDE 500 ML: 9 INJECTION, SOLUTION INTRAVENOUS at 22:33

## 2020-10-08 ASSESSMENT — PAIN DESCRIPTION - PAIN TYPE: TYPE: ACUTE PAIN

## 2020-10-08 ASSESSMENT — PAIN SCALES - GENERAL: PAINLEVEL_OUTOF10: 5

## 2020-10-08 ASSESSMENT — PAIN DESCRIPTION - LOCATION: LOCATION: HEAD

## 2020-10-08 ASSESSMENT — PAIN DESCRIPTION - DESCRIPTORS: DESCRIPTORS: ACHING

## 2020-10-08 ASSESSMENT — PAIN DESCRIPTION - FREQUENCY: FREQUENCY: CONTINUOUS

## 2020-10-09 PROBLEM — R55 SYNCOPE AND COLLAPSE: Status: ACTIVE | Noted: 2020-10-09

## 2020-10-09 LAB
EKG ATRIAL RATE: 91 BPM
EKG DIAGNOSIS: NORMAL
EKG P AXIS: 32 DEGREES
EKG P-R INTERVAL: 192 MS
EKG Q-T INTERVAL: 326 MS
EKG QRS DURATION: 70 MS
EKG QTC CALCULATION (BAZETT): 400 MS
EKG R AXIS: 23 DEGREES
EKG T AXIS: 198 DEGREES
EKG VENTRICULAR RATE: 91 BPM
SARS-COV-2, NAAT: NOT DETECTED
TROPONIN: 0.13 NG/ML
TROPONIN: 0.14 NG/ML

## 2020-10-09 PROCEDURE — 36415 COLL VENOUS BLD VENIPUNCTURE: CPT

## 2020-10-09 PROCEDURE — U0002 COVID-19 LAB TEST NON-CDC: HCPCS

## 2020-10-09 PROCEDURE — 2580000003 HC RX 258: Performed by: INTERNAL MEDICINE

## 2020-10-09 PROCEDURE — 93010 ELECTROCARDIOGRAM REPORT: CPT | Performed by: INTERNAL MEDICINE

## 2020-10-09 PROCEDURE — 5A1D70Z PERFORMANCE OF URINARY FILTRATION, INTERMITTENT, LESS THAN 6 HOURS PER DAY: ICD-10-PCS | Performed by: INTERNAL MEDICINE

## 2020-10-09 PROCEDURE — 2580000003 HC RX 258

## 2020-10-09 PROCEDURE — 84484 ASSAY OF TROPONIN QUANT: CPT

## 2020-10-09 PROCEDURE — 99232 SBSQ HOSP IP/OBS MODERATE 35: CPT | Performed by: INTERNAL MEDICINE

## 2020-10-09 PROCEDURE — 6370000000 HC RX 637 (ALT 250 FOR IP): Performed by: INTERNAL MEDICINE

## 2020-10-09 PROCEDURE — 2060000000 HC ICU INTERMEDIATE R&B

## 2020-10-09 PROCEDURE — 90935 HEMODIALYSIS ONE EVALUATION: CPT

## 2020-10-09 PROCEDURE — 6360000002 HC RX W HCPCS: Performed by: INTERNAL MEDICINE

## 2020-10-09 RX ORDER — SODIUM CHLORIDE 0.9 % (FLUSH) 0.9 %
10 SYRINGE (ML) INJECTION PRN
Status: DISCONTINUED | OUTPATIENT
Start: 2020-10-09 | End: 2020-10-10 | Stop reason: HOSPADM

## 2020-10-09 RX ORDER — MIRTAZAPINE 15 MG/1
15 TABLET, FILM COATED ORAL NIGHTLY
Status: DISCONTINUED | OUTPATIENT
Start: 2020-10-09 | End: 2020-10-10 | Stop reason: HOSPADM

## 2020-10-09 RX ORDER — PANTOPRAZOLE SODIUM 40 MG/1
40 TABLET, DELAYED RELEASE ORAL DAILY
COMMUNITY

## 2020-10-09 RX ORDER — ARIPIPRAZOLE 10 MG/1
10 TABLET ORAL DAILY
Status: DISCONTINUED | OUTPATIENT
Start: 2020-10-09 | End: 2020-10-10 | Stop reason: HOSPADM

## 2020-10-09 RX ORDER — ASPIRIN 81 MG/1
81 TABLET ORAL DAILY
Status: DISCONTINUED | OUTPATIENT
Start: 2020-10-09 | End: 2020-10-10 | Stop reason: HOSPADM

## 2020-10-09 RX ORDER — MIDODRINE HYDROCHLORIDE 5 MG/1
5 TABLET ORAL
Status: DISCONTINUED | OUTPATIENT
Start: 2020-10-09 | End: 2020-10-10 | Stop reason: HOSPADM

## 2020-10-09 RX ORDER — ACETAMINOPHEN 325 MG/1
650 TABLET ORAL EVERY 6 HOURS PRN
Status: DISCONTINUED | OUTPATIENT
Start: 2020-10-09 | End: 2020-10-10 | Stop reason: HOSPADM

## 2020-10-09 RX ORDER — SODIUM CHLORIDE 9 MG/ML
INJECTION, SOLUTION INTRAVENOUS
Status: COMPLETED
Start: 2020-10-09 | End: 2020-10-09

## 2020-10-09 RX ORDER — SODIUM CHLORIDE 0.9 % (FLUSH) 0.9 %
10 SYRINGE (ML) INJECTION EVERY 12 HOURS SCHEDULED
Status: DISCONTINUED | OUTPATIENT
Start: 2020-10-09 | End: 2020-10-10 | Stop reason: HOSPADM

## 2020-10-09 RX ORDER — PROMETHAZINE HYDROCHLORIDE 25 MG/1
12.5 TABLET ORAL EVERY 6 HOURS PRN
Status: DISCONTINUED | OUTPATIENT
Start: 2020-10-09 | End: 2020-10-10 | Stop reason: HOSPADM

## 2020-10-09 RX ORDER — HEPARIN SODIUM 5000 [USP'U]/ML
5000 INJECTION, SOLUTION INTRAVENOUS; SUBCUTANEOUS EVERY 8 HOURS SCHEDULED
Status: DISCONTINUED | OUTPATIENT
Start: 2020-10-09 | End: 2020-10-10 | Stop reason: HOSPADM

## 2020-10-09 RX ORDER — POLYETHYLENE GLYCOL 3350 17 G/17G
17 POWDER, FOR SOLUTION ORAL DAILY PRN
Status: DISCONTINUED | OUTPATIENT
Start: 2020-10-09 | End: 2020-10-10 | Stop reason: HOSPADM

## 2020-10-09 RX ORDER — ACETAMINOPHEN 650 MG/1
650 SUPPOSITORY RECTAL EVERY 6 HOURS PRN
Status: DISCONTINUED | OUTPATIENT
Start: 2020-10-09 | End: 2020-10-10 | Stop reason: HOSPADM

## 2020-10-09 RX ORDER — ONDANSETRON 2 MG/ML
4 INJECTION INTRAMUSCULAR; INTRAVENOUS EVERY 6 HOURS PRN
Status: DISCONTINUED | OUTPATIENT
Start: 2020-10-09 | End: 2020-10-10 | Stop reason: HOSPADM

## 2020-10-09 RX ORDER — PRAVASTATIN SODIUM 40 MG
40 TABLET ORAL DAILY
Status: DISCONTINUED | OUTPATIENT
Start: 2020-10-09 | End: 2020-10-10 | Stop reason: HOSPADM

## 2020-10-09 RX ORDER — HEPARIN SODIUM 1000 [USP'U]/ML
3800 INJECTION, SOLUTION INTRAVENOUS; SUBCUTANEOUS PRN
Status: DISCONTINUED | OUTPATIENT
Start: 2020-10-09 | End: 2020-10-10 | Stop reason: HOSPADM

## 2020-10-09 RX ORDER — RALOXIFENE HYDROCHLORIDE 60 MG/1
60 TABLET, FILM COATED ORAL DAILY
Status: DISCONTINUED | OUTPATIENT
Start: 2020-10-09 | End: 2020-10-10 | Stop reason: HOSPADM

## 2020-10-09 RX ORDER — LEVETIRACETAM 500 MG/1
500 TABLET ORAL 2 TIMES DAILY
Status: DISCONTINUED | OUTPATIENT
Start: 2020-10-09 | End: 2020-10-10 | Stop reason: HOSPADM

## 2020-10-09 RX ORDER — MIDODRINE HYDROCHLORIDE 5 MG/1
5 TABLET ORAL PRN
Status: DISCONTINUED | OUTPATIENT
Start: 2020-10-09 | End: 2020-10-09

## 2020-10-09 RX ADMIN — LEVETIRACETAM 500 MG: 500 TABLET ORAL at 21:25

## 2020-10-09 RX ADMIN — HEPARIN SODIUM 5000 UNITS: 5000 INJECTION INTRAVENOUS; SUBCUTANEOUS at 06:06

## 2020-10-09 RX ADMIN — LEVETIRACETAM 500 MG: 500 TABLET ORAL at 09:11

## 2020-10-09 RX ADMIN — SODIUM CHLORIDE 1000 ML: 9 INJECTION, SOLUTION INTRAVENOUS at 14:14

## 2020-10-09 RX ADMIN — MIRTAZAPINE 15 MG: 15 TABLET, FILM COATED ORAL at 21:25

## 2020-10-09 RX ADMIN — MIDODRINE HYDROCHLORIDE 5 MG: 5 TABLET ORAL at 14:11

## 2020-10-09 RX ADMIN — SODIUM CHLORIDE: 9 INJECTION, SOLUTION INTRAVENOUS at 12:00

## 2020-10-09 RX ADMIN — LEVETIRACETAM 500 MG: 500 TABLET ORAL at 02:37

## 2020-10-09 RX ADMIN — PRAVASTATIN SODIUM 40 MG: 40 TABLET ORAL at 09:11

## 2020-10-09 RX ADMIN — MIDODRINE HYDROCHLORIDE 5 MG: 5 TABLET ORAL at 16:26

## 2020-10-09 RX ADMIN — LEVOTHYROXINE SODIUM 75 MCG: 25 TABLET ORAL at 06:06

## 2020-10-09 RX ADMIN — SERTRALINE HYDROCHLORIDE 50 MG: 50 TABLET ORAL at 09:11

## 2020-10-09 RX ADMIN — ASPIRIN 81 MG: 81 TABLET, COATED ORAL at 09:11

## 2020-10-09 RX ADMIN — ARIPIPRAZOLE 10 MG: 10 TABLET ORAL at 09:11

## 2020-10-09 RX ADMIN — EPOETIN ALFA-EPBX 6000 UNITS: 3000 INJECTION, SOLUTION INTRAVENOUS; SUBCUTANEOUS at 14:13

## 2020-10-09 RX ADMIN — EPOETIN ALFA-EPBX 1000 UNITS: 2000 INJECTION, SOLUTION INTRAVENOUS; SUBCUTANEOUS at 14:11

## 2020-10-09 RX ADMIN — RALOXIFENE HYDROCHLORIDE 60 MG: 60 TABLET, FILM COATED ORAL at 09:11

## 2020-10-09 RX ADMIN — Medication 10 ML: at 09:11

## 2020-10-09 RX ADMIN — HEPARIN SODIUM 5000 UNITS: 5000 INJECTION INTRAVENOUS; SUBCUTANEOUS at 21:25

## 2020-10-09 ASSESSMENT — PAIN SCALES - GENERAL
PAINLEVEL_OUTOF10: 0

## 2020-10-09 NOTE — ED NOTES
200 Perfect served hospitalist for admission per Dr. Elida Wilson put in orders while in ED     [facility]  10/08/20 2329       Cici Verma  10/09/20 0039

## 2020-10-09 NOTE — H&P
Hospital Medicine History & Physical      PCP: Cari Humphrey MD    Date of Admission: 10/8/2020    Date of Service: Pt seen/examined on 10/9/2020    Chief Complaint: Syncope    History Of Present Illness:    72 y.o. female who presented to to the hospital from her nursing home after falling and hitting the back of her head. The patient is a poor historian and has Alzheimer's dementia. She apparently blacked out and hit her head. Denies any preceding symptoms. She gets hemodialysis via a tunneled dialysis catheter. Her dialysis days are Monday Wednesday and Friday. On presentation to the emergency department she was not markedly hypotensive and was bolused 500 cc of IV fluids. When I saw her she was awake and alert denied any symptoms. She will be admitted for observation.     Past Medical History:        Diagnosis Date    Anemia     Cognitive communication deficit     Convulsions ([address]     Depressive disorder     ESRD (end stage renal disease) ([address]     Hemodialysis patient ([address]     Hyperlipidemia     Muscle wasting     Osteomyelitis of lumbar spine ([address]     Pacemaker     medtronic    Sepsis ([address]     Thyroid disease        Past Surgical History:          Procedure Laterality Date    COLONOSCOPY N/A 7/22/2020    COLONOSCOPY WITH BIOPSY performed by Yareli Epps MD at [address] [facility] Left 9/17/2020    LEFT UPPER EXTREMITY DIALYSIS GRAFT performed by Kristin Santamaria MD at [address] [facility]  7/24/2020    IR NONTUNNELED VASCULAR CATHETER 7/24/2020 [facility] PROC[facility]    IR TUNNELED CATHETER PLACEMENT GREATER THAN 5 YEARS  7/28/2020    IR TUNNELED CATHETER PLACEMENT GREATER THAN 5 YEARS 7/28/2020 [facility] PROCED[facility]    NV THROMBECTOMY,AV FISTULA DIALYS GRFT Left 9/21/2020    THROMBECTOMY, ANGIOGRAM AND REVISION USING DISTAL INFLOW OF DIALYSIS GRAFT performed by Kristin Santamaria MD at [address] [place] [address] GASTROINTESTINAL ENDOSCOPY N/A 7/22/2020    EGD BIOPSY performed by Faarz Russell MD at [address] [facility]       Medications Prior to Admission:      Prior to Admission medications    Medication Sig Start Date End Date Taking?  Authorizing Provider   potassium chloride (KLOR-CON M) 10 MEQ extended release tablet Take 10 mEq by mouth See Admin Instructions One tablet by mouth 3 times weekly on Monday , Wednesday and Friday  (dialysis days only)   Yes Historical Provider, MD   doxycycline monohydrate (ADOXA) 100 MG tablet Take 100 mg by mouth 2 times daily   Yes Historical Provider, MD   mirtazapine (REMERON) 15 MG tablet Take 15 mg by mouth nightly   Yes Historical Provider, MD   potassium chloride (KLOR-CON) 10 MEQ extended release tablet Take 10 mEq by mouth daily Monday Wednesday Friday dialysis days only   Yes Historical Provider, MD   midodrine (PROAMATINE) 5 MG tablet Take 1 tablet by mouth as needed (HD: Give pre and mid tx for Sbp <100) 7/28/20  Yes Malika Javed MD   bismuth subsalicylate (PEPTO BISMOL) 525 MG/15ML suspension Take 30 mLs by mouth every 8 hours as needed for Indigestion   Yes Historical Provider, MD   lactulose (CHRONULAC) 10 GM/15ML solution Take 20 g by mouth daily as needed   Yes Historical Provider, MD   levothyroxine (SYNTHROID) 75 MCG tablet Take 75 mcg by mouth Daily   Yes Historical Provider, MD   ARIPiprazole (ABILIFY) 10 MG tablet Take 10 mg by mouth daily   Yes Historical Provider, MD   aspirin 81 MG EC tablet Take 81 mg by mouth daily   Yes Historical Provider, MD   docusate sodium (COLACE) 100 MG capsule Take 100 mg by mouth daily as needed    Yes Historical Provider, MD   isosorbide mononitrate (IMDUR) 30 MG extended release tablet Take 30 mg by mouth daily   Yes Historical Provider, MD   levETIRAcetam (KEPPRA) 500 MG tablet Take 500 mg by mouth 2 times daily   Yes Historical Provider, MD   raloxifene (EVISTA) 60 MG tablet Take 60 mg by mouth daily   Yes Historical Provider, MD   senna (SENOKOT) 8.6 MG tablet Take 1 tablet by mouth as needed    Yes Historical Provider, MD   vitamin C (ASCORBIC ACID) 500 MG tablet Take 500 mg by mouth daily   Yes Historical Provider, MD   sertraline (ZOLOFT) 50 MG tablet Take 50 mg by mouth daily   Yes Historical Provider, MD   pravastatin (PRAVACHOL) 40 MG tablet Take 40 mg by mouth daily   Yes Historical Provider, MD   acetaminophen (TYLENOL) 500 MG tablet Take 500 mg by mouth every 4 hours as needed for Pain   Yes Historical Provider, MD   ondansetron (ZOFRAN) 4 MG tablet Take 4 mg by mouth every 6 hours as needed for Nausea or Vomiting   Yes Historical Provider, MD       Allergies:  Penicillins    Social History:      TOBACCO:   reports that she has never smoked. She has never used smokeless tobacco.  ETOH:   reports previous alcohol use. Family History:    Nonpertinent to current admission    REVIEW OF SYSTEMS:   Could not obtain given patient's mental status    PHYSICAL EXAM:  BP 93/61   Pulse 99   Temp 98.5 °F (36.9 °C) (Oral)   Resp 15   Ht 4' 8.5\" (1.435 m)   Wt 75 lb (34 kg)   SpO2 93%   BMI 16.52 kg/m²     General appearance: Very frail and thin elderly female  HEENT: NCAT  Neck: Supple, with full range of motion. No jugular venous distention. Trachea midline. Respiratory:  Normal respiratory effort. Clear to auscultation, bilaterally without Rales/Wheezes/Rhonchi. Cardiovascular:  Regular rate and rhythm with normal S1/S2 without murmurs, rubs or gallops. Abdomen: Soft, non-tender, non-distended with normal bowel sounds. Musculoskeletal:  No clubbing, cyanosis or edema bilaterally. Full range of motion without deformity. Skin: Skin color, texture, turgor normal.  No rashes or lesions. Neurologic:  Neurovascularly intact without any focal sensory/motor deficits.  Cranial nerves: II-XII intact, grossly non-focal.  Psychiatric:  Alert and oriented, thought content appropriate, normal insight  Capillary Refill: Brisk,< 3 seconds   Peripheral Pulses: +2 palpable, equal bilaterally       Labs:   Recent Labs     10/08/20  2133   WBC 9.1   HGB 10.6*   HCT 34.9*   *     Recent Labs     10/08/20  2133   *   K 4.0   CL 99   CO2 23   BUN 27*   CREATININE 3.7*   CALCIUM 8.8     Recent Labs     10/08/20  2133   AST 60*   ALT 20   BILITOT 0.7   ALKPHOS 653*     No results for input(s): INR in the last 72 hours. Recent Labs     10/08/20  2133   TROPONINI 0.15*       Urinalysis:   Lab Results   Component Value Date    NITRU Negative 07/17/2020    WBCUA 21-50 07/17/2020    BACTERIA 3+ 07/17/2020    RBCUA  07/17/2020    BLOODU LARGE 07/17/2020    SPECGRAV 1.025 07/17/2020    GLUCOSEU Negative 07/17/2020       Radiology:   EKG:  I have reviewed the EKG with the following interpretation: Normal sinus rhythm no acute ST or T wave changes    XR HIP LEFT (2-3 VIEWS)   Final Result   No acute osseous abnormality. Given the degree of osteopenia, nondisplaced   fractures may be radiographically occult. If pain or concern for fracture   persists, consider MR imaging. XR CHEST PORTABLE   Final Result   Suspect small left effusion. Mild vascular congestion. CT head without contrast   Final Result   No acute CT abnormality identified in the brain. No acute osseous abnormality identified in the cervical spine. CT Cervical Spine WO Contrast   Final Result   No acute CT abnormality identified in the brain. No acute osseous abnormality identified in the cervical spine. ASSESSMENT:  Syncope, likely secondary to hypotension  ESRD on hemodialysis  Chronic hypotension on Midodrine  Underweight  Seizure disorder  Sick sinus syndrome, pacer in place    PLAN:  Bolused 500 cc of normal saline, start midodrine  -Nephrology consult for chronic dialysis  -Elevated troponin likely secondary to hypotension renal disease, rule out ACS.   Patient is apparently DNR CC, will confirm and a.m.  -Resume rest of home medications including Keppra  -Admit to telemetry    DVT Prophylaxis: Heparin  Diet: No diet orders on file  Code Status: Prior    Dispo -admit to [facility] on telemetry. Thank you for the opportunity to be involved in this patient's care.       (Please note that portions of this note were completed with a voice recognition program. Efforts were made to edit the dictations but occasionally words are mis-transcribed.)

## 2020-10-09 NOTE — ED NOTES
Verbal report given to [facility]     Kristina Asher, BHARTI  10/08/20 4937

## 2020-10-09 NOTE — ED NOTES
Bed: 04  Expected date:   Expected time:   Means of arrival:   Comments:  levy Carrillo [place]  10/08/20 2044

## 2020-10-09 NOTE — ED NOTES
Pt report given to BHARTI [facility]. Pt taken to floor via stretcher by same.       Carly Bender RN  10/09/20 1846

## 2020-10-09 NOTE — PLAN OF CARE
Problem: Falls - Risk of:  Goal: Will remain free from falls  Description: Will remain free from falls  10/9/2020 1103 by Sury Salter RN  Outcome: Ongoing  10/9/2020 0223 by Dilshad Robertson  Outcome: Ongoing  Goal: Absence of physical injury  Description: Absence of physical injury  10/9/2020 1103 by Sury Salter RN  Outcome: Ongoing  10/9/2020 0223 by Dilshda Robertson  Outcome: Ongoing     Problem: Infection:  Goal: Will remain free from infection  Description: Will remain free from infection  10/9/2020 1103 by Sury Salter RN  Outcome: Ongoing  10/9/2020 0223 by Dilshad Robertson  Outcome: Ongoing     Problem: Safety:  Goal: Free from accidental physical injury  Description: Free from accidental physical injury  10/9/2020 1103 by Sury Salter RN  Outcome: Ongoing  10/9/2020 0223 by Dilshad Robertson  Outcome: Ongoing  Goal: Free from intentional harm  Description: Free from intentional harm  10/9/2020 1103 by Sury Salter RN  Outcome: Ongoing  10/9/2020 0223 by Dilshad Robertson  Outcome: Ongoing     Problem: Daily Care:  Goal: Daily care needs are met  Description: Daily care needs are met  10/9/2020 1103 by Sury Salter RN  Outcome: Ongoing  10/9/2020 0223 by Dilshad Robertson  Outcome: Ongoing     Problem: Pain:  Goal: Patient's pain/discomfort is manageable  Description: Patient's pain/discomfort is manageable  10/9/2020 1103 by Sury Salter RN  Outcome: Ongoing  10/9/2020 0223 by Dilshad Robertson  Outcome: Ongoing     Problem: Skin Integrity:  Goal: Skin integrity will stabilize  Description: Skin integrity will stabilize  10/9/2020 1103 by Sury Salter RN  Outcome: Ongoing  10/9/2020 0223 by Dilshad Robertson  Outcome: Ongoing     Problem: Skin Integrity:  Goal: Will show no infection signs and symptoms  Description: Will show no infection signs and symptoms  Outcome: Ongoing  Goal: Absence of new skin breakdown  Description: Absence of new skin breakdown  Outcome: Ongoing     Problem: Discharge Planning:  Goal: Patients continuum of care needs are met  Description: Patients continuum of care needs are met  10/9/2020 1103 by Jack Awad RN  Outcome: Ongoing  10/9/2020 0223 by Annmarie Kerr  Outcome: Ongoing

## 2020-10-09 NOTE — FLOWSHEET NOTE
10/09/20 0900   Vital Signs   Temp 97.3 °F (36.3 °C)   Temp Source Axillary   Pulse 90   Heart Rate Source Monitor   Resp 16   BP (!) 93/59   BP Location Right upper arm   BP Upper/Lower Upper   Patient Position Supine   Oxygen Therapy   SpO2 99 %   O2 Device None (Room air)   AM assessment complete. Vital signs stable. Orthostats down to 77/54 when standing. 93/59 at rest in bed. Pt states she knows no info about pacemaker, but came [facility] not too long ago from a [facility] home. RN will try to contact. No other needs identified at this time. Will continue to monitor.

## 2020-10-09 NOTE — FLOWSHEET NOTE
10/09/20 1626   Vital Signs   Temp 97.3 °F (36.3 °C)   Temp Source Oral   Pulse 94   Heart Rate Source Monitor   Resp 16   /79   BP Location Right upper arm   BP Upper/Lower Upper   Patient Position Sitting   Oxygen Therapy   SpO2 96 %   O2 Device None (Room air)   Pt returned from dialysis. Alert and oriented to person, place, not time. VSS. Pt sitting up eating dinner. MD updated that pacemaker interrogation results now in paper chart. No needs at this time. Will continue to monitor.

## 2020-10-09 NOTE — PLAN OF CARE
IM Progress Note    Admit Date:  10/8/2020  1    Interval history:  Admitted for syncope    Subjective:  Ms. Leslie Banuelos does not remember anything , feels fine today     Objective:   BP (!) 88/59   Pulse 95   Temp 99.5 °F (37.5 °C) (Oral)   Resp 16   Ht 4' 8\" (1.422 m)   Wt 78 lb (35.4 kg)   SpO2 97%   BMI 17.49 kg/m²       Intake/Output Summary (Last 24 hours) at 10/10/2020 0831  Last data filed at 10/9/2020 1549  Gross per 24 hour   Intake 1200 ml   Output 900 ml   Net 300 ml       Physical Exam:        General: elderly thin female, underweight    Awake, alert and oriented. Appears to be not in any distress  Mucous Membranes:  Pink , anicteric  HEENT - superficial bruise to right temporal region   Neck: No JVD, no carotid bruit, no thyromegaly  Chest:  Clear to auscultation bilaterally, no added sounds, left sided pacer  Cardiovascular:  RRR S1S2 heard, no murmurs or gallops  Abdomen:  Soft, undistended, non tender, no organomegaly, BS present  Extremities:   healing AV fistula site on left elbow No edema or cyanosis.  Distal pulses well felt  Neurological : grossly normal        Medications:   Scheduled Medications:    ARIPiprazole  10 mg Oral Daily    aspirin  81 mg Oral Daily    levETIRAcetam  500 mg Oral BID    levothyroxine  75 mcg Oral Daily    mirtazapine  15 mg Oral Nightly    pravastatin  40 mg Oral Daily    raloxifene  60 mg Oral Daily    sertraline  50 mg Oral Daily    sodium chloride flush  10 mL Intravenous 2 times per day    heparin (porcine)  5,000 Units Subcutaneous 3 times per day    midodrine  5 mg Oral TID WC    epoetin jj-epbx  6,000 Units Intravenous Once per day on Mon Wed Fri    And    epoetin jj-epbx  1,000 Units Intravenous Once per day on Mon Wed Fri     I  perflutren lipid microspheres, sodium chloride flush, acetaminophen **OR** acetaminophen, polyethylene glycol, promethazine **OR** ondansetron, heparin (porcine)    Lab Data:  Recent Labs     10/08/20  2133   WBC 9.1 HGB 10.6*   HCT 34.9*   .7*   *     Recent Labs     10/08/20  2133   *   K 4.0   CL 99   CO2 23   BUN 27*   CREATININE 3.7*     Recent Labs     10/09/20  0220 10/09/20  0812   TROPONINI 0.14* 0.13*       Coagulation:   Lab Results   Component Value Date    INR 1.53 09/21/2020    APTT 31.4 09/21/2020     Cardiac markers:   Lab Results   Component Value Date    TROPONINI 0.13 10/09/2020         Lab Results   Component Value Date    ALT 20 10/08/2020    AST 60 (H) 10/08/2020    ALKPHOS 653 (H) 10/08/2020    BILITOT 0.7 10/08/2020       Lab Results   Component Value Date    INR 1.53 (H) 09/21/2020    INR 1.20 (H) 07/28/2020    INR 1.42 (H) 07/24/2020    PROTIME 17.5 (H) 09/21/2020    PROTIME 13.9 (H) 07/28/2020    PROTIME 16.5 (H) 07/24/2020       Radiology    Radiology:   EKG:  I have reviewed the EKG with the following interpretation: Normal sinus rhythm no acute ST or T wave changes     XR HIP LEFT (2-3 VIEWS)   Final Result   No acute osseous abnormality. Given the degree of osteopenia, nondisplaced   fractures may be radiographically occult. If pain or concern for fracture   persists, consider MR imaging.           XR CHEST PORTABLE   Final Result   Suspect small left effusion. Mild vascular congestion.           CT head without contrast   Final Result   No acute CT abnormality identified in the brain.       No acute osseous abnormality identified in the cervical spine.           CT Cervical Spine WO Contrast   Final Result   No acute CT abnormality identified in the brain.       No acute osseous abnormality identified in the cervical spine.             Assessment & Plan:      Syncope, likely secondary to hypotension-   pt stood up for radio and passed out at [facility]. No trauma noted, pt does not remember events. Ct head neg.  EKG neg   Hypotension noted on admission which improved with IVF  Avoid hypotension   - resume midodrine  Has pacer for sss, but no echo in epic , obtain one    Sick sinus syndrome, pacer in place     ESRD on hemodialysis- electrolytes stable.  Consulted nephrology   Elevated troponin likely secondary to hypotension renal disease, no chest pain     Chronic hypotension on Midodrine-     Underweight    Seizure disorder- on keppra    DVT Prophylaxis: Heparin  Diet: No diet orders on file  Code Status: Prior       Flora Hinojosa MD

## 2020-10-09 NOTE — ED PROVIDER NOTES
Frail and ill looking  HEAD: Lump on the back of the head but normocephalic   EYES: PERRL, No injection, discharge or scleral icterus. ENT: Moist mucous membranes. NECK: Normal ROM, NO LAD   CARDIOVASCULAR: Regular rate and rhythm. No murmurs or gallop. PULMONARY/CHEST: Airway patent. No retractions. Breath sounds clear with good air entry bilaterally. ABDOMEN: Soft, Non-distended and non-tender, without guarding or rebound. SKIN: Acyanotic, warm, dry   MUSCULOSKELETAL: Left upper extremity swollen from dialysis site but tenderness or deformity   NEUROLOGICAL: Awake and oriented x 3. Pulses intact. Grossly nonfocal   Nursing note and vitals reviewed.      ED Course & Medical Decision Making   Medications   0.9 % sodium chloride bolus (500 mLs Intravenous New Bag 10/8/20 2233)      Labs Reviewed   CBC WITH AUTO DIFFERENTIAL - Abnormal; Notable for the following components:       Result Value    RBC 3.43 (*)     Hemoglobin 10.6 (*)     Hematocrit 34.9 (*)     .7 (*)     MCHC 30.5 (*)     RDW 20.4 (*)     Platelets 521 (*)     All other components within normal limits    Narrative:     Performed at:  [facility],  [employer], [employer]   Phone [phone]   COMPREHENSIVE METABOLIC PANEL W/ REFLEX TO MG FOR LOW K - Abnormal; Notable for the following components:    Sodium 132 (*)     Glucose 123 (*)     BUN 27 (*)     CREATININE 3.7 (*)     GFR Non- 12 (*)     GFR  15 (*)     Total Protein 5.6 (*)     Alb 2.3 (*)     Albumin/Globulin Ratio 0.7 (*)     Alkaline Phosphatase 653 (*)     AST 60 (*)     All other components within normal limits    Narrative:     Performed at:  [facility]) - [facility],  [employer], [employer]   Phone [phone]   TROPONIN - Abnormal; Notable for the following components:    Troponin 0.15 (*)     All other components within normal limits    Narrative: renal disease with syncope concerning for low perfusion causing endorgan damage. Patient treated urgently in the ED with resuscitation. Case discussed with consultants.       _________________________________________________________________________________________  This record is transcribed utilizing voice recognition technology. There are inherent limitations in this technology. In addition, there may be limitations in editing of this report. If there are any discrepancies, please contact me directly.         Charles Chavez MD  10/08/20 8544

## 2020-10-09 NOTE — FLOWSHEET NOTE
Treatment time: 3 hrs    Net UF: 0    Pre weight: 34.7kg standing  Post weight: 35.1kg  EDW: 35kg     Access used: Rt IJ TDC  Access function: Good    Medications or blood products given: Retacrit 7000units, Midodrine 5mg, Heparin dwells    Regular outpatient schedule: MWF    Summary of response to treatment:      10/09/20 1549   Vital Signs   /64   Temp 97.9 °F (36.6 °C)   Pulse 83   Resp 16   Weight 77 lb 6.1 oz (35.1 kg)   Weight Method Standing scale   Percent Weight Change 1.15   Dry Weight 77 lb 2.6 oz (35 kg)   Pain Assessment   Pain Assessment 0-10   Pain Level 0   Post-Hemodialysis Assessment   Post-Treatment Procedures Blood returned;Catheter capped, clamped and heparinized x 2 ports   Machine Disinfection Process Acid/Vinegar Clean;Heat Disinfect; Exterior Machine Disinfection   Rinseback Volume (ml) 400 ml   Total Liters Processed (l/min) 51.1 l/min   Dialyzer Clearance Lightly streaked   Duration of Treatment (minutes) 180 minutes   Heparin amount administered during treatment (units) 0 units   Hemodialysis Intake (ml) 1200 ml   Hemodialysis Output (ml) 900 ml   NET Removed (ml) 0 ml   Tolerated Treatment Fair   Patient Response to Treatment Sbp . Pt denies distress. No c/o's voiced. Bilateral Breath Sounds Clear   Edema Left upper extremity   LUE Edema Trace   Physician Notified? Yes   Copy of dialysis treatment record placed in chart, to be scanned into EMR.

## 2020-10-09 NOTE — CONSULTS
[facility]    Consult Note           Reason for Consult: ESRD  Requesting Physician:  Dr. Dalton Book for    Chief Complaint:    Chief Complaint   Patient presents with    Loss of Consciousness     Patient arrived via EMS, patient was standing up to change radio and she states she fell backward striking head. Patient does not remember event. c/o headache now after fall. History of Present Illness on 10/9/2020:    72 y.o. yo female with PMH of ESRD, hyperlipidemia, depression, who is admitted for syncope  Patient reports that she got up to change the dialysis for radio when she suddenly lost consciousness and fell down and hit her head. In the ER her blood pressure had dropped down to 82/57 for which patient received 500 mL normal saline bolus.   Since then her blood pressure has been in the low 90s  She continues to be orthostatic this morning    Past Medical History:        Diagnosis Date    Acute kidney failure (HCC)     Anemia     Bradycardia     Cognitive communication deficit     Convulsions ([address]     Depressive disorder     ESRD (end stage renal disease) ([address]     Hemodialysis patient ([address]     Hx of blood clots     dvt lower legs    Hyperlipidemia     Hypertension     Muscle wasting     Osteomyelitis of lumbar spine ([address]     Pacemaker     [employer] verified 10/9/2020    Rhinitis, allergic     Seizure ([address]     Sepsis ([address]     Thyroid disease        Past Surgical History:        Procedure Laterality Date    COLONOSCOPY N/A 7/22/2020    COLONOSCOPY WITH BIOPSY performed by Edilma Mallory MD at [facility] Left 9/17/2020    LEFT UPPER EXTREMITY DIALYSIS GRAFT performed by Cherelle Blount MD at [facility] IR NONTUNNELED VASCULAR CATHETER  7/24/2020    IR NONTUNNELED VASCULAR CATHETER 7/24/2020 [facility] SPECIAL PROCEDURES    IR TUNNELED CATHETER PLACEMENT GREATER THAN 5 YEARS  7/28/2020    IR TUNNELED CATHETER PLACEMENT GREATER THAN 5 YEARS 7/28/2020 [facility] PROCED[facility]    PACEMAKER PLACEMENT      MT THROMBECTOMY,AV FISTULA DIALYS GRFT Left 9/21/2020    THROMBECTOMY, ANGIOGRAM AND REVISION USING DISTAL INFLOW OF DIALYSIS GRAFT performed by Sierra Dwyer MD at [facility]. N/A 7/22/2020    EGD BIOPSY performed by Antonio Sinha MD at [address] [address] Medications:    No current facility-administered medications on file prior to encounter.       Current Outpatient Medications on File Prior to Encounter   Medication Sig Dispense Refill    pantoprazole (PROTONIX) 40 MG tablet Take 40 mg by mouth daily      Probiotic Product (ACIDOPHILUS PROBIOTIC BLEND PO) Take 1 tablet by mouth daily      doxycycline monohydrate (ADOXA) 100 MG tablet Take 100 mg by mouth 2 times daily      mirtazapine (REMERON) 15 MG tablet Take 15 mg by mouth nightly      potassium chloride (KLOR-CON) 10 MEQ extended release tablet Take 10 mEq by mouth daily Monday Wednesday Friday dialysis days only      midodrine (PROAMATINE) 5 MG tablet Take 1 tablet by mouth as needed (HD: Give pre and mid tx for Sbp <100) 90 tablet 3    bismuth subsalicylate (PEPTO BISMOL) 525 MG/15ML suspension Take 30 mLs by mouth every 8 hours as needed for Indigestion      levothyroxine (SYNTHROID) 75 MCG tablet Take 75 mcg by mouth Daily      ARIPiprazole (ABILIFY) 10 MG tablet Take 10 mg by mouth daily      aspirin 81 MG EC tablet Take 81 mg by mouth daily      docusate sodium (COLACE) 100 MG capsule Take 100 mg by mouth daily as needed       isosorbide mononitrate (IMDUR) 30 MG extended release tablet Take 30 mg by mouth daily Hold on dialysis days      levETIRAcetam (KEPPRA) 500 MG tablet Take 500 mg by mouth 2 times daily      raloxifene (EVISTA) 60 MG tablet Take 60 mg by mouth daily      senna (SENOKOT) 8.6 MG tablet Take 1 tablet by mouth daily as needed for Constipation       vitamin C (ASCORBIC ACID) 500 reviewed and were negative. Physical exam:   Constitutional:  VITALS:  BP 95/62   Pulse 89   Temp 98.2 °F (36.8 °C)   Resp 16   Ht 4' 8\" (1.422 m)   Wt 76 lb 8 oz (34.7 kg)   SpO2 99%   BMI 17.15 kg/m²   Gen: alert, awake, nad  Skin: no rash, turgor wnl  Heent:  eomi, mmm  Neck: no bruits or jvd noted, thyroid normal  Cardiovascular:  S1, S2 without m/r/g  Respiratory: CTA B without w/r/r; respiratory effort normal  Abdomen:  +bs, soft, nt, nd, no hepatosplenomegaly  Ext: no lower extremity edema  Neuro/Psy: AAoriented times 3 ; moves all 4 ext  Musculoskeletal:  Rom, muscular strength intact; digits, nails normal    Data/  Recent Labs     10/08/20  2133   WBC 9.1   HGB 10.6*   HCT 34.9*   .7*   *     Recent Labs     10/08/20  2133   *   K 4.0   CL 99   CO2 23   GLUCOSE 123*   BUN 27*   CREATININE 3.7*   LABGLOM 12*   GFRAA 15*       Assessment  -ESRD on hemodialysis Monday Wednesday Friday at [facility]  -Anemia  -CKD/MBD  -Thrombocytopenia  -Hyponatremia  -Syncope from orthostatic hypotension    Plan  -HD on 10/9 without any fluid removal, will give her some fluid back to increase her target weight of 35 kg from 34  -Continue Midodrine-schedule it will hold for SBP more than 120  -We will defer Further work-up for syncope to primary team  -Renal dose medications  -EPO 7000 units with hemodialysis    Thank you for the consultation. Please do not hesitate to call with questions.     Otilia Luna  [facility]  Office: [phone]  Fax:    [phone]

## 2020-10-10 VITALS
SYSTOLIC BLOOD PRESSURE: 105 MMHG | WEIGHT: 78 LBS | TEMPERATURE: 98.9 F | RESPIRATION RATE: 17 BRPM | DIASTOLIC BLOOD PRESSURE: 62 MMHG | HEART RATE: 89 BPM | BODY MASS INDEX: 17.55 KG/M2 | HEIGHT: 56 IN | OXYGEN SATURATION: 98 %

## 2020-10-10 LAB
LV EF: 60 %
LVEF MODALITY: NORMAL

## 2020-10-10 PROCEDURE — 2580000003 HC RX 258: Performed by: INTERNAL MEDICINE

## 2020-10-10 PROCEDURE — 6360000002 HC RX W HCPCS: Performed by: INTERNAL MEDICINE

## 2020-10-10 PROCEDURE — 93306 TTE W/DOPPLER COMPLETE: CPT

## 2020-10-10 PROCEDURE — 6370000000 HC RX 637 (ALT 250 FOR IP): Performed by: INTERNAL MEDICINE

## 2020-10-10 PROCEDURE — 99238 HOSP IP/OBS DSCHRG MGMT 30/<: CPT | Performed by: INTERNAL MEDICINE

## 2020-10-10 RX ADMIN — RALOXIFENE HYDROCHLORIDE 60 MG: 60 TABLET, FILM COATED ORAL at 10:39

## 2020-10-10 RX ADMIN — SERTRALINE HYDROCHLORIDE 50 MG: 50 TABLET ORAL at 10:40

## 2020-10-10 RX ADMIN — ACETAMINOPHEN 650 MG: 325 TABLET ORAL at 10:40

## 2020-10-10 RX ADMIN — Medication 10 ML: at 10:40

## 2020-10-10 RX ADMIN — HEPARIN SODIUM 5000 UNITS: 5000 INJECTION INTRAVENOUS; SUBCUTANEOUS at 06:20

## 2020-10-10 RX ADMIN — LEVOTHYROXINE SODIUM 75 MCG: 25 TABLET ORAL at 06:20

## 2020-10-10 RX ADMIN — ARIPIPRAZOLE 10 MG: 10 TABLET ORAL at 10:39

## 2020-10-10 RX ADMIN — ASPIRIN 81 MG: 81 TABLET, COATED ORAL at 10:40

## 2020-10-10 RX ADMIN — LEVETIRACETAM 500 MG: 500 TABLET ORAL at 10:39

## 2020-10-10 RX ADMIN — PRAVASTATIN SODIUM 40 MG: 40 TABLET ORAL at 10:40

## 2020-10-10 RX ADMIN — MIDODRINE HYDROCHLORIDE 5 MG: 5 TABLET ORAL at 10:40

## 2020-10-10 ASSESSMENT — PAIN SCALES - GENERAL: PAINLEVEL_OUTOF10: 4

## 2020-10-10 NOTE — DISCHARGE INSTR - COC
Continuity of Care Form    Patient Name: Yoshi Lopez   :  1950  MRN:  [id number]    Admit date:  10/8/2020  Discharge date:  ***    Code Status Order: Full Code   Advance Directives:   Advance Care Flowsheet Documentation       Date/Time Healthcare Directive Type of Healthcare Directive Copy in [address] Agent's Name Healthcare Agent's Phone Number    10/09/20 6876  --  --  No, copy requested from other (See comment)  --  --  --    10/09/20 0213  Yes, patient has an advance directive for healthcare treatment  Durable power of  for health care  --  Healthcare power of   Zunilda Or  [phone]            Admitting Physician:  Na Elias MD  PCP: Irina Ferrer MD    Discharging Nurse: [facility] Unit/Room#: /6125-07  Discharging Unit Phone Number: ***    Emergency Contact:   Extended Emergency Contact Information  Primary Emergency Contact: caleb menchaca  Home Phone: [phone]  Mobile Phone: [phone]  Relation: Brother/Sister    Past Surgical History:  Past Surgical History:   Procedure Laterality Date    COLONOSCOPY N/A 2020    COLONOSCOPY WITH BIOPSY performed by Andrei Up MD at [address] [facility] Left 2020    LEFT UPPER EXTREMITY DIALYSIS GRAFT performed by Aleaxnder Rodriguez MD at [facility] IR NONTUNNELED VASCULAR CATHETER  2020    IR NONTUNNELED VASCULAR CATHETER 2020 [address] [place] [address]    IR TUNNELED CATHETER PLACEMENT GREATER THAN 5 YEARS  2020    IR TUNNELED CATHETER PLACEMENT GREATER THAN 5 YEARS 2020 [facility] PROCEDURES    PACEMAKER PLACEMENT      AR THROMBECTOMY,AV FISTULA DIALYS GRFT Left 2020    THROMBECTOMY, ANGIOGRAM AND REVISION USING DISTAL INFLOW OF DIALYSIS GRAFT performed by Alexander Rodriguez MD at [address] [facility] 2020    EGD BIOPSY performed by Andrei Up MD at [address] [place] [address]       Immunization History: There is no immunization history on file for this patient.     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    Syncope and collapse R55       Isolation/Infection:   Isolation            No Isolation          Patient Infection Status       Infection Onset Added Last Indicated Last Indicated By Review Planned Expiration Resolved Resolved By    None active    Resolved    COVID-19 Rule Out 10/09/20 10/09/20 10/09/20 COVID-19 (Ordered)   10/09/20 Rule-Out Test Resulted    COVID-19 Rule Out 09/21/20 09/21/20 09/21/20 COVID-19 (Ordered)   09/21/20 Rule-Out Test Resulted    C-diff Rule Out 07/18/20 07/18/20 07/19/20 Clostridium Difficile Toxin/Antigen (Ordered)   07/19/20 Rule-Out Test Resulted    COVID-19 Rule Out 07/17/20 07/17/20 07/17/20 COVID-19 (Ordered)   07/20/20 Rule-Out Test Resulted            Nurse Assessment:  Last Vital Signs: BP (!) 88/59   Pulse 95   Temp 99.5 °F (37.5 °C) (Oral)   Resp 16   Ht 4' 8\" (1.422 m)   Wt 78 lb (35.4 kg)   SpO2 97%   BMI 17.49 kg/m²     Last documented pain score (0-10 scale): Pain Level: 0  Last Weight:   Wt Readings from Last 1 Encounters:   10/10/20 78 lb (35.4 kg)     Mental Status:  {IP PT MENTAL STATUS:20030:::0}    IV Access:  { EDIE IV ACCESS:798727409:::0}    Nursing Mobility/ADLs:  Walking   {P DME ADLs:918580267:::0}  Transfer  {CHP DME ADLs:981902061:::0}  Bathing  {CHP DME ADLs:842029961:::0}  Dressing  {CHP DME ADLs:675503747:::0}  Toileting  {CHP DME ADLs:777707775:::0}  Feeding  {CHP DME ADLs:524849820:::0}  Med Admin  {CHP DME ADLs:742197504:::0}  Med Delivery   { EDIE MED Delivery:307531388:::0}    Wound Care Documentation and Therapy:        Elimination:  Continence:   · Bowel: {YES / TA:30497}  · Bladder: {YES / TK:31903}  Urinary Catheter: {Urinary Catheter:308956397:::0}   Colostomy/Ileostomy/Ileal Conduit: {YES / FW:14353}       Date of Last BM: ***    Intake/Output Summary (Last 24 hours) at 10/10/2020 0941  Last data filed at 10/9/2020 1549  Gross per 24 hour   Intake 1200 ml   Output 900 ml   Net 300 ml     I/O last 3 completed shifts:   In: 1200   Out: 900     Safety Concerns:     [address] Safety Concerns:270876660:::0}    Impairments/Disabilities:      [address] Impairments/Disabilities:774255113:::0}    Nutrition Therapy:  Current Nutrition Therapy:   [address] Diet List:966337285:::0}    Routes of Feeding: {CHP DME Other Feedings:052615200:::0}  Liquids: {Slp liquid thickness:90962}  Daily Fluid Restriction: {CHP DME Yes amt example:314420426:::0}  Last Modified Barium Swallow with Video (Video Swallowing Test): {Done Not Done ZO:238249528:::2}    Treatments at the Time of Hospital Discharge:   Respiratory Treatments: ***  Oxygen Therapy:  {Therapy; copd oxygen:13638:::0}  Ventilator:    { CC Vent List:887565697:::0}    Rehab Therapies: {THERAPEUTIC INTERVENTION:3361304984}  Weight Bearing Status/Restrictions: [address] Weight Bearin:::0}  Other Medical Equipment (for information only, NOT a DME order):  {EQUIPMENT:961919103}  Other Treatments: ***    Patient's personal belongings (please select all that are sent with patient):  {CHP DME Belongings:607682941:::0}    RN SIGNATURE:  {Esignature:926451936:::0}    CASE MANAGEMENT/SOCIAL WORK SECTION    Inpatient Status Date: 10/9/20    Readmission Risk Assessment Score:  Readmission Risk              Risk of Unplanned Readmission:        29           Discharging to Facility/ Agency   · Name: [facility]  · Phone: [phone]  · [facility]:[phone]    Dialysis Facility (if applicable)   · Name: [facility]  · Dialysis Schedule: M-W-F  · Phone: [phone]  · [facility]:[phone]    / signature: Electronically signed by Kayleigh Tobin RN on 10/10/20 at 10:34 AM EDT    PHYSICIAN SECTION    Prognosis: Good    Condition at Discharge: Stable    Rehab Potential (if transferring to Rehab): Good    Recommended Labs or Other Treatments After Discharge: avoid BP meds      Physician Certification: I certify the above information and transfer of Toshia Lay  is necessary for the continuing treatment of the diagnosis listed and that she requires [place] for less 30 days.      Update Admission H&P: No change in H&P    PHYSICIAN SIGNATURE:  Electronically signed by Hallie Hay MD on 10/10/20 at 9:42 AM EDT

## 2020-10-10 NOTE — CARE COORDINATION
DISCHARGE ORDER  Date/Time 10/10/2020 10:41 AM  Completed by: Jene Cooks, Case Management    Patient Name: Seble Segovia    : 1950      Admit order Date and Status: 10/9/20 inpt  Noted discharge order. (verify MD's last order for status of admission/Traditional Medicare 3 [place] Inpatient qualifying stay required for SNF)    Confirmed discharge plan with:              Patient:  Yes              When pt confirms DC plan does any support person need to be contacted by CM Yes if yes who sister [facility]                     Discharge to Facility: [email or web address] phone number for staff giving report: [phone]   Pre-certification completed: [address] Exemption Notification (HENS) completed: N/A LTC   Discharge orders and Continuity of Care faxed to facility: YES     Transportation:               Medical Transport explained with choice list offered to pt/family. Choice:(no preference)  Agency used: [facility] up time:   14:30      Pt/family/Nursing/Facility aware of  time:   Yes Names: Ivey Gosselin charge, MIGUEL Sánchez, pt , [facility] pt sister and Patt Medina at [facility]  Ambulance form completed:  Yes:      Comments: Order for dc noted. Spoke with pt who cont plan to return to [facility] for LTC. Spoke with Patt Medina at [facility] who states can accept today to LTC. Chart reviewed and no other dc needs identified,      Pt is being d/c'd to SNF today. Pt's O2 sats are 97% on RA. Discharge timeout done with nsg,CM and pt and Maci via phone. All discharge needs and concerns addressed. Discharging nurse to complete EDIE, reconcile AVS, and place final copy with patient's discharge packet. Discharging RN to ensure that written prescriptions for  Level II medications are sent with patient to the facility as per protocol.

## 2020-10-10 NOTE — PLAN OF CARE
Problem: Falls - Risk of:  Goal: Will remain free from falls  Description: Will remain free from falls  10/9/2020 1103 by Bina Billy RN  Outcome: Ongoing  Goal: Absence of physical injury  Description: Absence of physical injury  10/9/2020 1103 by Bina Billy RN  Outcome: Ongoing     Problem: Infection:  Goal: Will remain free from infection  Description: Will remain free from infection  10/9/2020 1103 by Bina Billy RN  Outcome: Ongoing     Problem: Safety:  Goal: Free from accidental physical injury  Description: Free from accidental physical injury  10/9/2020 1103 by Bina Billy RN  Outcome: Ongoing  Goal: Free from intentional harm  Description: Free from intentional harm  10/9/2020 1103 by Bina Billy RN  Outcome: Ongoing     Problem: Daily Care:  Goal: Daily care needs are met  Description: Daily care needs are met  10/9/2020 1103 by Bina Billy RN  Outcome: Ongoing     Problem: Pain:  Goal: Patient's pain/discomfort is manageable  Description: Patient's pain/discomfort is manageable  10/9/2020 1103 by Bina Billy RN  Outcome: Ongoing     Problem: Skin Integrity:  Goal: Skin integrity will stabilize  Description: Skin integrity will stabilize  10/9/2020 1103 by Bina Billy RN  Outcome: Ongoing     Problem: Discharge Planning:  Goal: Patients continuum of care needs are met  Description: Patients continuum of care needs are met  10/9/2020 1103 by Bina Billy RN  Outcome: Ongoing     Problem: Skin Integrity:  Goal: Will show no infection signs and symptoms  Description: Will show no infection signs and symptoms  10/9/2020 1103 by Bina Billy RN  Outcome: Ongoing  Goal: Absence of new skin breakdown  Description: Absence of new skin breakdown  10/9/2020 1103 by Bina Billy RN  Outcome: Ongoing

## 2020-10-10 NOTE — PROGRESS NOTES
Assessment complete as per flow sheets. VSS. Patient is A/O x 4. Unlabored breathing at rest on room air. Normal sinus rhythm/sinus tach on cardiac monitor. Bowel sounds + x4 quads. Patient denies pain. Comfort measures provided. Patient is M/W/F HD patient, reports she makes small amounts of urine every few days. PIV appears WNL. Dressing is C/D/I. Discussed POC, labs, testing, medical equipment and unit routine. Answered questions at this time. Meds as per MAR. Safety measures in place and will continue to monitor.
Dc instructions reviewed with patient. IV removed with no complications. Patient dressed and stated she had all of her belongings. Report given at bedside to EMS. Patient is being loaded to stretcher at this time.
Nephrology consult called to Dr. Zeeshan Mcguire on call. Spoke with José Walsh @[phone].     Tayler Gar PCA/MT  10/09/2020
Patient admitted to room 312-01 from ER. Patient oriented to room, call light, bed rails, phone, lights and bathroom. Patient instructed about the schedule of the day including: vital sign frequency, lab draws, possible tests, frequency of MD and staff rounds, daily weights, I &O's and prescribed diet. Bed alarm in place, patient aware of placement and reason. Telemetry box in place, patient aware of placement and reason. Bed locked, in lowest position, side rails up 2/4, call light within reach. Recliner Assessment  Patient is not able to demonstrated the ability to move from a reclining position to an upright position within the recliner.  however patient is alert, oriented and able to provide informed consent
Pt had 5 beats of Vtach. Will continue to monitor.
Pt resting with eyes closed. Call light within reach. Will continue to monitor.
Rep here to interrogate pacer, sent to dialysis unit.
Spoke with Josy Kidd from [employer] re patients need to have pacemaker interrogated she said the Rep for this area is Dewayne Bernard she has put a page out to them awaiting a return call.  Harvey Mcduffie [phone] 10-9-20
Updated sister, Clementina Caal, with plan of care. Informed sister that pacemaker was identified as [employer]. (awaiting rep call back from [employer] for interrogation). Dialysis scheduled for pt at 1200.
While transferring from stretcher to bed, patient says she becomes \"light-headed\". Education provided for slow position changes, and assistance for transferring. BP while sitting 101/68. Will closely monitor.
m/r/g  Respiratory: CTA B without w/r/r; respiratory effort normal  Abdomen:  +bs, soft, nt, nd, no hepatosplenomegaly  Ext: no lower extremity edema  Neuro/Psy: AAoriented times 3 ; moves all 4 ext  Musculoskeletal:  Rom, muscular strength intact; digits, nails normal    Data/  Recent Labs     10/08/20  2133   WBC 9.1   HGB 10.6*   HCT 34.9*   .7*   *     Recent Labs     10/08/20  2133   *   K 4.0   CL 99   CO2 23   GLUCOSE 123*   BUN 27*   CREATININE 3.7*   LABGLOM 12*   GFRAA 15*     Summary    Left ventricular systolic function is normal with ejection fraction    estimated at 55-65 %.    No regional wall motion abnormalities are noted.    Left ventricular size is decreased.    Grade I diastolic dysfunction with normal filling pressure.    Mild tricuspid regurgitation.    Normal systolic pulmonary artery pressure (SPAP) estimated at 25 mmHg (RA    pressure 3 mmHg). Assessment  -ESRD on hemodialysis Monday Wednesday Friday at [facility]  -Anemia  -CKD/MBD  -Thrombocytopenia  -Hyponatremia  -Syncope from orthostatic hypotension  -CHF with preserved EF    Plan  -HD Monday Wednesday Friday    increase her target weight of 35 kg from 34  -Continue Midodrine-schedule it will hold for SBP more than 120  -Renal dose medications  -EPO 7000 units with hemodialysis    Thank you for the consultation. Please do not hesitate to call with questions.     Metro Galas  [facility]  Office: [phone]  Fax:    [phone]

## 2020-10-12 ENCOUNTER — TELEPHONE (OUTPATIENT)
Dept: EMERGENCY DEPT | Age: 70
End: 2020-10-12

## 2020-10-12 LAB
CULTURE, BLOOD 2: NORMAL
REPORT: NORMAL

## 2020-10-12 NOTE — TELEPHONE ENCOUNTER
Called PATIENTS' [facility]: + blood cx result   Pt just got back from dialysis. She is feeling very weak. I spoke with Director of Nursing who took report and will have the patient be re-evaluated with repeat blood cx.

## 2020-10-14 LAB
BLOOD CULTURE, ROUTINE: ABNORMAL
BLOOD CULTURE, ROUTINE: ABNORMAL
ORGANISM: ABNORMAL
ORGANISM: ABNORMAL

## 2020-10-14 NOTE — DISCHARGE SUMMARY
Name:  Chloe Blair  Room:  /9799-42  MRN:    [id number]    Discharge Summary      This discharge summary is in conjunction with a complete physical exam done on the day of discharge. Discharging Physician: Dr. Shelby Olp: 10/8/2020  Discharge:  10/10/2020    HPI taken from admission H&P:      72 y.o. female who presented to to the hospital from her nursing home after falling and hitting the back of her head. The patient is a poor historian and has Alzheimer's dementia. She apparently blacked out and hit her head. Denies any preceding symptoms. She gets hemodialysis via a tunneled dialysis catheter. Her dialysis days are Monday Wednesday and Friday. On presentation to the emergency department she was not markedly hypotensive and was bolused 500 cc of IV fluids. When I saw her she was awake and alert denied any symptoms. She will be admitted for observation. Diagnoses this Admission and Hospital Course     Syncope, likely secondary to hypotension   - pt stood up for radio and passed out at [facility]. No trauma noted, pt does not remember events. Ct head neg. EKG neg   - Hypotension noted on admission which improved with IVF  - Avoid hypotension   - resumed midodrine  - Has pacer for sss, but no echo in epic  - checked echo: normal EF, grade I DD, mild TR     Sick sinus syndrome - pacer in place      ESRD on hemodialysis  - electrolytes stable. Consulted nephrology   - Elevated trop likely 2/2 hypotension renal disease, no chest pain      Chronic hypotension - on Midodrine     Underweight - supportive measures     Seizure disorder - on keppra    Procedures (Please Review Full Report for Details)  None    Consults    Nephrology    Physical Exam at Discharge:    /62   Pulse 89   Temp 98.9 °F (37.2 °C) (Oral)   Resp 17   Ht 4' 8\" (1.422 m)   Wt 78 lb (35.4 kg)   SpO2 98%   BMI 17.49 kg/m²     General: elderly thin female, underweight    Awake, alert and oriented.  Appears to be not in aspirin 81 MG EC tablet     bismuth subsalicylate 598 UT/99QO suspension  Commonly known as:  PEPTO BISMOL     docusate sodium 100 MG capsule  Commonly known as:  COLACE     levETIRAcetam 500 MG tablet  Commonly known as:  KEPPRA     levothyroxine 75 MCG tablet  Commonly known as:  SYNTHROID     midodrine 5 MG tablet  Commonly known as:  PROAMATINE  Take 1 tablet by mouth as needed (HD: Give pre and mid tx for Sbp <100)     mirtazapine 15 MG tablet  Commonly known as:  REMERON     ondansetron 4 MG tablet  Commonly known as:  ZOFRAN     pantoprazole 40 MG tablet  Commonly known as:  PROTONIX     potassium chloride 10 MEQ extended release tablet  Commonly known as:  KLOR-CON     pravastatin 40 MG tablet  Commonly known as:  PRAVACHOL     raloxifene 60 MG tablet  Commonly known as:  EVISTA     senna 8.6 MG tablet  Commonly known as:  SENOKOT     sertraline 50 MG tablet  Commonly known as:  ZOLOFT     vitamin C 500 MG tablet  Commonly known as:  ASCORBIC ACID        STOP taking these medications    doxycycline hyclate 100 MG capsule  Commonly known as:  VIBRAMYCIN     doxycycline monohydrate 100 MG tablet  Commonly known as:  ADOXA     isosorbide mononitrate 30 MG extended release tablet  Commonly known as:  IMDUR     lactulose 10 GM/15ML solution  Commonly known as:  CHRONULAC     potassium chloride 10 MEQ extended release tablet  Commonly known as:  KLOR-CON M              Discharged in stable condition to SNF. Follow Up: Follow up with physician at SNF.

## 2020-10-15 ENCOUNTER — TELEPHONE (OUTPATIENT)
Dept: VASCULAR SURGERY | Age: 70
End: 2020-10-15

## 2020-10-15 NOTE — RESULT ENCOUNTER NOTE
Culture reviewed, patient had 1 culture positive likely a contaminant. Please contact patient- if patient continues to feel weak with fever and symptomatic, please return to the emergency department. However I did review a note stating that repeat cultures were performed at [facility][1].

## 2020-10-15 NOTE — TELEPHONE ENCOUNTER
Called Sigifredo weinberg to schedule followup apt as she missed her last one due to having to go into hospital. She is on antibiotics as put on these by [facility] dialysis unit.  Nurse was not available and asked to have her to call me regarding scheduling followup apt. cara

## 2020-10-16 ENCOUNTER — OFFICE VISIT (OUTPATIENT)
Dept: VASCULAR SURGERY | Age: 70
End: 2020-10-16

## 2020-10-16 VITALS
BODY MASS INDEX: 16.83 KG/M2 | WEIGHT: 78 LBS | DIASTOLIC BLOOD PRESSURE: 68 MMHG | SYSTOLIC BLOOD PRESSURE: 100 MMHG | HEIGHT: 57 IN

## 2020-10-16 PROCEDURE — 99024 POSTOP FOLLOW-UP VISIT: CPT | Performed by: SURGERY

## 2020-10-16 NOTE — PROGRESS NOTES
Outpatient Post-Op Visit  PATIENT NAME: Ananth Coto     TODAY'S DATE: 10/16/2020    SUBJECTIVE:    Pt is seen in f/u regarding dialysis access. S/P NOY. She was at [place] last week after fall. 1 of2 blood cultures was positive for staph. She denies any wound issues in left arm. No drainage. OBJECTIVE:   VITALS:  /68 (Site: Right Upper Arm)   Ht 4' 8.5\" (1.435 m)   Wt 78 lb (35.4 kg)   BMI 17.18 kg/m²                   INCISIONs: clean dry and intact. Good bruit in graft. Hand warm. No erythema or drainage. ASSESSMENT AND PLAN:  72 y.o. female status post dialysis graft revision. No signs of infection. Ok to use dialysis graft.      Linette Dowd MD, FACS

## 2021-01-01 ENCOUNTER — APPOINTMENT (OUTPATIENT)
Dept: GENERAL RADIOLOGY | Age: 71
DRG: 314 | End: 2021-01-01
Payer: MEDICARE

## 2021-01-01 ENCOUNTER — APPOINTMENT (OUTPATIENT)
Dept: CT IMAGING | Age: 71
DRG: 314 | End: 2021-01-01
Payer: MEDICARE

## 2021-01-01 ENCOUNTER — HOSPITAL ENCOUNTER (EMERGENCY)
Age: 71
Discharge: NURSING, REHAB OR LONG-TERM CARE | End: 2021-04-18
Attending: STUDENT IN AN ORGANIZED HEALTH CARE EDUCATION/TRAINING PROGRAM
Payer: MEDICARE

## 2021-01-01 ENCOUNTER — NURSE ONLY (OUTPATIENT)
Dept: CARDIOLOGY CLINIC | Age: 71
End: 2021-01-01
Payer: MEDICARE

## 2021-01-01 ENCOUNTER — APPOINTMENT (OUTPATIENT)
Dept: GENERAL RADIOLOGY | Age: 71
End: 2021-01-01
Payer: MEDICARE

## 2021-01-01 ENCOUNTER — HOSPITAL ENCOUNTER (EMERGENCY)
Age: 71
Discharge: NURSING, REHAB OR LONG-TERM CARE | End: 2021-09-30
Attending: STUDENT IN AN ORGANIZED HEALTH CARE EDUCATION/TRAINING PROGRAM
Payer: MEDICARE

## 2021-01-01 ENCOUNTER — TELEPHONE (OUTPATIENT)
Dept: CARDIOLOGY CLINIC | Age: 71
End: 2021-01-01

## 2021-01-01 ENCOUNTER — HOSPITAL ENCOUNTER (OUTPATIENT)
Age: 71
Setting detail: OBSERVATION
Discharge: NURSING, REHAB OR LONG-TERM CARE | End: 2021-08-18
Attending: EMERGENCY MEDICINE | Admitting: INTERNAL MEDICINE
Payer: MEDICARE

## 2021-01-01 ENCOUNTER — HOSPITAL ENCOUNTER (INPATIENT)
Age: 71
LOS: 3 days | DRG: 314 | End: 2021-11-18
Attending: EMERGENCY MEDICINE | Admitting: INTERNAL MEDICINE
Payer: MEDICARE

## 2021-01-01 ENCOUNTER — HOSPITAL ENCOUNTER (EMERGENCY)
Age: 71
Discharge: NURSING, REHAB OR LONG-TERM CARE | End: 2021-10-27
Attending: EMERGENCY MEDICINE
Payer: MEDICARE

## 2021-01-01 ENCOUNTER — HOSPITAL ENCOUNTER (OUTPATIENT)
Dept: CARDIOLOGY | Age: 71
Discharge: HOME | End: 2021-11-09
Payer: COMMERCIAL

## 2021-01-01 ENCOUNTER — OFFICE VISIT (OUTPATIENT)
Dept: CARDIOLOGY CLINIC | Age: 71
End: 2021-01-01
Payer: MEDICARE

## 2021-01-01 VITALS
TEMPERATURE: 98.2 F | HEIGHT: 56 IN | SYSTOLIC BLOOD PRESSURE: 92 MMHG | WEIGHT: 79.81 LBS | OXYGEN SATURATION: 100 % | HEART RATE: 107 BPM | DIASTOLIC BLOOD PRESSURE: 63 MMHG | RESPIRATION RATE: 15 BRPM | BODY MASS INDEX: 17.95 KG/M2

## 2021-01-01 VITALS
DIASTOLIC BLOOD PRESSURE: 58 MMHG | HEIGHT: 56 IN | OXYGEN SATURATION: 100 % | WEIGHT: 73 LBS | HEART RATE: 92 BPM | SYSTOLIC BLOOD PRESSURE: 78 MMHG | BODY MASS INDEX: 16.42 KG/M2

## 2021-01-01 VITALS
OXYGEN SATURATION: 85 % | DIASTOLIC BLOOD PRESSURE: 68 MMHG | HEIGHT: 56 IN | TEMPERATURE: 97.4 F | HEART RATE: 114 BPM | WEIGHT: 80.03 LBS | RESPIRATION RATE: 28 BRPM | BODY MASS INDEX: 18 KG/M2 | SYSTOLIC BLOOD PRESSURE: 99 MMHG

## 2021-01-01 VITALS
WEIGHT: 72.5 LBS | DIASTOLIC BLOOD PRESSURE: 43 MMHG | SYSTOLIC BLOOD PRESSURE: 68 MMHG | HEIGHT: 56 IN | OXYGEN SATURATION: 98 % | BODY MASS INDEX: 16.31 KG/M2 | HEART RATE: 88 BPM

## 2021-01-01 VITALS
HEART RATE: 74 BPM | TEMPERATURE: 98.4 F | SYSTOLIC BLOOD PRESSURE: 126 MMHG | OXYGEN SATURATION: 100 % | DIASTOLIC BLOOD PRESSURE: 66 MMHG | RESPIRATION RATE: 18 BRPM

## 2021-01-01 VITALS
DIASTOLIC BLOOD PRESSURE: 61 MMHG | OXYGEN SATURATION: 99 % | HEART RATE: 67 BPM | RESPIRATION RATE: 17 BRPM | SYSTOLIC BLOOD PRESSURE: 82 MMHG | TEMPERATURE: 97.5 F | BODY MASS INDEX: 16.42 KG/M2 | WEIGHT: 73 LBS | HEIGHT: 56 IN

## 2021-01-01 VITALS
TEMPERATURE: 97.9 F | BODY MASS INDEX: 17.55 KG/M2 | WEIGHT: 78 LBS | HEART RATE: 79 BPM | SYSTOLIC BLOOD PRESSURE: 134 MMHG | RESPIRATION RATE: 14 BRPM | DIASTOLIC BLOOD PRESSURE: 71 MMHG | OXYGEN SATURATION: 100 % | HEIGHT: 56 IN

## 2021-01-01 DIAGNOSIS — T82.42XA DISPLACEMENT OF VASCULAR DIALYSIS CATHETER, INITIAL ENCOUNTER (HCC): Primary | ICD-10-CM

## 2021-01-01 DIAGNOSIS — R07.9 CHEST PAIN, UNSPECIFIED TYPE: ICD-10-CM

## 2021-01-01 DIAGNOSIS — E86.1 HYPOTENSION DUE TO HYPOVOLEMIA: ICD-10-CM

## 2021-01-01 DIAGNOSIS — E86.0 DEHYDRATION: Primary | ICD-10-CM

## 2021-01-01 DIAGNOSIS — R07.89 CHEST WALL PAIN: Primary | ICD-10-CM

## 2021-01-01 DIAGNOSIS — R55 SYNCOPE AND COLLAPSE: ICD-10-CM

## 2021-01-01 DIAGNOSIS — I95.89 HYPOTENSION DUE TO HYPOVOLEMIA: ICD-10-CM

## 2021-01-01 DIAGNOSIS — I95.9 HYPOTENSION, UNSPECIFIED HYPOTENSION TYPE: Primary | ICD-10-CM

## 2021-01-01 DIAGNOSIS — K52.9 COLITIS: ICD-10-CM

## 2021-01-01 DIAGNOSIS — R07.9 CHEST PAIN, UNSPECIFIED TYPE: Primary | ICD-10-CM

## 2021-01-01 DIAGNOSIS — Z95.0 PACEMAKER: ICD-10-CM

## 2021-01-01 DIAGNOSIS — N18.6 ESRD (END STAGE RENAL DISEASE) (HCC): ICD-10-CM

## 2021-01-01 DIAGNOSIS — R56.9 SEIZURES (HCC): ICD-10-CM

## 2021-01-01 DIAGNOSIS — Z95.0 PACEMAKER: Primary | ICD-10-CM

## 2021-01-01 DIAGNOSIS — R77.8 ELEVATED TROPONIN: ICD-10-CM

## 2021-01-01 DIAGNOSIS — R55 SYNCOPE AND COLLAPSE: Primary | ICD-10-CM

## 2021-01-01 DIAGNOSIS — N18.6 END STAGE RENAL DISEASE (HCC): ICD-10-CM

## 2021-01-01 LAB
A/G RATIO: 0.8 (ref 1.1–2.2)
A/G RATIO: 0.9 (ref 1.1–2.2)
A/G RATIO: 1.1 (ref 1.1–2.2)
A/G RATIO: 1.3 (ref 1.1–2.2)
ALBUMIN SERPL-MCNC: 2.2 G/DL (ref 3.4–5)
ALBUMIN SERPL-MCNC: 2.5 G/DL (ref 3.4–5)
ALBUMIN SERPL-MCNC: 2.6 G/DL (ref 3.4–5)
ALBUMIN SERPL-MCNC: 2.8 G/DL (ref 3.4–5)
ALP BLD-CCNC: 298 U/L (ref 40–129)
ALP BLD-CCNC: 367 U/L (ref 40–129)
ALP BLD-CCNC: 389 U/L (ref 40–129)
ALP BLD-CCNC: 424 U/L (ref 40–129)
ALT SERPL-CCNC: 14 U/L (ref 10–40)
ALT SERPL-CCNC: 16 U/L (ref 10–40)
ALT SERPL-CCNC: 17 U/L (ref 10–40)
ALT SERPL-CCNC: 26 U/L (ref 10–40)
AMMONIA: 24 UMOL/L (ref 11–51)
ANION GAP SERPL CALCULATED.3IONS-SCNC: 11 MMOL/L (ref 3–16)
ANION GAP SERPL CALCULATED.3IONS-SCNC: 11 MMOL/L (ref 3–16)
ANION GAP SERPL CALCULATED.3IONS-SCNC: 13 MMOL/L (ref 3–16)
ANION GAP SERPL CALCULATED.3IONS-SCNC: 13 MMOL/L (ref 3–16)
ANION GAP SERPL CALCULATED.3IONS-SCNC: 14 MMOL/L (ref 3–16)
ANION GAP SERPL CALCULATED.3IONS-SCNC: 17 MMOL/L (ref 3–16)
ANISOCYTOSIS: ABNORMAL
AST SERPL-CCNC: 23 U/L (ref 15–37)
AST SERPL-CCNC: 25 U/L (ref 15–37)
AST SERPL-CCNC: 29 U/L (ref 15–37)
AST SERPL-CCNC: 85 U/L (ref 15–37)
ATYPICAL LYMPHOCYTE RELATIVE PERCENT: 3 % (ref 0–6)
BASE EXCESS ARTERIAL: -12 (ref -3–3)
BASE EXCESS VENOUS: -12.3 MMOL/L (ref -3–3)
BASOPHILS ABSOLUTE: 0 K/UL (ref 0–0.2)
BASOPHILS ABSOLUTE: 0 K/UL (ref 0–0.2)
BASOPHILS ABSOLUTE: 0.1 K/UL (ref 0–0.2)
BASOPHILS RELATIVE PERCENT: 0 %
BASOPHILS RELATIVE PERCENT: 0.5 %
BASOPHILS RELATIVE PERCENT: 1.2 %
BASOPHILS RELATIVE PERCENT: 1.2 %
BASOPHILS RELATIVE PERCENT: 1.3 %
BASOPHILS RELATIVE PERCENT: 1.5 %
BILIRUB SERPL-MCNC: 0.4 MG/DL (ref 0–1)
BILIRUB SERPL-MCNC: 0.5 MG/DL (ref 0–1)
BILIRUB SERPL-MCNC: 0.8 MG/DL (ref 0–1)
BILIRUB SERPL-MCNC: 0.8 MG/DL (ref 0–1)
BUN BLDV-MCNC: 16 MG/DL (ref 7–20)
BUN BLDV-MCNC: 28 MG/DL (ref 7–20)
BUN BLDV-MCNC: 36 MG/DL (ref 7–20)
BUN BLDV-MCNC: 40 MG/DL (ref 7–20)
BUN BLDV-MCNC: 55 MG/DL (ref 7–20)
BUN BLDV-MCNC: 6 MG/DL (ref 7–20)
C-REACTIVE PROTEIN: 25.8 MG/L (ref 0–5.1)
C-REACTIVE PROTEIN: 26.8 MG/L (ref 0–5.1)
C-REACTIVE PROTEIN: 29.2 MG/L (ref 0–5.1)
CALCIUM SERPL-MCNC: 7.5 MG/DL (ref 8.3–10.6)
CALCIUM SERPL-MCNC: 7.7 MG/DL (ref 8.3–10.6)
CALCIUM SERPL-MCNC: 8.4 MG/DL (ref 8.3–10.6)
CALCIUM SERPL-MCNC: 8.4 MG/DL (ref 8.3–10.6)
CALCIUM SERPL-MCNC: 8.5 MG/DL (ref 8.3–10.6)
CALCIUM SERPL-MCNC: 9.5 MG/DL (ref 8.3–10.6)
CARBOXYHEMOGLOBIN: 1.9 % (ref 0–1.5)
CHLORIDE BLD-SCNC: 100 MMOL/L (ref 99–110)
CHLORIDE BLD-SCNC: 102 MMOL/L (ref 99–110)
CHLORIDE BLD-SCNC: 105 MMOL/L (ref 99–110)
CHLORIDE BLD-SCNC: 105 MMOL/L (ref 99–110)
CHLORIDE BLD-SCNC: 107 MMOL/L (ref 99–110)
CHLORIDE BLD-SCNC: 113 MMOL/L (ref 99–110)
CHOLESTEROL, TOTAL: 103 MG/DL (ref 0–199)
CHOLESTEROL, TOTAL: 54 MG/DL (ref 0–199)
CHP ED QC CHECK: 118
CO2: 14 MMOL/L (ref 21–32)
CO2: 19 MMOL/L (ref 21–32)
CO2: 23 MMOL/L (ref 21–32)
CO2: 24 MMOL/L (ref 21–32)
CO2: 24 MMOL/L (ref 21–32)
CO2: 25 MMOL/L (ref 21–32)
CORTISOL TOTAL: 16.1 UG/DL
CORTISOL TOTAL: 28.6 UG/DL
CREAT SERPL-MCNC: 2 MG/DL (ref 0.6–1.2)
CREAT SERPL-MCNC: 3.8 MG/DL (ref 0.6–1.2)
CREAT SERPL-MCNC: 4.2 MG/DL (ref 0.6–1.2)
CREAT SERPL-MCNC: 4.7 MG/DL (ref 0.6–1.2)
CREAT SERPL-MCNC: 4.7 MG/DL (ref 0.6–1.2)
CREAT SERPL-MCNC: 4.9 MG/DL (ref 0.6–1.2)
D DIMER: 596 NG/ML DDU (ref 0–229)
EKG ATRIAL RATE: 60 BPM
EKG ATRIAL RATE: 72 BPM
EKG ATRIAL RATE: 73 BPM
EKG ATRIAL RATE: 77 BPM
EKG ATRIAL RATE: 81 BPM
EKG ATRIAL RATE: 88 BPM
EKG DIAGNOSIS: NORMAL
EKG P AXIS: 29 DEGREES
EKG P AXIS: 40 DEGREES
EKG P AXIS: 6 DEGREES
EKG P-R INTERVAL: 184 MS
EKG P-R INTERVAL: 192 MS
EKG P-R INTERVAL: 196 MS
EKG P-R INTERVAL: 202 MS
EKG P-R INTERVAL: 220 MS
EKG Q-T INTERVAL: 380 MS
EKG Q-T INTERVAL: 386 MS
EKG Q-T INTERVAL: 390 MS
EKG Q-T INTERVAL: 394 MS
EKG Q-T INTERVAL: 396 MS
EKG Q-T INTERVAL: 420 MS
EKG QRS DURATION: 60 MS
EKG QRS DURATION: 62 MS
EKG QRS DURATION: 66 MS
EKG QRS DURATION: 68 MS
EKG QTC CALCULATION (BAZETT): 420 MS
EKG QTC CALCULATION (BAZETT): 422 MS
EKG QTC CALCULATION (BAZETT): 429 MS
EKG QTC CALCULATION (BAZETT): 441 MS
EKG QTC CALCULATION (BAZETT): 448 MS
EKG QTC CALCULATION (BAZETT): 476 MS
EKG R AXIS: -22 DEGREES
EKG R AXIS: -27 DEGREES
EKG R AXIS: -27 DEGREES
EKG R AXIS: -33 DEGREES
EKG R AXIS: 21 DEGREES
EKG R AXIS: 72 DEGREES
EKG T AXIS: 28 DEGREES
EKG T AXIS: 31 DEGREES
EKG T AXIS: 35 DEGREES
EKG T AXIS: 38 DEGREES
EKG T AXIS: 40 DEGREES
EKG T AXIS: 85 DEGREES
EKG VENTRICULAR RATE: 60 BPM
EKG VENTRICULAR RATE: 72 BPM
EKG VENTRICULAR RATE: 73 BPM
EKG VENTRICULAR RATE: 77 BPM
EKG VENTRICULAR RATE: 81 BPM
EKG VENTRICULAR RATE: 88 BPM
EOSINOPHILS ABSOLUTE: 0 K/UL (ref 0–0.6)
EOSINOPHILS ABSOLUTE: 0.1 K/UL (ref 0–0.6)
EOSINOPHILS RELATIVE PERCENT: 0 %
EOSINOPHILS RELATIVE PERCENT: 0.6 %
EOSINOPHILS RELATIVE PERCENT: 0.7 %
EOSINOPHILS RELATIVE PERCENT: 0.9 %
EOSINOPHILS RELATIVE PERCENT: 1 %
EOSINOPHILS RELATIVE PERCENT: 1.2 %
ESTIMATED AVERAGE GLUCOSE: 85.3 MG/DL
FERRITIN: 2458 NG/ML (ref 15–150)
FOLATE: 10 NG/ML (ref 4.78–24.2)
GFR AFRICAN AMERICAN: 11
GFR AFRICAN AMERICAN: 13
GFR AFRICAN AMERICAN: 14
GFR AFRICAN AMERICAN: 30
GFR NON-AFRICAN AMERICAN: 10
GFR NON-AFRICAN AMERICAN: 12
GFR NON-AFRICAN AMERICAN: 25
GFR NON-AFRICAN AMERICAN: 9
GLOBULIN: 2.1 G/DL
GLOBULIN: 2.4 G/DL
GLOBULIN: 2.9 G/DL
GLUCOSE BLD-MCNC: 110 MG/DL (ref 70–99)
GLUCOSE BLD-MCNC: 118 MG/DL (ref 70–99)
GLUCOSE BLD-MCNC: 120 MG/DL (ref 70–99)
GLUCOSE BLD-MCNC: 124 MG/DL (ref 70–99)
GLUCOSE BLD-MCNC: 34 MG/DL (ref 70–99)
GLUCOSE BLD-MCNC: 57 MG/DL (ref 70–99)
GLUCOSE BLD-MCNC: 70 MG/DL (ref 70–99)
GLUCOSE BLD-MCNC: 77 MG/DL (ref 70–99)
GLUCOSE BLD-MCNC: 80 MG/DL (ref 70–99)
GLUCOSE BLD-MCNC: 85 MG/DL (ref 70–99)
GLUCOSE BLD-MCNC: 89 MG/DL (ref 70–99)
GLUCOSE BLD-MCNC: 98 MG/DL (ref 70–99)
HBA1C MFR BLD: 4.6 %
HCO3 ARTERIAL: 15.3 MMOL/L (ref 21–29)
HCO3 VENOUS: 12.6 MMOL/L (ref 23–29)
HCT VFR BLD CALC: 32.4 % (ref 36–48)
HCT VFR BLD CALC: 32.6 % (ref 36–48)
HCT VFR BLD CALC: 32.9 % (ref 36–48)
HCT VFR BLD CALC: 34 % (ref 36–48)
HCT VFR BLD CALC: 34.3 % (ref 36–48)
HCT VFR BLD CALC: 34.7 % (ref 36–48)
HCT VFR BLD CALC: 35.2 % (ref 36–48)
HCT VFR BLD CALC: 37.2 % (ref 36–48)
HDLC SERPL-MCNC: 11 MG/DL (ref 40–60)
HDLC SERPL-MCNC: 34 MG/DL (ref 40–60)
HEMOGLOBIN: 10.6 G/DL (ref 12–16)
HEMOGLOBIN: 10.7 G/DL (ref 12–16)
HEMOGLOBIN: 11 G/DL (ref 12–16)
HEMOGLOBIN: 11.1 G/DL (ref 12–16)
HEMOGLOBIN: 11.1 G/DL (ref 12–16)
HEMOGLOBIN: 11.3 G/DL (ref 12–16)
HEMOGLOBIN: 11.6 G/DL (ref 12–16)
IMMATURE RETIC FRACT: 0.57 (ref 0.21–0.37)
INR BLD: 0.97 (ref 0.88–1.12)
IRON SATURATION: NORMAL % (ref 15–50)
IRON: 39 UG/DL (ref 37–145)
KEPPRA DOSE AMT: NORMAL
KEPPRA: 28.8 UG/ML (ref 6–46)
LACTATE DEHYDROGENASE: 354 U/L (ref 100–190)
LACTIC ACID, SEPSIS: 2.6 MMOL/L (ref 0.4–1.9)
LACTIC ACID: 1 MMOL/L (ref 0.4–2)
LACTIC ACID: <0.2 MMOL/L (ref 0.4–2)
LDL CHOLESTEROL CALCULATED: 17 MG/DL
LDL CHOLESTEROL CALCULATED: 47 MG/DL
LV EF: 55 %
LVEF MODALITY: NORMAL
LYMPHOCYTES ABSOLUTE: 1.3 K/UL (ref 1–5.1)
LYMPHOCYTES ABSOLUTE: 1.4 K/UL (ref 1–5.1)
LYMPHOCYTES ABSOLUTE: 1.5 K/UL (ref 1–5.1)
LYMPHOCYTES ABSOLUTE: 1.5 K/UL (ref 1–5.1)
LYMPHOCYTES ABSOLUTE: 1.7 K/UL (ref 1–5.1)
LYMPHOCYTES ABSOLUTE: 1.8 K/UL (ref 1–5.1)
LYMPHOCYTES RELATIVE PERCENT: 13 %
LYMPHOCYTES RELATIVE PERCENT: 14.1 %
LYMPHOCYTES RELATIVE PERCENT: 18.9 %
LYMPHOCYTES RELATIVE PERCENT: 20.8 %
LYMPHOCYTES RELATIVE PERCENT: 21.2 %
LYMPHOCYTES RELATIVE PERCENT: 22.7 %
MAGNESIUM: 1.5 MG/DL (ref 1.8–2.4)
MAGNESIUM: 2.6 MG/DL (ref 1.8–2.4)
MCH RBC QN AUTO: 31.2 PG (ref 26–34)
MCH RBC QN AUTO: 31.8 PG (ref 26–34)
MCH RBC QN AUTO: 32.7 PG (ref 26–34)
MCH RBC QN AUTO: 32.8 PG (ref 26–34)
MCH RBC QN AUTO: 32.9 PG (ref 26–34)
MCHC RBC AUTO-ENTMCNC: 31.3 G/DL (ref 31–36)
MCHC RBC AUTO-ENTMCNC: 31.6 G/DL (ref 31–36)
MCHC RBC AUTO-ENTMCNC: 32.2 G/DL (ref 31–36)
MCHC RBC AUTO-ENTMCNC: 32.3 G/DL (ref 31–36)
MCHC RBC AUTO-ENTMCNC: 32.4 G/DL (ref 31–36)
MCHC RBC AUTO-ENTMCNC: 32.5 G/DL (ref 31–36)
MCHC RBC AUTO-ENTMCNC: 32.7 G/DL (ref 31–36)
MCV RBC AUTO: 100.4 FL (ref 80–100)
MCV RBC AUTO: 100.6 FL (ref 80–100)
MCV RBC AUTO: 100.7 FL (ref 80–100)
MCV RBC AUTO: 101.3 FL (ref 80–100)
MCV RBC AUTO: 101.7 FL (ref 80–100)
MCV RBC AUTO: 96.4 FL (ref 80–100)
MCV RBC AUTO: 98.8 FL (ref 80–100)
METHEMOGLOBIN VENOUS: 0.6 %
MONOCYTES ABSOLUTE: 0.7 K/UL (ref 0–1.3)
MONOCYTES ABSOLUTE: 0.8 K/UL (ref 0–1.3)
MONOCYTES ABSOLUTE: 0.8 K/UL (ref 0–1.3)
MONOCYTES ABSOLUTE: 0.9 K/UL (ref 0–1.3)
MONOCYTES ABSOLUTE: 1 K/UL (ref 0–1.3)
MONOCYTES ABSOLUTE: 1.2 K/UL (ref 0–1.3)
MONOCYTES RELATIVE PERCENT: 10.2 %
MONOCYTES RELATIVE PERCENT: 12 %
MONOCYTES RELATIVE PERCENT: 16.3 %
MONOCYTES RELATIVE PERCENT: 9.4 %
MONOCYTES RELATIVE PERCENT: 9.8 %
MONOCYTES RELATIVE PERCENT: 9.9 %
MRSA SCREEN RT-PCR: NORMAL
MYELOCYTE PERCENT: 3 %
NEUTROPHILS ABSOLUTE: 4.3 K/UL (ref 1.7–7.7)
NEUTROPHILS ABSOLUTE: 4.9 K/UL (ref 1.7–7.7)
NEUTROPHILS ABSOLUTE: 5.6 K/UL (ref 1.7–7.7)
NEUTROPHILS ABSOLUTE: 5.7 K/UL (ref 1.7–7.7)
NEUTROPHILS ABSOLUTE: 5.8 K/UL (ref 1.7–7.7)
NEUTROPHILS ABSOLUTE: 7.4 K/UL (ref 1.7–7.7)
NEUTROPHILS RELATIVE PERCENT: 58.8 %
NEUTROPHILS RELATIVE PERCENT: 66.7 %
NEUTROPHILS RELATIVE PERCENT: 67.5 %
NEUTROPHILS RELATIVE PERCENT: 68 %
NEUTROPHILS RELATIVE PERCENT: 70.4 %
NEUTROPHILS RELATIVE PERCENT: 74.2 %
O2 SAT, ARTERIAL: 55 % (ref 93–100)
O2 SAT, VEN: 89 %
O2 THERAPY: ABNORMAL
PCO2 ARTERIAL: 32.7 MM HG (ref 35–45)
PCO2, VEN: 26.5 MMHG (ref 40–50)
PDW BLD-RTO: 16.4 % (ref 12.4–15.4)
PDW BLD-RTO: 16.9 % (ref 12.4–15.4)
PDW BLD-RTO: 17.7 % (ref 12.4–15.4)
PDW BLD-RTO: 17.8 % (ref 12.4–15.4)
PDW BLD-RTO: 17.8 % (ref 12.4–15.4)
PDW BLD-RTO: 18 % (ref 12.4–15.4)
PDW BLD-RTO: 18 % (ref 12.4–15.4)
PERFORMED ON: ABNORMAL
PERFORMED ON: NORMAL
PH ARTERIAL: 7.28 (ref 7.35–7.45)
PH VENOUS: 7.29 (ref 7.35–7.45)
PHOSPHORUS: 1.6 MG/DL (ref 2.5–4.9)
PLATELET # BLD: 135 K/UL (ref 135–450)
PLATELET # BLD: 147 K/UL (ref 135–450)
PLATELET # BLD: 163 K/UL (ref 135–450)
PLATELET # BLD: 169 K/UL (ref 135–450)
PLATELET # BLD: 181 K/UL (ref 135–450)
PLATELET # BLD: 73 K/UL (ref 135–450)
PLATELET # BLD: 78 K/UL (ref 135–450)
PLATELET SLIDE REVIEW: ABNORMAL
PLATELET SLIDE REVIEW: ADEQUATE
PMV BLD AUTO: 10.2 FL (ref 5–10.5)
PMV BLD AUTO: 8.1 FL (ref 5–10.5)
PMV BLD AUTO: 8.4 FL (ref 5–10.5)
PMV BLD AUTO: 8.4 FL (ref 5–10.5)
PMV BLD AUTO: 8.5 FL (ref 5–10.5)
PMV BLD AUTO: 8.8 FL (ref 5–10.5)
PMV BLD AUTO: 9.2 FL (ref 5–10.5)
PO2 ARTERIAL: 32.4 MM HG (ref 75–108)
PO2, VEN: 64.9 MMHG (ref 25–40)
POC SAMPLE TYPE: ABNORMAL
POIKILOCYTES: ABNORMAL
POLYCHROMASIA: ABNORMAL
POTASSIUM REFLEX MAGNESIUM: 3.3 MMOL/L (ref 3.5–5.1)
POTASSIUM REFLEX MAGNESIUM: 4 MMOL/L (ref 3.5–5.1)
POTASSIUM REFLEX MAGNESIUM: 4.3 MMOL/L (ref 3.5–5.1)
POTASSIUM REFLEX MAGNESIUM: 4.4 MMOL/L (ref 3.5–5.1)
POTASSIUM REFLEX MAGNESIUM: 4.5 MMOL/L (ref 3.5–5.1)
POTASSIUM REFLEX MAGNESIUM: 4.7 MMOL/L (ref 3.5–5.1)
PRO-BNP: ABNORMAL PG/ML (ref 0–124)
PRO-BNP: ABNORMAL PG/ML (ref 0–124)
PROCALCITONIN: 1.64 NG/ML (ref 0–0.15)
PROTHROMBIN TIME: 10.9 SEC (ref 9.9–12.7)
RAPID INFLUENZA  B AGN: NEGATIVE
RAPID INFLUENZA A AGN: NEGATIVE
RBC # BLD: 3.24 M/UL (ref 4–5.2)
RBC # BLD: 3.24 M/UL (ref 4–5.2)
RBC # BLD: 3.44 M/UL (ref 4–5.2)
RBC # BLD: 3.45 M/UL (ref 4–5.2)
RBC # BLD: 3.5 M/UL (ref 4–5.2)
RBC # BLD: 3.56 M/UL (ref 4–5.2)
RBC # BLD: 3.66 M/UL (ref 4–5.2)
RETICULOCYTE ABSOLUTE COUNT: 0.06 M/UL (ref 0.02–0.1)
RETICULOCYTE COUNT PCT: 1.82 % (ref 0.5–2.18)
SARS-COV-2, NAAT: DETECTED
SARS-COV-2, NAAT: NOT DETECTED
SLIDE REVIEW: ABNORMAL
SLIDE REVIEW: ABNORMAL
SMUDGE CELLS: PRESENT
SODIUM BLD-SCNC: 134 MMOL/L (ref 136–145)
SODIUM BLD-SCNC: 135 MMOL/L (ref 136–145)
SODIUM BLD-SCNC: 142 MMOL/L (ref 136–145)
SODIUM BLD-SCNC: 142 MMOL/L (ref 136–145)
SODIUM BLD-SCNC: 143 MMOL/L (ref 136–145)
SODIUM BLD-SCNC: 144 MMOL/L (ref 136–145)
T4 FREE: 1.1 NG/DL (ref 0.9–1.8)
TCO2 ARTERIAL: 16 MMOL/L
TCO2 CALC VENOUS: 13 MMOL/L
TOTAL IRON BINDING CAPACITY: NORMAL UG/DL (ref 260–445)
TOTAL PROTEIN: 4.9 G/DL (ref 6.4–8.2)
TOTAL PROTEIN: 4.9 G/DL (ref 6.4–8.2)
TOTAL PROTEIN: 5 G/DL (ref 6.4–8.2)
TOTAL PROTEIN: 5.4 G/DL (ref 6.4–8.2)
TOXIC GRANULATION: PRESENT
TRIGL SERPL-MCNC: 111 MG/DL (ref 0–150)
TRIGL SERPL-MCNC: 131 MG/DL (ref 0–150)
TROPONIN: 0.03 NG/ML
TROPONIN: 0.04 NG/ML
TROPONIN: 0.04 NG/ML
TROPONIN: 0.05 NG/ML
TROPONIN: 0.05 NG/ML
TROPONIN: 0.06 NG/ML
TROPONIN: 0.07 NG/ML
TSH REFLEX: 4.66 UIU/ML (ref 0.27–4.2)
VITAMIN B-12: >2000 PG/ML (ref 211–911)
VLDLC SERPL CALC-MCNC: 22 MG/DL
VLDLC SERPL CALC-MCNC: 26 MG/DL
WBC # BLD: 10 K/UL (ref 4–11)
WBC # BLD: 6.2 K/UL (ref 4–11)
WBC # BLD: 7.4 K/UL (ref 4–11)
WBC # BLD: 7.4 K/UL (ref 4–11)
WBC # BLD: 7.9 K/UL (ref 4–11)
WBC # BLD: 8.2 K/UL (ref 4–11)
WBC # BLD: 8.5 K/UL (ref 4–11)

## 2021-01-01 PROCEDURE — 83605 ASSAY OF LACTIC ACID: CPT

## 2021-01-01 PROCEDURE — 6360000002 HC RX W HCPCS: Performed by: INTERNAL MEDICINE

## 2021-01-01 PROCEDURE — 36415 COLL VENOUS BLD VENIPUNCTURE: CPT

## 2021-01-01 PROCEDURE — 85045 AUTOMATED RETICULOCYTE COUNT: CPT

## 2021-01-01 PROCEDURE — 93005 ELECTROCARDIOGRAM TRACING: CPT | Performed by: EMERGENCY MEDICINE

## 2021-01-01 PROCEDURE — 1090F PRES/ABSN URINE INCON ASSESS: CPT | Performed by: INTERNAL MEDICINE

## 2021-01-01 PROCEDURE — 94761 N-INVAS EAR/PLS OXIMETRY MLT: CPT

## 2021-01-01 PROCEDURE — 87040 BLOOD CULTURE FOR BACTERIA: CPT

## 2021-01-01 PROCEDURE — 6360000002 HC RX W HCPCS: Performed by: EMERGENCY MEDICINE

## 2021-01-01 PROCEDURE — 82947 ASSAY GLUCOSE BLOOD QUANT: CPT

## 2021-01-01 PROCEDURE — 6370000000 HC RX 637 (ALT 250 FOR IP): Performed by: INTERNAL MEDICINE

## 2021-01-01 PROCEDURE — 87804 INFLUENZA ASSAY W/OPTIC: CPT

## 2021-01-01 PROCEDURE — 83540 ASSAY OF IRON: CPT

## 2021-01-01 PROCEDURE — 71046 X-RAY EXAM CHEST 2 VIEWS: CPT

## 2021-01-01 PROCEDURE — 3017F COLORECTAL CA SCREEN DOC REV: CPT | Performed by: INTERNAL MEDICINE

## 2021-01-01 PROCEDURE — G0378 HOSPITAL OBSERVATION PER HR: HCPCS

## 2021-01-01 PROCEDURE — 99214 OFFICE O/P EST MOD 30 MIN: CPT | Performed by: INTERNAL MEDICINE

## 2021-01-01 PROCEDURE — 2060000000 HC ICU INTERMEDIATE R&B

## 2021-01-01 PROCEDURE — 2580000003 HC RX 258: Performed by: INTERNAL MEDICINE

## 2021-01-01 PROCEDURE — 83615 LACTATE (LD) (LDH) ENZYME: CPT

## 2021-01-01 PROCEDURE — G8484 FLU IMMUNIZE NO ADMIN: HCPCS | Performed by: INTERNAL MEDICINE

## 2021-01-01 PROCEDURE — 93010 ELECTROCARDIOGRAM REPORT: CPT | Performed by: INTERNAL MEDICINE

## 2021-01-01 PROCEDURE — 2700000000 HC OXYGEN THERAPY PER DAY

## 2021-01-01 PROCEDURE — 82140 ASSAY OF AMMONIA: CPT

## 2021-01-01 PROCEDURE — 96372 THER/PROPH/DIAG INJ SC/IM: CPT

## 2021-01-01 PROCEDURE — 83550 IRON BINDING TEST: CPT

## 2021-01-01 PROCEDURE — 2500000003 HC RX 250 WO HCPCS: Performed by: INTERNAL MEDICINE

## 2021-01-01 PROCEDURE — 99284 EMERGENCY DEPT VISIT MOD MDM: CPT

## 2021-01-01 PROCEDURE — 2580000003 HC RX 258: Performed by: STUDENT IN AN ORGANIZED HEALTH CARE EDUCATION/TRAINING PROGRAM

## 2021-01-01 PROCEDURE — 1123F ACP DISCUSS/DSCN MKR DOCD: CPT | Performed by: INTERNAL MEDICINE

## 2021-01-01 PROCEDURE — 84439 ASSAY OF FREE THYROXINE: CPT

## 2021-01-01 PROCEDURE — 80053 COMPREHEN METABOLIC PANEL: CPT

## 2021-01-01 PROCEDURE — C9113 INJ PANTOPRAZOLE SODIUM, VIA: HCPCS | Performed by: INTERNAL MEDICINE

## 2021-01-01 PROCEDURE — 84484 ASSAY OF TROPONIN QUANT: CPT

## 2021-01-01 PROCEDURE — 80048 BASIC METABOLIC PNL TOTAL CA: CPT

## 2021-01-01 PROCEDURE — 87641 MR-STAPH DNA AMP PROBE: CPT

## 2021-01-01 PROCEDURE — 84100 ASSAY OF PHOSPHORUS: CPT

## 2021-01-01 PROCEDURE — 6360000002 HC RX W HCPCS: Performed by: NURSE PRACTITIONER

## 2021-01-01 PROCEDURE — 82728 ASSAY OF FERRITIN: CPT

## 2021-01-01 PROCEDURE — 83735 ASSAY OF MAGNESIUM: CPT

## 2021-01-01 PROCEDURE — G8419 CALC BMI OUT NRM PARAM NOF/U: HCPCS | Performed by: INTERNAL MEDICINE

## 2021-01-01 PROCEDURE — 74176 CT ABD & PELVIS W/O CONTRAST: CPT

## 2021-01-01 PROCEDURE — 80061 LIPID PANEL: CPT

## 2021-01-01 PROCEDURE — 83880 ASSAY OF NATRIURETIC PEPTIDE: CPT

## 2021-01-01 PROCEDURE — 85610 PROTHROMBIN TIME: CPT

## 2021-01-01 PROCEDURE — 90935 HEMODIALYSIS ONE EVALUATION: CPT

## 2021-01-01 PROCEDURE — 96368 THER/DIAG CONCURRENT INF: CPT

## 2021-01-01 PROCEDURE — 99282 EMERGENCY DEPT VISIT SF MDM: CPT

## 2021-01-01 PROCEDURE — 87635 SARS-COV-2 COVID-19 AMP PRB: CPT

## 2021-01-01 PROCEDURE — G8400 PT W/DXA NO RESULTS DOC: HCPCS | Performed by: INTERNAL MEDICINE

## 2021-01-01 PROCEDURE — 93308 TTE F-UP OR LMTD: CPT

## 2021-01-01 PROCEDURE — 71045 X-RAY EXAM CHEST 1 VIEW: CPT

## 2021-01-01 PROCEDURE — 99285 EMERGENCY DEPT VISIT HI MDM: CPT

## 2021-01-01 PROCEDURE — 82607 VITAMIN B-12: CPT

## 2021-01-01 PROCEDURE — 85025 COMPLETE CBC W/AUTO DIFF WBC: CPT

## 2021-01-01 PROCEDURE — 93280 PM DEVICE PROGR EVAL DUAL: CPT | Performed by: INTERNAL MEDICINE

## 2021-01-01 PROCEDURE — 99205 OFFICE O/P NEW HI 60 MIN: CPT | Performed by: INTERNAL MEDICINE

## 2021-01-01 PROCEDURE — 96375 TX/PRO/DX INJ NEW DRUG ADDON: CPT

## 2021-01-01 PROCEDURE — 96361 HYDRATE IV INFUSION ADD-ON: CPT

## 2021-01-01 PROCEDURE — 82533 TOTAL CORTISOL: CPT

## 2021-01-01 PROCEDURE — P9045 ALBUMIN (HUMAN), 5%, 250 ML: HCPCS | Performed by: NURSE PRACTITIONER

## 2021-01-01 PROCEDURE — 99222 1ST HOSP IP/OBS MODERATE 55: CPT | Performed by: PHYSICIAN ASSISTANT

## 2021-01-01 PROCEDURE — 86140 C-REACTIVE PROTEIN: CPT

## 2021-01-01 PROCEDURE — 6370000000 HC RX 637 (ALT 250 FOR IP): Performed by: NURSE PRACTITIONER

## 2021-01-01 PROCEDURE — 2500000003 HC RX 250 WO HCPCS

## 2021-01-01 PROCEDURE — 99217 PR OBSERVATION CARE DISCHARGE MANAGEMENT: CPT | Performed by: INTERNAL MEDICINE

## 2021-01-01 PROCEDURE — 82803 BLOOD GASES ANY COMBINATION: CPT

## 2021-01-01 PROCEDURE — 5A1D70Z PERFORMANCE OF URINARY FILTRATION, INTERMITTENT, LESS THAN 6 HOURS PER DAY: ICD-10-PCS | Performed by: INTERNAL MEDICINE

## 2021-01-01 PROCEDURE — 85379 FIBRIN DEGRADATION QUANT: CPT

## 2021-01-01 PROCEDURE — 4040F PNEUMOC VAC/ADMIN/RCVD: CPT | Performed by: INTERNAL MEDICINE

## 2021-01-01 PROCEDURE — 80177 DRUG SCRN QUAN LEVETIRACETAM: CPT

## 2021-01-01 PROCEDURE — 96365 THER/PROPH/DIAG IV INF INIT: CPT

## 2021-01-01 PROCEDURE — 70450 CT HEAD/BRAIN W/O DYE: CPT

## 2021-01-01 PROCEDURE — 71250 CT THORAX DX C-: CPT

## 2021-01-01 PROCEDURE — 2580000003 HC RX 258: Performed by: EMERGENCY MEDICINE

## 2021-01-01 PROCEDURE — 84443 ASSAY THYROID STIM HORMONE: CPT

## 2021-01-01 PROCEDURE — 93005 ELECTROCARDIOGRAM TRACING: CPT | Performed by: INTERNAL MEDICINE

## 2021-01-01 PROCEDURE — 93306 TTE W/DOPPLER COMPLETE: CPT

## 2021-01-01 PROCEDURE — 2500000003 HC RX 250 WO HCPCS: Performed by: EMERGENCY MEDICINE

## 2021-01-01 PROCEDURE — 84145 PROCALCITONIN (PCT): CPT

## 2021-01-01 PROCEDURE — 85027 COMPLETE CBC AUTOMATED: CPT

## 2021-01-01 PROCEDURE — G8427 DOCREV CUR MEDS BY ELIG CLIN: HCPCS | Performed by: INTERNAL MEDICINE

## 2021-01-01 PROCEDURE — 83036 HEMOGLOBIN GLYCOSYLATED A1C: CPT

## 2021-01-01 PROCEDURE — 36600 WITHDRAWAL OF ARTERIAL BLOOD: CPT

## 2021-01-01 PROCEDURE — 94640 AIRWAY INHALATION TREATMENT: CPT

## 2021-01-01 PROCEDURE — 1036F TOBACCO NON-USER: CPT | Performed by: INTERNAL MEDICINE

## 2021-01-01 PROCEDURE — 93005 ELECTROCARDIOGRAM TRACING: CPT | Performed by: STUDENT IN AN ORGANIZED HEALTH CARE EDUCATION/TRAINING PROGRAM

## 2021-01-01 PROCEDURE — 82746 ASSAY OF FOLIC ACID SERUM: CPT

## 2021-01-01 RX ORDER — DEXTROSE MONOHYDRATE 25 G/50ML
INJECTION, SOLUTION INTRAVENOUS
Status: COMPLETED
Start: 2021-01-01 | End: 2021-01-01

## 2021-01-01 RX ORDER — ALBUTEROL SULFATE 90 UG/1
2 AEROSOL, METERED RESPIRATORY (INHALATION)
Status: DISCONTINUED | OUTPATIENT
Start: 2021-01-01 | End: 2021-11-19 | Stop reason: HOSPADM

## 2021-01-01 RX ORDER — LEVETIRACETAM 500 MG/1
500 TABLET ORAL 2 TIMES DAILY
Status: DISCONTINUED | OUTPATIENT
Start: 2021-01-01 | End: 2021-01-01 | Stop reason: HOSPADM

## 2021-01-01 RX ORDER — RALOXIFENE HYDROCHLORIDE 60 MG/1
60 TABLET, FILM COATED ORAL DAILY
Status: DISCONTINUED | OUTPATIENT
Start: 2021-01-01 | End: 2021-01-01 | Stop reason: HOSPADM

## 2021-01-01 RX ORDER — ARIPIPRAZOLE 10 MG/1
10 TABLET ORAL DAILY
Status: DISCONTINUED | OUTPATIENT
Start: 2021-01-01 | End: 2021-01-01 | Stop reason: HOSPADM

## 2021-01-01 RX ORDER — ONDANSETRON 4 MG/1
4 TABLET, ORALLY DISINTEGRATING ORAL EVERY 6 HOURS PRN
Status: DISCONTINUED | OUTPATIENT
Start: 2021-01-01 | End: 2021-01-01 | Stop reason: HOSPADM

## 2021-01-01 RX ORDER — MIDODRINE HYDROCHLORIDE 5 MG/1
10 TABLET ORAL ONCE
Status: COMPLETED | OUTPATIENT
Start: 2021-01-01 | End: 2021-01-01

## 2021-01-01 RX ORDER — MORPHINE SULFATE 2 MG/ML
2 INJECTION, SOLUTION INTRAMUSCULAR; INTRAVENOUS
Status: DISCONTINUED | OUTPATIENT
Start: 2021-01-01 | End: 2021-11-19 | Stop reason: HOSPADM

## 2021-01-01 RX ORDER — MAGNESIUM SULFATE 1 G/100ML
1000 INJECTION INTRAVENOUS ONCE
Status: COMPLETED | OUTPATIENT
Start: 2021-01-01 | End: 2021-01-01

## 2021-01-01 RX ORDER — PANTOPRAZOLE SODIUM 40 MG/10ML
40 INJECTION, POWDER, LYOPHILIZED, FOR SOLUTION INTRAVENOUS 2 TIMES DAILY
Status: DISCONTINUED | OUTPATIENT
Start: 2021-01-01 | End: 2021-11-19 | Stop reason: HOSPADM

## 2021-01-01 RX ORDER — POLYETHYLENE GLYCOL 3350 17 G/17G
17 POWDER, FOR SOLUTION ORAL DAILY PRN
Status: DISCONTINUED | OUTPATIENT
Start: 2021-01-01 | End: 2021-01-01 | Stop reason: HOSPADM

## 2021-01-01 RX ORDER — 0.9 % SODIUM CHLORIDE 0.9 %
1000 INTRAVENOUS SOLUTION INTRAVENOUS ONCE
Status: COMPLETED | OUTPATIENT
Start: 2021-01-01 | End: 2021-01-01

## 2021-01-01 RX ORDER — DOCUSATE SODIUM 100 MG/1
100 CAPSULE, LIQUID FILLED ORAL DAILY PRN
Status: DISCONTINUED | OUTPATIENT
Start: 2021-01-01 | End: 2021-01-01 | Stop reason: HOSPADM

## 2021-01-01 RX ORDER — ATROPINE SULFATE 10 MG/ML
1 SOLUTION/ DROPS OPHTHALMIC EVERY 4 HOURS PRN
Status: DISCONTINUED | OUTPATIENT
Start: 2021-01-01 | End: 2021-11-19 | Stop reason: HOSPADM

## 2021-01-01 RX ORDER — VITAMIN B COMPLEX
2000 TABLET ORAL DAILY
Status: DISCONTINUED | OUTPATIENT
Start: 2021-01-01 | End: 2021-11-19 | Stop reason: HOSPADM

## 2021-01-01 RX ORDER — ALBUMIN, HUMAN INJ 5% 5 %
25 SOLUTION INTRAVENOUS ONCE
Status: COMPLETED | OUTPATIENT
Start: 2021-01-01 | End: 2021-01-01

## 2021-01-01 RX ORDER — 0.9 % SODIUM CHLORIDE 0.9 %
500 INTRAVENOUS SOLUTION INTRAVENOUS ONCE
Status: COMPLETED | OUTPATIENT
Start: 2021-01-01 | End: 2021-01-01

## 2021-01-01 RX ORDER — HEPARIN SODIUM 5000 [USP'U]/ML
5000 INJECTION, SOLUTION INTRAVENOUS; SUBCUTANEOUS EVERY 8 HOURS SCHEDULED
Status: DISCONTINUED | OUTPATIENT
Start: 2021-01-01 | End: 2021-01-01

## 2021-01-01 RX ORDER — ASPIRIN 81 MG/1
81 TABLET ORAL DAILY
Status: DISCONTINUED | OUTPATIENT
Start: 2021-01-01 | End: 2021-01-01 | Stop reason: HOSPADM

## 2021-01-01 RX ORDER — HEPARIN SODIUM 1000 [USP'U]/ML
4200 INJECTION, SOLUTION INTRAVENOUS; SUBCUTANEOUS PRN
Status: DISCONTINUED | OUTPATIENT
Start: 2021-01-01 | End: 2021-01-01 | Stop reason: HOSPADM

## 2021-01-01 RX ORDER — DEXTROSE MONOHYDRATE 25 G/50ML
12.5 INJECTION, SOLUTION INTRAVENOUS PRN
Status: DISCONTINUED | OUTPATIENT
Start: 2021-01-01 | End: 2021-11-19 | Stop reason: HOSPADM

## 2021-01-01 RX ORDER — ACETAMINOPHEN 325 MG/1
650 TABLET ORAL EVERY 6 HOURS PRN
Status: DISCONTINUED | OUTPATIENT
Start: 2021-01-01 | End: 2021-01-01 | Stop reason: SDUPTHER

## 2021-01-01 RX ORDER — ASCORBIC ACID 500 MG
500 TABLET ORAL DAILY
Status: DISCONTINUED | OUTPATIENT
Start: 2021-01-01 | End: 2021-01-01 | Stop reason: HOSPADM

## 2021-01-01 RX ORDER — ALBUTEROL SULFATE 2.5 MG/3ML
2.5 SOLUTION RESPIRATORY (INHALATION) EVERY 4 HOURS PRN
Status: DISCONTINUED | OUTPATIENT
Start: 2021-01-01 | End: 2021-01-01

## 2021-01-01 RX ORDER — HEPARIN SODIUM 5000 [USP'U]/ML
5000 INJECTION, SOLUTION INTRAVENOUS; SUBCUTANEOUS EVERY 8 HOURS
Status: DISCONTINUED | OUTPATIENT
Start: 2021-01-01 | End: 2021-01-01 | Stop reason: HOSPADM

## 2021-01-01 RX ORDER — GUAIFENESIN/DEXTROMETHORPHAN 100-10MG/5
5 SYRUP ORAL EVERY 4 HOURS PRN
Status: DISCONTINUED | OUTPATIENT
Start: 2021-01-01 | End: 2021-11-19 | Stop reason: HOSPADM

## 2021-01-01 RX ORDER — MIDODRINE HYDROCHLORIDE 5 MG/1
10 TABLET ORAL
Status: DISCONTINUED | OUTPATIENT
Start: 2021-01-01 | End: 2021-11-19 | Stop reason: HOSPADM

## 2021-01-01 RX ORDER — MORPHINE SULFATE 4 MG/ML
4 INJECTION, SOLUTION INTRAMUSCULAR; INTRAVENOUS
Status: DISCONTINUED | OUTPATIENT
Start: 2021-01-01 | End: 2021-11-19 | Stop reason: HOSPADM

## 2021-01-01 RX ORDER — MORPHINE SULFATE 20 MG/ML
5 SOLUTION ORAL
Status: DISCONTINUED | OUTPATIENT
Start: 2021-01-01 | End: 2021-11-19 | Stop reason: HOSPADM

## 2021-01-01 RX ORDER — ONDANSETRON 2 MG/ML
4 INJECTION INTRAMUSCULAR; INTRAVENOUS EVERY 6 HOURS PRN
Status: DISCONTINUED | OUTPATIENT
Start: 2021-01-01 | End: 2021-11-19 | Stop reason: HOSPADM

## 2021-01-01 RX ORDER — ONDANSETRON 4 MG/1
4 TABLET, ORALLY DISINTEGRATING ORAL EVERY 8 HOURS PRN
Status: DISCONTINUED | OUTPATIENT
Start: 2021-01-01 | End: 2021-01-01 | Stop reason: HOSPADM

## 2021-01-01 RX ORDER — LORAZEPAM 2 MG/ML
1 INJECTION INTRAMUSCULAR EVERY 6 HOURS PRN
Status: DISCONTINUED | OUTPATIENT
Start: 2021-01-01 | End: 2021-11-19 | Stop reason: HOSPADM

## 2021-01-01 RX ORDER — DEXTROSE MONOHYDRATE 25 G/50ML
INJECTION, SOLUTION INTRAVENOUS
Status: DISPENSED
Start: 2021-01-01 | End: 2021-01-01

## 2021-01-01 RX ORDER — ALBUTEROL SULFATE 90 UG/1
2 AEROSOL, METERED RESPIRATORY (INHALATION) 3 TIMES DAILY
Status: DISCONTINUED | OUTPATIENT
Start: 2021-01-01 | End: 2021-11-19 | Stop reason: HOSPADM

## 2021-01-01 RX ORDER — PRAVASTATIN SODIUM 40 MG
40 TABLET ORAL DAILY
Status: DISCONTINUED | OUTPATIENT
Start: 2021-01-01 | End: 2021-01-01 | Stop reason: HOSPADM

## 2021-01-01 RX ORDER — CALCIUM GLUCONATE 20 MG/ML
1000 INJECTION, SOLUTION INTRAVENOUS ONCE
Status: COMPLETED | OUTPATIENT
Start: 2021-01-01 | End: 2021-01-01

## 2021-01-01 RX ORDER — IBUPROFEN 400 MG/1
400 TABLET ORAL ONCE
Status: COMPLETED | OUTPATIENT
Start: 2021-01-01 | End: 2021-01-01

## 2021-01-01 RX ORDER — SODIUM CHLORIDE 0.9 % (FLUSH) 0.9 %
10 SYRINGE (ML) INJECTION EVERY 12 HOURS SCHEDULED
Status: DISCONTINUED | OUTPATIENT
Start: 2021-01-01 | End: 2021-11-19 | Stop reason: HOSPADM

## 2021-01-01 RX ORDER — ACETAMINOPHEN 650 MG/1
650 SUPPOSITORY RECTAL EVERY 6 HOURS PRN
Status: DISCONTINUED | OUTPATIENT
Start: 2021-01-01 | End: 2021-01-01 | Stop reason: HOSPADM

## 2021-01-01 RX ORDER — POTASSIUM CHLORIDE 20 MEQ/1
40 TABLET, EXTENDED RELEASE ORAL ONCE
Status: COMPLETED | OUTPATIENT
Start: 2021-01-01 | End: 2021-01-01

## 2021-01-01 RX ORDER — MIDODRINE HYDROCHLORIDE 5 MG/1
5 TABLET ORAL PRN
Status: DISCONTINUED | OUTPATIENT
Start: 2021-01-01 | End: 2021-01-01 | Stop reason: HOSPADM

## 2021-01-01 RX ORDER — POTASSIUM CHLORIDE 750 MG/1
10 TABLET, EXTENDED RELEASE ORAL DAILY
Status: DISCONTINUED | OUTPATIENT
Start: 2021-01-01 | End: 2021-01-01 | Stop reason: HOSPADM

## 2021-01-01 RX ORDER — HEPARIN SODIUM 1000 [USP'U]/ML
4200 INJECTION, SOLUTION INTRAVENOUS; SUBCUTANEOUS PRN
Status: DISCONTINUED | OUTPATIENT
Start: 2021-01-01 | End: 2021-11-19 | Stop reason: HOSPADM

## 2021-01-01 RX ORDER — ACETAMINOPHEN 500 MG
500 TABLET ORAL EVERY 4 HOURS PRN
Status: DISCONTINUED | OUTPATIENT
Start: 2021-01-01 | End: 2021-01-01 | Stop reason: HOSPADM

## 2021-01-01 RX ORDER — DEXTROSE MONOHYDRATE 25 G/50ML
12.5 INJECTION, SOLUTION INTRAVENOUS ONCE
Status: COMPLETED | OUTPATIENT
Start: 2021-01-01 | End: 2021-01-01

## 2021-01-01 RX ORDER — ACETAMINOPHEN 325 MG/1
650 TABLET ORAL EVERY 6 HOURS PRN
Status: DISCONTINUED | OUTPATIENT
Start: 2021-01-01 | End: 2021-01-01 | Stop reason: HOSPADM

## 2021-01-01 RX ORDER — SODIUM CHLORIDE 9 MG/ML
1000 INJECTION, SOLUTION INTRAVENOUS ONCE
Status: DISCONTINUED | OUTPATIENT
Start: 2021-01-01 | End: 2021-01-01

## 2021-01-01 RX ORDER — PROMETHAZINE HYDROCHLORIDE 25 MG/1
12.5 TABLET ORAL EVERY 6 HOURS PRN
Status: DISCONTINUED | OUTPATIENT
Start: 2021-01-01 | End: 2021-11-19 | Stop reason: HOSPADM

## 2021-01-01 RX ORDER — PANTOPRAZOLE SODIUM 40 MG/1
40 TABLET, DELAYED RELEASE ORAL DAILY
Status: DISCONTINUED | OUTPATIENT
Start: 2021-01-01 | End: 2021-01-01 | Stop reason: HOSPADM

## 2021-01-01 RX ORDER — SENNA PLUS 8.6 MG/1
1 TABLET ORAL NIGHTLY
Status: DISCONTINUED | OUTPATIENT
Start: 2021-01-01 | End: 2021-01-01 | Stop reason: HOSPADM

## 2021-01-01 RX ORDER — ATORVASTATIN CALCIUM 40 MG/1
40 TABLET, FILM COATED ORAL NIGHTLY
Status: DISCONTINUED | OUTPATIENT
Start: 2021-01-01 | End: 2021-01-01 | Stop reason: HOSPADM

## 2021-01-01 RX ORDER — ASPIRIN 325 MG
325 TABLET ORAL ONCE
Status: COMPLETED | OUTPATIENT
Start: 2021-01-01 | End: 2021-01-01

## 2021-01-01 RX ORDER — DEXTROSE, SODIUM CHLORIDE, SODIUM LACTATE, POTASSIUM CHLORIDE, AND CALCIUM CHLORIDE 5; .6; .31; .03; .02 G/100ML; G/100ML; G/100ML; G/100ML; G/100ML
INJECTION, SOLUTION INTRAVENOUS CONTINUOUS
Status: DISCONTINUED | OUTPATIENT
Start: 2021-01-01 | End: 2021-01-01

## 2021-01-01 RX ORDER — ALBUMIN, HUMAN INJ 5% 5 %
50 SOLUTION INTRAVENOUS ONCE
Status: COMPLETED | OUTPATIENT
Start: 2021-01-01 | End: 2021-01-01

## 2021-01-01 RX ORDER — SEVELAMER CARBONATE 800 MG/1
1 TABLET, FILM COATED ORAL
COMMUNITY

## 2021-01-01 RX ORDER — ACETAMINOPHEN 650 MG/1
650 SUPPOSITORY RECTAL EVERY 6 HOURS PRN
Status: DISCONTINUED | OUTPATIENT
Start: 2021-01-01 | End: 2021-11-19 | Stop reason: HOSPADM

## 2021-01-01 RX ORDER — SODIUM CHLORIDE 9 MG/ML
25 INJECTION, SOLUTION INTRAVENOUS PRN
Status: DISCONTINUED | OUTPATIENT
Start: 2021-01-01 | End: 2021-11-19 | Stop reason: HOSPADM

## 2021-01-01 RX ORDER — SODIUM CHLORIDE 9 MG/ML
25 INJECTION, SOLUTION INTRAVENOUS PRN
Status: DISCONTINUED | OUTPATIENT
Start: 2021-01-01 | End: 2021-01-01 | Stop reason: HOSPADM

## 2021-01-01 RX ORDER — ALBUTEROL SULFATE 90 UG/1
2 AEROSOL, METERED RESPIRATORY (INHALATION) EVERY 4 HOURS PRN
Status: DISCONTINUED | OUTPATIENT
Start: 2021-01-01 | End: 2021-01-01

## 2021-01-01 RX ORDER — NICOTINE POLACRILEX 4 MG
15 LOZENGE BUCCAL PRN
Status: DISCONTINUED | OUTPATIENT
Start: 2021-01-01 | End: 2021-11-19 | Stop reason: HOSPADM

## 2021-01-01 RX ORDER — POTASSIUM CHLORIDE 7.45 MG/ML
10 INJECTION INTRAVENOUS PRN
Status: DISCONTINUED | OUTPATIENT
Start: 2021-01-01 | End: 2021-01-01

## 2021-01-01 RX ORDER — DEXTROSE MONOHYDRATE 50 MG/ML
100 INJECTION, SOLUTION INTRAVENOUS PRN
Status: DISCONTINUED | OUTPATIENT
Start: 2021-01-01 | End: 2021-11-19 | Stop reason: HOSPADM

## 2021-01-01 RX ORDER — SODIUM CHLORIDE 0.9 % (FLUSH) 0.9 %
5-40 SYRINGE (ML) INJECTION PRN
Status: DISCONTINUED | OUTPATIENT
Start: 2021-01-01 | End: 2021-01-01 | Stop reason: HOSPADM

## 2021-01-01 RX ORDER — ACETAMINOPHEN 650 MG/1
650 SUPPOSITORY RECTAL EVERY 6 HOURS PRN
Status: DISCONTINUED | OUTPATIENT
Start: 2021-01-01 | End: 2021-01-01 | Stop reason: SDUPTHER

## 2021-01-01 RX ORDER — MIRTAZAPINE 15 MG/1
15 TABLET, FILM COATED ORAL NIGHTLY
Status: DISCONTINUED | OUTPATIENT
Start: 2021-01-01 | End: 2021-01-01 | Stop reason: HOSPADM

## 2021-01-01 RX ORDER — SODIUM CHLORIDE 0.9 % (FLUSH) 0.9 %
10 SYRINGE (ML) INJECTION PRN
Status: DISCONTINUED | OUTPATIENT
Start: 2021-01-01 | End: 2021-11-19 | Stop reason: HOSPADM

## 2021-01-01 RX ORDER — ASPIRIN 81 MG/1
81 TABLET, CHEWABLE ORAL DAILY
Status: DISCONTINUED | OUTPATIENT
Start: 2021-01-01 | End: 2021-01-01 | Stop reason: HOSPADM

## 2021-01-01 RX ORDER — 0.9 % SODIUM CHLORIDE 0.9 %
1000 INTRAVENOUS SOLUTION INTRAVENOUS ONCE
Status: DISCONTINUED | OUTPATIENT
Start: 2021-01-01 | End: 2021-01-01

## 2021-01-01 RX ORDER — MAGNESIUM SULFATE IN WATER 40 MG/ML
2000 INJECTION, SOLUTION INTRAVENOUS PRN
Status: DISCONTINUED | OUTPATIENT
Start: 2021-01-01 | End: 2021-01-01

## 2021-01-01 RX ORDER — DEXAMETHASONE SODIUM PHOSPHATE 10 MG/ML
6 INJECTION INTRAMUSCULAR; INTRAVENOUS DAILY
Status: DISCONTINUED | OUTPATIENT
Start: 2021-01-01 | End: 2021-11-19 | Stop reason: HOSPADM

## 2021-01-01 RX ORDER — SODIUM CHLORIDE 0.9 % (FLUSH) 0.9 %
5-40 SYRINGE (ML) INJECTION EVERY 12 HOURS SCHEDULED
Status: DISCONTINUED | OUTPATIENT
Start: 2021-01-01 | End: 2021-01-01 | Stop reason: HOSPADM

## 2021-01-01 RX ORDER — FLUDROCORTISONE ACETATE 0.1 MG/1
0.1 TABLET ORAL DAILY
Qty: 30 TABLET | Refills: 3 | Status: SHIPPED | OUTPATIENT
Start: 2021-01-01 | End: 2021-12-04

## 2021-01-01 RX ORDER — ACETAMINOPHEN 325 MG/1
650 TABLET ORAL EVERY 6 HOURS PRN
Status: DISCONTINUED | OUTPATIENT
Start: 2021-01-01 | End: 2021-11-19 | Stop reason: HOSPADM

## 2021-01-01 RX ORDER — ALBUTEROL SULFATE 2.5 MG/3ML
2.5 SOLUTION RESPIRATORY (INHALATION) ONCE
Status: DISCONTINUED | OUTPATIENT
Start: 2021-01-01 | End: 2021-11-19 | Stop reason: HOSPADM

## 2021-01-01 RX ORDER — DOXYCYCLINE 100 MG/1
100 TABLET ORAL 2 TIMES DAILY
Status: ON HOLD | COMMUNITY
End: 2021-01-01 | Stop reason: HOSPADM

## 2021-01-01 RX ORDER — MIDODRINE HYDROCHLORIDE 5 MG/1
5 TABLET ORAL
Status: DISCONTINUED | OUTPATIENT
Start: 2021-01-01 | End: 2021-01-01

## 2021-01-01 RX ORDER — ONDANSETRON 2 MG/ML
4 INJECTION INTRAMUSCULAR; INTRAVENOUS EVERY 6 HOURS PRN
Status: DISCONTINUED | OUTPATIENT
Start: 2021-01-01 | End: 2021-01-01 | Stop reason: HOSPADM

## 2021-01-01 RX ORDER — NITROGLYCERIN 0.4 MG/1
0.4 TABLET SUBLINGUAL EVERY 5 MIN PRN
Status: DISCONTINUED | OUTPATIENT
Start: 2021-01-01 | End: 2021-01-01 | Stop reason: HOSPADM

## 2021-01-01 RX ADMIN — SENNOSIDES 8.6 MG: 8.6 TABLET, FILM COATED ORAL at 20:33

## 2021-01-01 RX ADMIN — Medication 2000 UNITS: at 08:47

## 2021-01-01 RX ADMIN — DEXTROSE MONOHYDRATE 12.5 G: 25 INJECTION, SOLUTION INTRAVENOUS at 05:23

## 2021-01-01 RX ADMIN — SODIUM CHLORIDE 500 ML: 9 INJECTION, SOLUTION INTRAVENOUS at 17:22

## 2021-01-01 RX ADMIN — DEXAMETHASONE SODIUM PHOSPHATE 6 MG: 10 INJECTION INTRAMUSCULAR; INTRAVENOUS at 10:23

## 2021-01-01 RX ADMIN — IBUPROFEN 400 MG: 400 TABLET, FILM COATED ORAL at 10:31

## 2021-01-01 RX ADMIN — SODIUM CHLORIDE 1000 ML: 9 INJECTION, SOLUTION INTRAVENOUS at 16:43

## 2021-01-01 RX ADMIN — SODIUM CHLORIDE 500 ML: 9 INJECTION, SOLUTION INTRAVENOUS at 15:51

## 2021-01-01 RX ADMIN — LEVETIRACETAM 500 MG: 100 INJECTION, SOLUTION INTRAVENOUS at 22:09

## 2021-01-01 RX ADMIN — ALBUMIN (HUMAN) 25 G: 12.5 INJECTION, SOLUTION INTRAVENOUS at 22:43

## 2021-01-01 RX ADMIN — SODIUM CHLORIDE, PRESERVATIVE FREE 10 ML: 5 INJECTION INTRAVENOUS at 08:55

## 2021-01-01 RX ADMIN — SODIUM CHLORIDE, SODIUM LACTATE, POTASSIUM CHLORIDE, CALCIUM CHLORIDE AND DEXTROSE MONOHYDRATE: 5; 600; 310; 30; 20 INJECTION, SOLUTION INTRAVENOUS at 08:43

## 2021-01-01 RX ADMIN — ALBUMIN (HUMAN) 50 G: 12.5 INJECTION, SOLUTION INTRAVENOUS at 00:57

## 2021-01-01 RX ADMIN — MORPHINE SULFATE 2 MG: 2 INJECTION, SOLUTION INTRAMUSCULAR; INTRAVENOUS at 21:35

## 2021-01-01 RX ADMIN — ASPIRIN 325 MG: 325 TABLET ORAL at 17:06

## 2021-01-01 RX ADMIN — ARIPIPRAZOLE 10 MG: 10 TABLET ORAL at 10:31

## 2021-01-01 RX ADMIN — METRONIDAZOLE 500 MG: 500 INJECTION, SOLUTION INTRAVENOUS at 18:13

## 2021-01-01 RX ADMIN — VANCOMYCIN HYDROCHLORIDE 500 MG: 500 INJECTION, POWDER, LYOPHILIZED, FOR SOLUTION INTRAVENOUS at 19:32

## 2021-01-01 RX ADMIN — MORPHINE SULFATE 4 MG: 4 INJECTION, SOLUTION INTRAMUSCULAR; INTRAVENOUS at 15:54

## 2021-01-01 RX ADMIN — SODIUM CHLORIDE 1000 ML: 9 INJECTION, SOLUTION INTRAVENOUS at 18:35

## 2021-01-01 RX ADMIN — MIDODRINE HYDROCHLORIDE 5 MG: 5 TABLET ORAL at 17:21

## 2021-01-01 RX ADMIN — LEVETIRACETAM 500 MG: 500 TABLET ORAL at 20:33

## 2021-01-01 RX ADMIN — SODIUM CHLORIDE, PRESERVATIVE FREE 10 ML: 5 INJECTION INTRAVENOUS at 10:24

## 2021-01-01 RX ADMIN — PANTOPRAZOLE SODIUM 40 MG: 40 TABLET, DELAYED RELEASE ORAL at 10:32

## 2021-01-01 RX ADMIN — LEVETIRACETAM 500 MG: 100 INJECTION, SOLUTION INTRAVENOUS at 20:13

## 2021-01-01 RX ADMIN — MAGNESIUM SULFATE HEPTAHYDRATE 1000 MG: 1 INJECTION, SOLUTION INTRAVENOUS at 08:47

## 2021-01-01 RX ADMIN — CALCIUM GLUCONATE 1000 MG: 20 INJECTION, SOLUTION INTRAVENOUS at 03:22

## 2021-01-01 RX ADMIN — Medication 10 ML: at 20:37

## 2021-01-01 RX ADMIN — MIDODRINE HYDROCHLORIDE 10 MG: 5 TABLET ORAL at 10:22

## 2021-01-01 RX ADMIN — SODIUM CHLORIDE, PRESERVATIVE FREE 10 ML: 5 INJECTION INTRAVENOUS at 22:08

## 2021-01-01 RX ADMIN — DOXYCYCLINE 100 MG: 100 INJECTION, POWDER, LYOPHILIZED, FOR SOLUTION INTRAVENOUS at 06:59

## 2021-01-01 RX ADMIN — LORAZEPAM 1 MG: 2 INJECTION INTRAMUSCULAR; INTRAVENOUS at 16:38

## 2021-01-01 RX ADMIN — MIRTAZAPINE 15 MG: 15 TABLET, FILM COATED ORAL at 20:34

## 2021-01-01 RX ADMIN — LEVETIRACETAM 500 MG: 100 INJECTION, SOLUTION INTRAVENOUS at 22:08

## 2021-01-01 RX ADMIN — SODIUM CHLORIDE, PRESERVATIVE FREE 10 ML: 5 INJECTION INTRAVENOUS at 08:47

## 2021-01-01 RX ADMIN — SODIUM CHLORIDE 1000 ML: 9 INJECTION, SOLUTION INTRAVENOUS at 04:24

## 2021-01-01 RX ADMIN — SODIUM CHLORIDE, PRESERVATIVE FREE 10 ML: 5 INJECTION INTRAVENOUS at 22:46

## 2021-01-01 RX ADMIN — MIDODRINE HYDROCHLORIDE 10 MG: 5 TABLET ORAL at 14:00

## 2021-01-01 RX ADMIN — MIDODRINE HYDROCHLORIDE 10 MG: 5 TABLET ORAL at 17:46

## 2021-01-01 RX ADMIN — CALCIUM GLUCONATE 1000 MG: 20 INJECTION, SOLUTION INTRAVENOUS at 20:53

## 2021-01-01 RX ADMIN — Medication 2000 UNITS: at 10:23

## 2021-01-01 RX ADMIN — LEVOTHYROXINE SODIUM 75 MCG: 25 TABLET ORAL at 06:34

## 2021-01-01 RX ADMIN — PANTOPRAZOLE SODIUM 40 MG: 40 INJECTION, POWDER, FOR SOLUTION INTRAVENOUS at 05:13

## 2021-01-01 RX ADMIN — RALOXIFENE HYDROCHLORIDE 60 MG: 60 TABLET, FILM COATED ORAL at 10:31

## 2021-01-01 RX ADMIN — SODIUM PHOSPHATE, MONOBASIC, MONOHYDRATE 30 MMOL: 276; 142 INJECTION, SOLUTION INTRAVENOUS at 01:06

## 2021-01-01 RX ADMIN — MORPHINE SULFATE 4 MG: 4 INJECTION, SOLUTION INTRAMUSCULAR; INTRAVENOUS at 18:22

## 2021-01-01 RX ADMIN — Medication 2 PUFF: at 13:05

## 2021-01-01 RX ADMIN — Medication 2000 UNITS: at 08:55

## 2021-01-01 RX ADMIN — METRONIDAZOLE 500 MG: 500 INJECTION, SOLUTION INTRAVENOUS at 05:24

## 2021-01-01 RX ADMIN — HEPARIN SODIUM 5000 UNITS: 5000 INJECTION INTRAVENOUS; SUBCUTANEOUS at 06:34

## 2021-01-01 RX ADMIN — DEXTROSE MONOHYDRATE 12.5 G: 25 INJECTION, SOLUTION INTRAVENOUS at 08:43

## 2021-01-01 RX ADMIN — CEFEPIME HYDROCHLORIDE 2000 MG: 2 INJECTION, POWDER, FOR SOLUTION INTRAVENOUS at 18:11

## 2021-01-01 RX ADMIN — SODIUM CHLORIDE 1000 ML: 9 INJECTION, SOLUTION INTRAVENOUS at 08:57

## 2021-01-01 RX ADMIN — NITROGLYCERIN 1 INCH: 20 OINTMENT TOPICAL at 00:44

## 2021-01-01 RX ADMIN — PANTOPRAZOLE SODIUM 40 MG: 40 INJECTION, POWDER, FOR SOLUTION INTRAVENOUS at 10:23

## 2021-01-01 RX ADMIN — LEVETIRACETAM 500 MG: 100 INJECTION, SOLUTION INTRAVENOUS at 22:24

## 2021-01-01 RX ADMIN — POTASSIUM CHLORIDE 40 MEQ: 20 TABLET, EXTENDED RELEASE ORAL at 08:47

## 2021-01-01 RX ADMIN — LEVETIRACETAM 500 MG: 100 INJECTION, SOLUTION INTRAVENOUS at 10:58

## 2021-01-01 RX ADMIN — PANTOPRAZOLE SODIUM 40 MG: 40 INJECTION, POWDER, FOR SOLUTION INTRAVENOUS at 08:47

## 2021-01-01 RX ADMIN — POTASSIUM CHLORIDE 10 MEQ: 750 TABLET, EXTENDED RELEASE ORAL at 10:32

## 2021-01-01 RX ADMIN — PANTOPRAZOLE SODIUM 40 MG: 40 INJECTION, POWDER, FOR SOLUTION INTRAVENOUS at 22:46

## 2021-01-01 RX ADMIN — PANTOPRAZOLE SODIUM 40 MG: 40 INJECTION, POWDER, FOR SOLUTION INTRAVENOUS at 22:04

## 2021-01-01 RX ADMIN — LEVETIRACETAM 500 MG: 100 INJECTION, SOLUTION INTRAVENOUS at 10:24

## 2021-01-01 RX ADMIN — SODIUM CHLORIDE, PRESERVATIVE FREE 10 ML: 5 INJECTION INTRAVENOUS at 21:11

## 2021-01-01 RX ADMIN — PANTOPRAZOLE SODIUM 40 MG: 40 INJECTION, POWDER, FOR SOLUTION INTRAVENOUS at 20:10

## 2021-01-01 RX ADMIN — Medication 2 PUFF: at 08:20

## 2021-01-01 RX ADMIN — ASPIRIN 81 MG: 81 TABLET, CHEWABLE ORAL at 10:31

## 2021-01-01 RX ADMIN — LEVETIRACETAM 500 MG: 500 TABLET ORAL at 10:31

## 2021-01-01 RX ADMIN — SERTRALINE 50 MG: 50 TABLET, FILM COATED ORAL at 10:31

## 2021-01-01 ASSESSMENT — ENCOUNTER SYMPTOMS
RHINORRHEA: 0
SHORTNESS OF BREATH: 0
COUGH: 0
ABDOMINAL PAIN: 0
VOMITING: 0
NAUSEA: 0
ABDOMINAL PAIN: 0
COLOR CHANGE: 0
TACHYPNEA: 1
SORE THROAT: 0
SHORTNESS OF BREATH: 0

## 2021-01-01 ASSESSMENT — PAIN SCALES - GENERAL
PAINLEVEL_OUTOF10: 0
PAINLEVEL_OUTOF10: 5
PAINLEVEL_OUTOF10: 0
PAINLEVEL_OUTOF10: 10
PAINLEVEL_OUTOF10: 0
PAINLEVEL_OUTOF10: 10
PAINLEVEL_OUTOF10: 10
PAINLEVEL_OUTOF10: 0
PAINLEVEL_OUTOF10: 5
PAINLEVEL_OUTOF10: 10
PAINLEVEL_OUTOF10: 0
PAINLEVEL_OUTOF10: 6
PAINLEVEL_OUTOF10: 6
PAINLEVEL_OUTOF10: 8

## 2021-01-01 ASSESSMENT — PAIN DESCRIPTION - LOCATION
LOCATION: HEAD
LOCATION: CHEST

## 2021-01-01 ASSESSMENT — PAIN DESCRIPTION - PAIN TYPE: TYPE: ACUTE PAIN

## 2021-01-01 ASSESSMENT — PAIN DESCRIPTION - DESCRIPTORS: DESCRIPTORS: DISCOMFORT

## 2021-01-01 ASSESSMENT — HEART SCORE: ECG: 1

## 2021-04-18 NOTE — ED PROVIDER NOTES
[address] ED  EMERGENCY DEPARTMENT ENCOUNTER      Pt Name: Flynn Cervantes  MRN: [id number]  [place] 1950  Date of evaluation: 4/17/2021  Provider: Elio Arora DO    CHIEF COMPLAINT       Chief Complaint   Patient presents with    Other     pt comes from [facility] where her dialysis catherter came out several hours ago. Bleeding controlled. HISTORY OF PRESENT ILLNESS   (Location/Symptom, Timing/Onset, Context/Setting, Quality, Duration, Modifying Factors, Severity)  Note limiting factors. Flynn Cervantes is a 80 y.o. female who presents to the emergency department complaining of complaining of accidental dislodgment/removal of right chest wall tunneled dialysis catheter. History of dialysis Monday Wednesday Friday, believes that she had. Otherwise has no other complaints. No active bleeding on arrival here. No chest pain or shortness of breath no abdominal pain no nausea no vomiting fevers or chills. Per chart review patient does have a left upper extremity dialysis graft however this appears to be nonfunctioning therefore her tunneled temporary catheter was placed        Nursing Notes were reviewed.     PAST MEDICAL HISTORY     Past Medical History:   Diagnosis Date    Acute kidney failure ([address]     Anemia     Bradycardia     Cognitive communication deficit     Convulsions ([address]     Depressive disorder     ESRD (end stage renal disease) ([address]     Hemodialysis patient ([address]     Hx of blood clots     dvt lower legs    Hyperlipidemia     Hypertension     Muscle wasting     Osteomyelitis of lumbar spine ([address]     Pacemaker     biotronic verified 10/9/2020    Rhinitis, allergic     Seizure ([address]     Sepsis ([address]     Thyroid disease          SURGICAL HISTORY       Past Surgical History:   Procedure Laterality Date    COLONOSCOPY N/A 7/22/2020    COLONOSCOPY WITH BIOPSY performed by Gladys Leach MD at [address] [place] [address] Left 9/17/2020    LEFT UPPER EXTREMITY DIALYSIS GRAFT performed by Dagoberto Dumont MD at [facility] IR NONTUNNELED VASCULAR CATHETER  7/24/2020    IR NONTUNNELED VASCULAR CATHETER [facility]    IR TUNNELED CATHETER PLACEMENT GREATER THAN 5 YEARS  7/28/2020    IR TUNNELED CATHETER PLACEMENT GREATER THAN 5 YEARS 7/28/2020 [address] SPECIAL PROCEDURES    PACEMAKER PLACEMENT      ND THROMBECTOMY,AV FISTULA DIALYS GRFT Left 9/21/2020    THROMBECTOMY, ANGIOGRAM AND REVISION USING DISTAL INFLOW OF DIALYSIS GRAFT performed by Dagoberto Dumont MD at [address] [place] [address] N/A 7/22/2020    EGD BIOPSY performed by Henok Montez MD at [address] [facility]       Previous Medications    ACETAMINOPHEN (TYLENOL) 500 MG TABLET    Take 500 mg by mouth every 4 hours as needed for Pain    ARIPIPRAZOLE (ABILIFY) 10 MG TABLET    Take 10 mg by mouth daily    ASPIRIN 81 MG EC TABLET    Take 81 mg by mouth daily    BISMUTH SUBSALICYLATE (PEPTO BISMOL) 525 MG/15ML SUSPENSION    Take 30 mLs by mouth every 8 hours as needed for Indigestion    DOCUSATE SODIUM (COLACE) 100 MG CAPSULE    Take 100 mg by mouth daily as needed     LEVETIRACETAM (KEPPRA) 500 MG TABLET    Take 500 mg by mouth 2 times daily    LEVOTHYROXINE (SYNTHROID) 75 MCG TABLET    Take 75 mcg by mouth Daily    MIDODRINE (PROAMATINE) 5 MG TABLET    Take 1 tablet by mouth as needed (HD: Give pre and mid tx for Sbp <100)    MIRTAZAPINE (REMERON) 15 MG TABLET    Take 15 mg by mouth nightly    ONDANSETRON (ZOFRAN) 4 MG TABLET    Take 4 mg by mouth every 6 hours as needed for Nausea or Vomiting    PANTOPRAZOLE (PROTONIX) 40 MG TABLET    Take 40 mg by mouth daily    POTASSIUM CHLORIDE (KLOR-CON) 10 MEQ EXTENDED RELEASE TABLET    Take 10 mEq by mouth daily Monday Wednesday Friday dialysis days only    PRAVASTATIN (PRAVACHOL) 40 MG TABLET    Take 40 mg by mouth daily    PROBIOTIC PRODUCT (ACIDOPHILUS PROBIOTIC BLEND PO)    Take 1 tablet by mouth daily    RALOXIFENE (EVISTA) 60 MG TABLET    Take 60 mg by mouth daily    SENNA (SENOKOT) 8.6 MG TABLET    Take 1 tablet by mouth daily as needed for Constipation     SERTRALINE (ZOLOFT) 50 MG TABLET    Take 50 mg by mouth daily    VITAMIN C (ASCORBIC ACID) 500 MG TABLET    Take 500 mg by mouth daily       ALLERGIES     Penicillins    FAMILY HISTORY     No family history on file.        SOCIAL HISTORY       Social History     Socioeconomic History    Marital status: Single     Spouse name: Not on file    Number of children: Not on file    Years of education: Not on file    Highest education level: Not on file   Occupational History    Not on file   Social Needs    Financial resource strain: Not on file    Food insecurity     Worry: Not on file     Inability: Not on file    Transportation needs     Medical: Not on file     Non-medical: Not on file   Tobacco Use    Smoking status: Never Smoker    Smokeless tobacco: Never Used   Substance and Sexual Activity    Alcohol use: Not Currently    Drug use: Not Currently    Sexual activity: Not Currently   Lifestyle    Physical activity     Days per week: Not on file     Minutes per session: Not on file    Stress: Not on file   Relationships    Social connections     Talks on phone: Not on file     Gets together: Not on file     Attends Synagogue service: Not on file     Active member of club or organization: Not on file     Attends meetings of clubs or organizations: Not on file     Relationship status: Not on file    Intimate partner violence     Fear of current or ex partner: Not on file     Emotionally abused: Not on file     Physically abused: Not on file     Forced sexual activity: Not on file   Other Topics Concern    Not on file   Social History Narrative    Not on file       SCREENINGS                            REVIEW OF SYSTEMS    (2-9 systems for level 4, 10 or more for level 5)   Review of Systems Constitutional: Negative for chills and fever. HENT: Negative. Respiratory: Negative for cough and shortness of breath. Gastrointestinal: Negative for abdominal pain, nausea and vomiting. Skin: Negative for pallor. Neurological: Negative for headaches. PHYSICAL EXAM    (up to 7 for level 4, 8 or more for level 5)     ED Triage Vitals [04/17/21 2223]   BP Temp Temp src Pulse Resp SpO2 Height Weight   134/71 97.9 °F (36.6 °C) -- 79 14 100 % 4' 8\" (1.422 m) 78 lb (35.4 kg)       Physical Exam  Constitutional:       Appearance: Normal appearance. HENT:      Head: Normocephalic. Cardiovascular:      Rate and Rhythm: Normal rate and regular rhythm. Comments: Left upper extremity graft site, no palpable thrill. She is neurovascular tact distally however. Pulmonary:      Effort: No respiratory distress. Breath sounds: No stridor. No wheezing. Comments: Right upper chest wall site of tunneled catheter, there is no active bleeding there is no crepitus. Abdominal:      General: Abdomen is flat. There is no distension. Tenderness: There is no abdominal tenderness. Neurological:      Mental Status: She is alert. DIAGNOSTIC RESULTS     EKG: All EKG's are interpreted by the Emergency Department Physician who either signs or Co-signs this chart in the absence of a cardiologist.            RADIOLOGY:   Non-plain film images such as CT, Ultrasound and MRI are read by the radiologist. Plain radiographic images are visualized and preliminarily interpreted by the emergency physician. Interpretation per the Radiologist below, if available at the time of this note:    XR CHEST (2 VW)   Final Result   Stable chronic changes with no acute abnormality seen. Status post removal of the right sided dialysis catheter.                LABS:  Labs Reviewed   CBC WITH AUTO DIFFERENTIAL - Abnormal; Notable for the following components:       Result Value    RBC 3.24 (*)     Hemoglobin clinically significant/life threatening deterioration in the patient's condition which required my urgent intervention. Clinical concern   Intervention     CONSULTS:  None    PROCEDURES:  Unless otherwise noted below, none     Procedures        FINAL IMPRESSION      1. Displacement of vascular dialysis catheter, initial encounter [facility])          DISPOSITION/PLAN   DISPOSITION  dc      [facility]  [place] [facility]  [address]  [phone]    If symptoms worsen      DISCHARGE MEDICATIONS:  New Prescriptions    No medications on file     Controlled Substances Monitoring:     No flowsheet data found.     (Please note that portions of this note were completed with a voice recognition program.  Efforts were made to edit the dictations but occasionally words are mis-transcribed.)    Remington Solorio DO (electronically signed)  Attending Emergency Physician            Remington Solorio DO  04/17/21 5230

## 2021-08-17 PROBLEM — R07.9 CHEST PAIN: Status: ACTIVE | Noted: 2021-01-01

## 2021-08-17 PROBLEM — Z95.0 PACEMAKER: Status: ACTIVE | Noted: 2021-01-01

## 2021-08-17 NOTE — FLOWSHEET NOTE
Patient up to unit in stable condition. Heart monitor applied. Patient alert and oriented. States she goes to dialysis MWF.  Has a pacemaker to upper left chest and dialysis access to upper right chest.      08/17/21 1845   Vital Signs   Temp 97.4 °F (36.3 °C)   Temp Source Oral   Pulse 60   Heart Rate Source Monitor   Resp 15   /66   Patient Position Semi fowlers   Level of Consciousness Alert (0)   MEWS Score 1   Oxygen Therapy   SpO2 98 %   O2 Device None (Room air)

## 2021-08-17 NOTE — ED PROVIDER NOTES
I did not personally evaluate the patient however I interpreted the EKG. The Ekg interpreted by me shows  atrial paced rhythm with a rate of 60  Axis is   Left axis deviation  QTc is  within an acceptable range  Intervals and Durations are unremarkable.       ST Segments: normal               [facility], DO  08/17/21 7138

## 2021-08-17 NOTE — ED PROVIDER NOTES
[address] ED  EMERGENCY DEPARTMENT ENCOUNTER        Pt Name: Yoshi Lopez  MRN: [id number]  [place] 1950  Dateof evaluation: 8/17/2021  Provider: SKYE [place] - [place]  PCP: Irina Ferrer MD  ED Attending: No att. providers found    [facility]       Chief Complaint   Patient presents with    Chest Pain     Pt arrived via EMS from [facility] w/ c/o non-radiating, intermittent chest pain that started this a.m. HISTORY OF PRESENTILLNESS   (Location/Symptom, Timing/Onset, Context/Setting, Quality, Duration, Modifying Factors, Severity)  Note limiting factors. Yoshi Loepz is a 80 y.o. female for chest pain. Onset was today. Context includes pt states she started having chest pain today, pt states it is sometimes pleuritic but there is no radiation of her pain and it is intermittent in nature. Alleviating factors include nothing. Aggravating factors include nothing. Pain is 9/10. Nothing has been used for pain today. Nursing Notes were all reviewed and agreed with or any disagreements were addressed  in the HPI. REVIEW OF SYSTEMS    (2-9 systems for level 4, 10 or more for level 5)     Review of Systems   Constitutional: Negative for fever. HENT: Negative for congestion, rhinorrhea and sore throat. Respiratory: Negative for shortness of breath. Cardiovascular: Positive for chest pain. Gastrointestinal: Negative for abdominal pain. Genitourinary: Negative for decreased urine volume and difficulty urinating. Musculoskeletal: Negative for arthralgias and myalgias. Skin: Negative for color change and rash. Neurological: Negative for dizziness and light-headedness. Psychiatric/Behavioral: Negative for agitation. All other systems reviewed and are negative. Positives and Pertinent negatives as per HPI. Except as noted above in the ROS, all other systems were reviewed and negative.        PAST MEDICAL HISTORY     Past Medical History:   Diagnosis Date    Acute kidney failure (HCC)     Anemia     Bradycardia     Cognitive communication deficit     [facility]     Depressive disorder     ESRD (end stage renal disease) ([facility]     Hemodialysis patient ([facility]     Hx of blood clots     dvt lower legs    Hyperlipidemia     Hypertension     Muscle wasting     Osteomyelitis of lumbar spine ([facility]     Pacemaker     biotronic verified 10/9/2020    Rhinitis, allergic     Seizure ([facility]     Sepsis ([facility]     Thyroid disease          SURGICAL HISTORY       Past Surgical History:   Procedure Laterality Date    COLONOSCOPY N/A 7/22/2020    COLONOSCOPY WITH BIOPSY performed by Dorcas Summers MD at [address] [place] [address] Left 9/17/2020    LEFT UPPER EXTREMITY DIALYSIS GRAFT performed by Kahlil Larry MD at [facility] IR NONTUNNELED VASCULAR CATHETER  7/24/2020    IR NONTUNNELED VASCULAR CATHETER 7/24/2020 [facility] SPECIAL PROCEDURES    IR TUNNELED CATHETER PLACEMENT GREATER THAN 5 YEARS  7/28/2020    IR TUNNELED CATHETER PLACEMENT GREATER THAN 5 YEARS 7[place]28/2020 [facility]    PACEMAKER PLACEMENT      NV THROMBECTOMY,AV FISTULA DIALYS GRFT Left 9/21/2020    THROMBECTOMY, ANGIOGRAM AND REVISION USING DISTAL INFLOW OF DIALYSIS GRAFT performed by Kahlil Larry MD at [address] [place] [address] 7/22/2020    EGD BIOPSY performed by Dorcas Summers MD at [address] [place] [address]       Previous Medications    ACETAMINOPHEN (TYLENOL) 500 MG TABLET    Take 500 mg by mouth every 4 hours as needed for Pain    ARIPIPRAZOLE (ABILIFY) 10 MG TABLET    Take 10 mg by mouth daily    ASPIRIN 81 MG EC TABLET    Take 81 mg by mouth daily    BISMUTH SUBSALICYLATE (PEPTO BISMOL) 525 MG/15ML SUSPENSION    Take 30 mLs by mouth every 8 hours as needed for Indigestion    DOCUSATE SODIUM (COLACE) 100 MG CAPSULE    Take 100 mg by mouth daily as needed Neurological:      Mental Status: She is alert and oriented to person, place, and time.          DIAGNOSTIC RESULTS   LABS:    Labs Reviewed   TROPONIN - Abnormal; Notable for the following components:       Result Value    Troponin 0.06 (*)     All other components within normal limits    Narrative:     Performed at:  [facility],  [place]   Phone [phone]   CBC WITH AUTO DIFFERENTIAL - Abnormal; Notable for the following components:    RBC 3.45 (*)     Hemoglobin 11.3 (*)     Hematocrit 34.7 (*)     .7 (*)     RDW 17.7 (*)     All other components within normal limits    Narrative:     Performed at:  [place] [address],  [email or web address], [facility]   Phone [phone]   COMPREHENSIVE METABOLIC PANEL W/ REFLEX TO MG FOR LOW K - Abnormal; Notable for the following components:    BUN 36 (*)     CREATININE 4.7 (*)     GFR Non- 9 (*)     GFR  11 (*)     Total Protein 4.9 (*)     Albumin 2.8 (*)     Alkaline Phosphatase 424 (*)     All other components within normal limits    Narrative:     Performed at:  [facility]) - [facility],  [place] [facility]   Phone [phone]   BRAIN NATRIURETIC PEPTIDE - Abnormal; Notable for the following components:    Pro-BNP 29,930 (*)     All other components within normal limits    Narrative:     Performed at:  [place] [address],  [place], [facility]   Phone [phone]   PROTIME-INR    Narrative:     Performed at:  [place] [address],  [place], [facility]   Phone [phone]   LEVETIRACETAM LEVEL    Narrative:     Performed at:  [facility]  [address] [facility] [address]   Phone [phone]       All other labs werewithin normal range or not returned as of this dictation. EKG: All EKG's are interpreted by the Emergency Department Physician who either signs or Co-signs this chart in the absence of a cardiologist.  Please see their note for interpretation of EKG. RADIOLOGY:   Portable chest x-ray interpreted by radiologist for  FINDINGS:   Cardiac silhouette is upper normal limits in size. Mediastinal contours are   otherwise suboptimally evaluated due to rotated projection. Lungs demonstrate   no focal consolidation or pleural effusion. No pneumothorax appreciated on   this projection. Transvenous pacer, right internal jugular line with tip   projecting over the right atrium, and intramedullary ivonne with screws in the   right humerus noted. Interpretation per the Radiologist below, if available at the time of this note:    XR CHEST PORTABLE   Final Result   No airspace disease by radiograph. No results found. PROCEDURES   Unless otherwise noted below, none     Procedures     CRITICAL CARE TIME   N/A    CONSULTS:  IP CONSULT TO HOSPITALIST  IP CONSULT TO NEPHROLOGY  IP CONSULT TO CARDIOLOGY      EMERGENCYDEPARTMENT COURSE and DIFFERENTIAL DIAGNOSIS/MDM:   Vitals:    Vitals:    08/17/21 1630 08/17/21 1700 08/17/21 1730 08/17/21 1800   BP: 122/76 120/67 115/69 112/69   Pulse: 60 60 60 68   Resp: 14 14 14 12   Temp:       TempSrc:       SpO2:  100% 99% 99%   Weight:       Height:           Patient was given the following medications:  Medications   aspirin tablet 325 mg (325 mg Oral Given 8/17/21 1706)     Patient was seen and evaluated by myself and Dr. Royal Alves. Patient here for concerns for chest pain that started today. Patient denies any cardiac history however does have a history of hypertension. Patient is a dialysis patient and receives dialysis Monday Wednesday and Friday. She is scheduled for dialysis tomorrow. Patient denies any fevers nausea vomiting but has had some diarrhea.   On exam the patient is awake and alert

## 2021-08-17 NOTE — H&P
Hospital Medicine History & Physical      PCP: Lissa Cardenas MD    Date of Admission: 8/17/2021    Date of Service: Pt seen/examined on 8/17/2021     Chief Complaint:    Chief Complaint   Patient presents with    Chest Pain     Pt arrived via EMS from [facility] w/ c/o non-radiating, intermittent chest pain that started this a.m. History Of Present Illness: The patient is a 80 y.o. female with ESRD on HD,  HLD, sp pacemaker, dementia, resides at [facility] who presented to [facility] ED with complaint of chest pain. Patient describes pain as a dull aching sensation on the right side of her chest.  She states it started this morning. She does not recall what she was doing when the pain started. She states the pain does not radiate. It is not associated with any symptoms. She denies dyspnea, diaphoresis, dizziness, nausea, vomiting. She denies a history of pain like this in the past.  She states \"I think I bruised it. \". She denies any trauma. She is a poor historian, she has dementia. ER work-up revealed an afebrile patient. Stable vital signs. EKG has an atrial paced rhythm. Troponin elevated 0.06, it was elevated to 0.13 in October 2020 when she was admitted for syncopal episode. Chest x-ray was negative for acute findings. She will be admitted for further care and evaluation of chest pain.     Past Medical History:        Diagnosis Date    Acute kidney failure (HCC)     Anemia     Bradycardia     Cognitive communication deficit     Convulsions ([address]     Depressive disorder     ESRD (end stage renal disease) ([address]     Hemodialysis patient ([address]     Hx of blood clots     dvt lower legs    Hyperlipidemia     Hypertension     Muscle wasting     Osteomyelitis of lumbar spine ([address]     Pacemaker     biotronic verified 10/9/2020    Rhinitis, allergic     Seizure ([address]     Sepsis ([address]     Thyroid disease        Past Surgical History:        Procedure Laterality Date    COLONOSCOPY N/A 7/22/2020    COLONOSCOPY WITH BIOPSY performed by Rachell Vang MD at [address] Left 9/17/2020    LEFT UPPER EXTREMITY DIALYSIS GRAFT performed by Jevon Davison MD at [facility] IR NONTUNNELED VASCULAR CATHETER  7/24/2020    IR NONTUNNELED VASCULAR CATHETER 7/24/2020 [address] SPECIAL PROCEDURES    IR TUNNELED CATHETER PLACEMENT GREATER THAN 5 YEARS  7/28/2020    IR TUNNELED CATHETER PLACEMENT GREATER THAN 5 YEARS 7/28/2020 [facility] SPECIAL PROCEDURES    PACEMAKER PLACEMENT      [place] THROMBECTOMY,AV FISTULA DIALYS 2011 [facility] Left 9/21/2020    THROMBECTOMY, ANGIOGRAM AND REVISION USING DISTAL INFLOW OF DIALYSIS GRAFT performed by Jevon Davison MD at [facility] N/A 7/22/2020    EGD BIOPSY performed by Rachell Vang MD at [address]  [facility]       Medications Prior to Admission:    Prior to Admission medications    Medication Sig Start Date End Date Taking?  Authorizing Provider   pantoprazole (PROTONIX) 40 MG tablet Take 40 mg by mouth daily    Historical Provider, MD   Probiotic Product (ACIDOPHILUS PROBIOTIC BLEND PO) Take 1 tablet by mouth daily    Historical Provider, MD   mirtazapine (REMERON) 15 MG tablet Take 15 mg by mouth nightly    Historical Provider, MD   potassium chloride (KLOR-CON) 10 MEQ extended release tablet Take 10 mEq by mouth daily Monday Wednesday Friday dialysis days only    Historical Provider, MD   midodrine (PROAMATINE) 5 MG tablet Take 1 tablet by mouth as needed (HD: Give pre and mid tx for Sbp <100) 7/28/20   Canary Binder, MD   bismuth subsalicylate (PEPTO BISMOL) 525 MG/15ML suspension Take 30 mLs by mouth every 8 hours as needed for Indigestion    Historical Provider, MD   levothyroxine (SYNTHROID) 75 MCG tablet Take 75 mcg by mouth Daily    Historical Provider, MD   ARIPiprazole (ABILIFY) 10 MG tablet Take 10 mg by mouth daily    Historical Provider, MD   aspirin 81 MG EC tablet Take 81 mg by mouth daily    Historical Provider, MD   docusate sodium (COLACE) 100 MG capsule Take 100 mg by mouth daily as needed     Historical Provider, MD   levETIRAcetam (KEPPRA) 500 MG tablet Take 500 mg by mouth 2 times daily    Historical Provider, MD   raloxifene (EVISTA) 60 MG tablet Take 60 mg by mouth daily    Historical Provider, MD   senna (SENOKOT) 8.6 MG tablet Take 1 tablet by mouth daily as needed for Constipation     Historical Provider, MD   vitamin C (ASCORBIC ACID) 500 MG tablet Take 500 mg by mouth daily    Historical Provider, MD   sertraline (ZOLOFT) 50 MG tablet Take 50 mg by mouth daily    Historical Provider, MD   pravastatin (PRAVACHOL) 40 MG tablet Take 40 mg by mouth daily    Historical Provider, MD   acetaminophen (TYLENOL) 500 MG tablet Take 500 mg by mouth every 4 hours as needed for Pain    Historical Provider, MD   ondansetron (ZOFRAN) 4 MG tablet Take 4 mg by mouth every 6 hours as needed for Nausea or Vomiting    Historical Provider, MD       Allergies:  Penicillins    Social History:  The patient currently lives at [address] [place] [address]:   reports that she has never smoked. She has never used smokeless tobacco.  ETOH:   reports previous alcohol use. Family History:   Positive as follows:    History reviewed. No pertinent family history.     REVIEW OF SYSTEMS:       Constitutional: Negative for fever   HENT: Negative for sore throat   Eyes: Negative for redness   Respiratory: Negative  for dyspnea, cough   Cardiovascular: +for chest pain   Gastrointestinal: Negative for vomiting, diarrhea   Genitourinary: Negative for hematuria   Musculoskeletal: Negative for arthralgias   Skin: Negative for rash   Neurological: Negative for syncope   Hematological: Negative for adenopathy   Psychiatric/Behavorial: Negative for anxiety    PHYSICAL EXAM:    /69   Pulse 68   Temp 98.3 °F (36.8 °C) (Oral)   Resp 12   Ht 4' 8\" (1.422 m)   Wt 75 lb (34 kg)   SpO2 99%   BMI 16.81 kg/m²     Gen: No distress. Alert. Eyes: PERRL. No sclera icterus. No conjunctival injection. ENT: No discharge. Pharynx clear. Neck: No JVD. No Carotid Bruit. Trachea midline. Resp: No accessory muscle use. No crackles. No wheezes. No rhonchi. CV: Regular rate. Regular rhythm. No murmur. No rub. No edema. Chest wall is no TTP. No rashes noted. TDC in right chest wall  Pacemaker palpated left upper chest   Capillary Refill: Brisk,< 3 seconds   Peripheral Pulses: +2 palpable, equal bilaterally   GI: Non-tender. Non-distended. No masses. No organomegaly. Normal bowel sounds. No hernia. Skin: Warm and dry. No nodule on exposed extremities. No rash on exposed extremities. Scattered bruising    M/S: No cyanosis. No joint deformity. No clubbing. Neuro: Awake. Grossly nonfocal    Psych: Oriented x 3. No anxiety or agitation. CBC:   Recent Labs     08/17/21  1500   WBC 7.4   HGB 11.3*   HCT 34.7*   .7*        BMP:   Recent Labs     08/17/21  1500      K 4.0      CO2 23   BUN 36*   CREATININE 4.7*     LIVER PROFILE:   Recent Labs     08/17/21  1500   AST 23   ALT 17   BILITOT 0.4   ALKPHOS 424*     PT/INR:   Recent Labs     08/17/21  1500   PROTIME 10.9   INR 0.97     APTT: No results for input(s): APTT in the last 72 hours. UA:No results for input(s): NITRITE, COLORU, PHUR, LABCAST, WBCUA, RBCUA, MUCUS, TRICHOMONAS, YEAST, BACTERIA, CLARITYU, SPECGRAV, LEUKOCYTESUR, UROBILINOGEN, BILIRUBINUR, BLOODU, GLUCOSEU, AMORPHOUS in the last 72 hours.     Invalid input(s): Sylvia Paddy       CARDIAC ENZYMES  Recent Labs     08/17/21  1500   TROPONINI 0.06*       U/A:    Lab Results   Component Value Date    COLORU Yellow 07/17/2020    WBCUA 21-50 07/17/2020    RBCUA  07/17/2020    MUCUS 2+ 07/17/2020    BACTERIA 3+ 07/17/2020    CLARITYU SL CLOUDY 07/17/2020    SPECGRAV 1.025 07/17/2020    LEUKOCYTESUR MODERATE 07/17/2020    BLOODU LARGE 07/17/2020    GLUCOSEU Negative 07/17/2020 [facility]  No results found for: HSG3BMH, BEART, G1MJRQLH, PHART, THGBART, QRR1IDH, PO2ART, MRB1JDE    CULTURES  None     EKG:  I have reviewed the EKG with the following interpretation:   Atrial paced rhythm     RADIOLOGY  XR CHEST PORTABLE   Final Result   No airspace disease by radiograph. ASSESSMENT/PLAN:    #Chest pain  - unclear etiology, she is a poor historian.   - EKG atrial paced  - initial trop 0.06, suspect elevation partly related to ESRD. Will trend  - CXR unrevealing   - NPO after MN, cardio c/s     #pacemaker in place  - limited past records in place to review      #ESRD on HD  - MWF  - c/s nephro    #Hypothyroidism  - cont synthroid    #History of seizures   - cont Keppra     DVT Prophylaxis: Lovenox   Diet: Diet NPO  ADULT DIET; Regular; Low Sodium (2 gm);  No Caffeine  Code Status: Full Code    Ender Cárdenas PA-C  8/17/2021 6:43 PM

## 2021-08-17 NOTE — ED NOTES
Pt arrived by squad from [facility], for chest pain. Was given nitro, 324 asa and tylenol prior to arrival. Pt alert and oriented.       Slava Rodriguez RN  08/17/21 6743

## 2021-08-17 NOTE — ED PROVIDER NOTES
ED Attending Attestation Note    This patient was seen by the advance practice provider. I have seen and examined the patient. I agree with the workup, evaluation, management, and diagnosis. The care plan has been discussed. My assessment reveals 81 yo F who presents with chest pain. Nontoxic, NAD. Cardiac exam RRR no MRG. Lungs CTAB. No periph edema or JVD elevation. R anterior vas cath in place without surrounding erythema or fluctuance. EKG interpreted by myself. Rate: 60  Rhythm: a-paced  Axis: -27  Intervals:  QRS 66 QTc 420  ST Segments: no acute abnormality  T waves: inversion in V1  Comparison: Compared to 10/8/20, rhythm a-paced now with prolonged AV conduction  Impression: a-paced with prolonged AV conduction, nonspecific T wave abnormality    For further details of the patient's emergency department visit, please see the advanced practice provider's documentation. Adan Burt MD     This report has been produced using speech recognition software and may contain errors related to that system including errors in grammar, punctuation, and spelling, as well as words and phrases that may be inappropriate. If there are any questions or concerns please feel free to contact the dictating provider for clarification.        Adan Burt MD  08/17/21 2013

## 2021-08-18 NOTE — PROGRESS NOTES
IV removed without difficulty and dry dressing in place. Telemetry monitor removed and returned to [facility]. Pt left facility in stable condition to Nursing Home with all of their personal belongings.      Report called to [facility]

## 2021-08-18 NOTE — FLOWSHEET NOTE
08/18/21 0234   Vital Signs   Temp 97.5 °F (36.4 °C)   Temp Source Oral   Pulse 68   Heart Rate Source Monitor   Resp 16   BP (!) 94/59   BP Location Right upper arm   Patient Position Semi fowlers   Level of Consciousness Alert (0)   MEWS Score 2   Oxygen Therapy   SpO2 100 %   O2 Device None (Room air)     Pt resting in bed, able to be woken for vitals. A/O x 4. Pt states she is feeling a lot better. Bed alarm on, call light within reach. Will continue to monitor.  Lucia Villatoro, RN

## 2021-08-18 NOTE — DISCHARGE INSTR - COC
Continuity of Care Form    Patient Name: Braeden Tavera   :  1950  MRN:  [id number]    Admit date:  2021  Discharge date:  2021    Code Status Order: Full Code   Advance Directives:      Admitting Physician:  Ashley Garza MD  PCP: Cari Humphrey MD    Discharging Nurse: [facility] Unit/Room#: /1431-77  Discharging Unit Phone Number: [phone]    Emergency Contact:   Extended Emergency Contact Information  Primary Emergency Contact: caleb menchaca  Home Phone: [phone]  Mobile Phone: [phone]  Relation: Brother/Sister    Past Surgical History:  Past Surgical History:   Procedure Laterality Date    COLONOSCOPY N/A 2020    COLONOSCOPY WITH BIOPSY performed by Yareli Epps MD at [address] [place] [address] 2020    LEFT UPPER EXTREMITY DIALYSIS GRAFT performed by Kristin Santamaria MD at [facility] IR NONTUNNELED VASCULAR CATHETER  2020    IR NONTUNNELED VASCULAR CATHETER 2020 [address] SPECIAL PROCEDURES    IR TUNNELED CATHETER PLACEMENT GREATER THAN 5 YEARS  2020    IR TUNNELED CATHETER PLACEMENT GREATER THAN 5 YEARS 2020 [address] SPECIAL PROCEDURES    PACEMAKER PLACEMENT      [place] THROMBECTOMY,AV FISTULA DIALYS  [facility] 2020    THROMBECTOMY, ANGIOGRAM AND REVISION USING DISTAL INFLOW OF DIALYSIS GRAFT performed by Kristin Santamaria MD at [facility] 2020    EGD BIOPSY performed by Yareli Epps MD at [address] [place] [address]       Immunization History:   Immunization History   Administered Date(s) Administered    COVID-19, Grissom Peter, PF, 30mcg/0.3mL 2020, 2021       Active Problems:  Patient Active Problem List   Diagnosis Code    JEN (acute kidney injury) ([facility] N17.9    Seizures ([facility] R56.9    Acquired hypothyroidism E03.9    GI bleed K92.2    Proctitis K62.89    Acute blood loss anemia C10    Metabolic acidosis X88.4  Anemia D64.9    Essential hypertension I10    End stage renal disease (HCC) N18.6    Ischemic steal syndrome (HCC) T82.898A    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    Syncope and collapse R55    Chest pain R07.9    Pacemaker Z95.0       Isolation/Infection:   Isolation            No Isolation          Patient Infection Status       Infection Onset Added Last Indicated Last Indicated By Review Planned Expiration Resolved Resolved By    None active    Resolved    COVID-19 Rule Out 10/09/20 10/09/20 10/09/20 COVID-19 (Ordered)   10/09/20 Rule-Out Test Resulted    COVID-19 Rule Out 09/21/20 09/21/20 09/21/20 COVID-19 (Ordered)   09/21/20 Rule-Out Test Resulted    C-diff Rule Out 07/18/20 07/18/20 07/19/20 Clostridium Difficile Toxin/Antigen (Ordered)   07/19/20 Rule-Out Test Resulted    COVID-19 Rule Out 07/17/20 07/17/20 07/17/20 COVID-19 (Ordered)   07/20/20 Rule-Out Test Resulted            Nurse Assessment:  Last Vital Signs: BP (!) 94/59   Pulse 68   Temp 97.5 °F (36.4 °C) (Oral)   Resp 16   Ht 4' 8\" (1.422 m)   Wt 78 lb (35.4 kg)   SpO2 100%   BMI 17.49 kg/m²     Last documented pain score (0-10 scale): Pain Level: 8  Last Weight:   Wt Readings from Last 1 Encounters:   08/18/21 78 lb (35.4 kg)     Mental Status:  alert and hx dementia    IV Access:  - None    Nursing Mobility/ADLs:  Walking   Assisted  Transfer  Assisted  Bathing  Assisted  Dressing  Assisted  Toileting  Assisted  Feeding  Independent  Med Admin  Assisted  Med Delivery   whole    Wound Care Documentation and Therapy:        Elimination:  Continence:   · Bowel: Yes  · Bladder: Yes  Urinary Catheter: None   Colostomy/Ileostomy/Ileal Conduit: No       Date of Last BM: at [facility]  No intake or output data in the 24 hours ending 08/18/21 0953  No intake/output data recorded. Safety Concerns:      At Risk for Falls, hx dementia    Impairments/Disabilities:      Vision and Hearing    Nutrition Therapy:  Current Nutrition Therapy:   - Oral Diet:  Renal    Routes of Feeding: Oral  Liquids: No Restrictions  Daily Fluid Restriction: no  Last Modified Barium Swallow with Video (Video Swallowing Test): not done    Treatments at the Time of Hospital Discharge:   Respiratory Treatments: see MAR  Oxygen Therapy:  is not on home oxygen therapy. Ventilator:    - No ventilator support    Rehab Therapies: Physical Therapy and Occupational Therapy  Weight Bearing Status/Restrictions: No weight bearing restirctions  Other Medical Equipment (for information only, NOT a DME order):  crutches, walker, bath bench, bedside commode and hospital bed  Other Treatments: --    Patient's personal belongings (please select all that are sent with patient):  Patient leaving facility with all personal belongings that she was admitted with    RN SIGNATURE:  Electronically signed by Karl Alex RN on 8/18/21 at 4:37 PM EDT    CASE MANAGEMENT/SOCIAL WORK SECTION    Status Date: 08/17/2021    Readmission Risk Assessment Score:  Readmission Risk              Risk of Unplanned Readmission:  20           Discharging to Facility/ Agency   · Name: [facility]   · Address  [address] [address]   · Phone: [phone]  · Fax: [phone]    Dialysis Facility (if applicable)   · Name: [facility]    · Address:   · Dialysis Schedule: Monday, Wednesday, Friday at 11:00am chair time   · Phone: [phone]  · Fax:    / signature: Electronically signed by Trish Parada on 8/18/21 at 10:48 AM EDT    PHYSICIAN SECTION    Prognosis: Fair    Condition at Discharge: Stable    Rehab Potential (if transferring to Rehab): Fair    Recommended Labs or Other Treatments After Discharge: none    Physician Certification: I certify the above information and transfer of Singh Horowitz  is necessary for the continuing treatment of the diagnosis listed and that she requires Edson Garciauel for less 30 days. Update Admission H&P: No change in H&P    PHYSICIAN SIGNATURE:  Electronically signed by Rachid Balderas MD on 8/18/21 at 10:03 AM EDT

## 2021-08-18 NOTE — PROGRESS NOTES
Admit: 2021    Name:  Mo Reid  Room:  /3706-96  MRN:    [id number]    Daily Progress Note for 2021     Interval History:     Scheduled Meds:   levothyroxine  75 mcg Oral Daily    ARIPiprazole  10 mg Oral Daily    aspirin  81 mg Oral Daily    levETIRAcetam  500 mg Oral BID    raloxifene  60 mg Oral Daily    senna  1 tablet Oral Nightly    vitamin C  500 mg Oral Daily    sertraline  50 mg Oral Daily    pravastatin  40 mg Oral Daily    mirtazapine  15 mg Oral Nightly    potassium chloride  10 mEq Oral Daily    pantoprazole  40 mg Oral Daily    sodium chloride flush  5-40 mL Intravenous 2 times per day    aspirin  81 mg Oral Daily    atorvastatin  40 mg Oral Nightly    nitroglycerin  1 inch Topical 4 times per day    heparin (porcine)  5,000 Units Subcutaneous Q8H       Continuous Infusions:   sodium chloride         PRN Meds:  docusate sodium, acetaminophen, ondansetron, bismuth subsalicylate, midodrine, sodium chloride flush, sodium chloride, ondansetron **OR** ondansetron, acetaminophen **OR** acetaminophen, polyethylene glycol, nitroGLYCERIN                  Objective:     Temp  Av.7 °F (36.5 °C)  Min: 97.4 °F (36.3 °C)  Max: 98.3 °F (36.8 °C)  Pulse  Av.2  Min: 60  Max: 68  BP  Min: 94/59  Max: 122/76  SpO2  Av.4 %  Min: 98 %  Max: 100 %  No data found. No intake or output data in the 24 hours ending 21 0757    Physical Exam:  Gen: No distress. Alert. Eyes: PERRL. No sclera icterus. No conjunctival injection. ENT: No discharge. Pharynx clear. Neck: No JVD. No Carotid Bruit. Trachea midline. Resp: No accessory muscle use. No crackles. No wheezes. No rhonchi. CV: Regular rate. Regular rhythm. No murmur. No rub. No edema. Chest wall is no TTP. No rashes noted.    TDC in right upper chest wall near axilla   Pacemaker palpated left upper chest   Capillary Refill: Brisk,< 3 seconds   Peripheral Pulses: +2 palpable, equal bilaterally   GI: Non-tender. Non-distended. No masses. No organomegaly. Normal bowel sounds. No hernia. Skin: Warm and dry. No nodule on exposed extremities. No rash on exposed extremities. Scattered bruising    M/S: No cyanosis. No joint deformity. No clubbing. Neuro: Awake. Grossly nonfocal    Psych: Oriented x 3. No anxiety or agitation. Lab Data:  CBC:   Recent Labs     08/17/21  1500 08/18/21  0556   WBC 7.4 6.2   RBC 3.45* 3.24*   HGB 11.3* 10.7*   HCT 34.7* 32.9*   .7* 101.3*   RDW 17.7* 18.0*    163     BMP:   Recent Labs     08/17/21  1500      K 4.0      CO2 23   BUN 36*   CREATININE 4.7*     BNP: No results for input(s): BNP in the last 72 hours. PT/INR:   Recent Labs     08/17/21  1500   PROTIME 10.9   INR 0.97     APTT:No results for input(s): APTT in the last 72 hours. CARDIAC ENZYMES:   Recent Labs     08/17/21  1500 08/17/21  1949 08/17/21  2226   TROPONINI 0.06* 0.03* 0.05*     FASTING LIPID PANEL:No results found for: CHOL, HDL, TRIG  LIVER PROFILE:   Recent Labs     08/17/21  1500   AST 23   ALT 17   BILITOT 0.4   ALKPHOS 424*       EKG:  I have reviewed the EKG with the following interpretation:   Atrial paced rhythm      RADIOLOGY  XR CHEST PORTABLE   Final Result   No airspace disease by radiograph.             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    Likely musculoskeletal  - EKG atrial paced  - initial trop 0.06, suspect elevation partly related to ESRD. Will trend. Flat. Levels better than her baseline   - CXR unrevealing   Ibuprofen prn  Apply ice   Consider bone scan as outpatient if pain persists      #pacemaker in place  - limited past records in place to review       #ESRD on HD  - MWF  - c/s nephro     #Hypothyroidism  - cont synthroid     #History of seizures   - cont Keppra      DVT Prophylaxis: Lovenox   Diet: Diet NPO  ADULT DIET; Regular; Low Sodium (2 gm);  No Caffeine  Code Status: Full Code      Dc to NH after HD     Morris Velez MD

## 2021-08-18 NOTE — FLOWSHEET NOTE
08/17/21 1845   Vital Signs   Temp 97.4 °F (36.3 °C)   Temp Source Oral   Pulse 60   Heart Rate Source Monitor   Resp 15   /66   Patient Position Semi fowlers   Level of Consciousness Alert (0)   MEWS Score 1   Oxygen Therapy   SpO2 98 %   O2 Device None (Room air)     Pt awake in bed, A/O x 4, cn be difficult to get attention, able to answer questions appropriately. Comes from [facility], pt unable to determine meds and other information, paperwork from [facility] utilized. Both IV's in right arm painful, then removed. Meds completed, shift assessment completed. Seizure pads applied, bed alarm on, call light within reach, will continue to monitor.  Kina Macias RN

## 2021-08-18 NOTE — PROGRESS NOTES
Care Plan, Education, Fall Risk, Brent Scale, and Lift Assessment complete. Patient is resting and reports no needs at this time.

## 2021-08-18 NOTE — PROGRESS NOTES
Shift assessment complete. See flow sheet. Scheduled meds given. See MAR. Hemodialysis to be run in about two hours and patient to be picked up around 645PM tonight. Family and patient updated. Patients head-toe complete, VS are logged, active bowel sound noted in all four quadrants. No needs are noted at this time. Call light and bedside table are within reach. The bed is locked and is in the lowest position.

## 2021-08-18 NOTE — CONSULTS
[email or web address]  Nephrology Consult Note           Reason for Consult:  ESRD  Requesting Physician:  Dr. Kj Flaherty    Chief Complaint:    Chief Complaint   Patient presents with    Chest Pain     Pt arrived via EMS from [facility] w/ c/o non-radiating, intermittent chest pain that started this a.m.        History of Present Illness on 8/18/21:    80 y.o. yo female with PMH of ESRD, HTN, CAD, Dementia who is admitted for chest pain  Worked up by IM and thought to be MSK    Past Medical History:        Diagnosis Date    Acute kidney failure ([address]     Anemia     Bradycardia     Cognitive communication deficit     Convulsions ([address]     Depressive disorder     ESRD (end stage renal disease) ([address]     Hemodialysis patient ([address]     Hx of blood clots     dvt lower legs    Hyperlipidemia     Hypertension     Muscle wasting     Osteomyelitis of lumbar spine ([address]     Pacemaker     biotronic verified 10/9/2020    Rhinitis, allergic     Seizure ([address]     Sepsis ([address]     Thyroid disease        Past Surgical History:        Procedure Laterality Date    COLONOSCOPY N/A 7/22/2020    COLONOSCOPY WITH BIOPSY performed by Antonio Sinha MD at [facility] Left 9/17/2020    LEFT UPPER EXTREMITY DIALYSIS GRAFT performed by Sierra Dwyer MD at [facility] IR NONTUNNELED VASCULAR CATHETER  7/24/2020    IR NONTUNNELED VASCULAR CATHETER 7/24/2020 [facility] IR TUNNELED CATHETER PLACEMENT GREATER THAN 5 YEARS  7/28/2020    IR TUNNELED CATHETER PLACEMENT GREATER THAN 5 YEARS 7/28/2020 [facility] PROCEDURES    PACEMAKER PLACEMENT      ME THROMBECTOMY,AV FISTULA DIALYS GRFT Left 9/21/2020    THROMBECTOMY, ANGIOGRAM AND REVISION USING DISTAL INFLOW OF DIALYSIS GRAFT performed by Sierra Dwyer MD at [address] [place] [address] 7/22/2020    EGD BIOPSY performed by Antonio Sinha MD at [address] Medications: No current facility-administered medications on file prior to encounter.      Current Outpatient Medications on File Prior to Encounter   Medication Sig Dispense Refill    pantoprazole (PROTONIX) 40 MG tablet Take 40 mg by mouth daily      mirtazapine (REMERON) 15 MG tablet Take 15 mg by mouth nightly      midodrine (PROAMATINE) 5 MG tablet Take 1 tablet by mouth as needed (HD: Give pre and mid tx for Sbp <100) 90 tablet 3    levothyroxine (SYNTHROID) 75 MCG tablet Take 75 mcg by mouth Daily      ARIPiprazole (ABILIFY) 10 MG tablet Take 10 mg by mouth daily      aspirin 81 MG EC tablet Take 81 mg by mouth daily      levETIRAcetam (KEPPRA) 500 MG tablet Take 500 mg by mouth 2 times daily      raloxifene (EVISTA) 60 MG tablet Take 60 mg by mouth daily      vitamin C (ASCORBIC ACID) 500 MG tablet Take 500 mg by mouth daily      sertraline (ZOLOFT) 50 MG tablet Take 50 mg by mouth daily      Probiotic Product (ACIDOPHILUS PROBIOTIC BLEND PO) Take 1 tablet by mouth daily      potassium chloride (KLOR-CON) 10 MEQ extended release tablet Take 10 mEq by mouth daily Monday Wednesday Friday dialysis days only      bismuth subsalicylate (PEPTO BISMOL) 525 MG/15ML suspension Take 30 mLs by mouth every 8 hours as needed for Indigestion      docusate sodium (COLACE) 100 MG capsule Take 100 mg by mouth daily as needed       senna (SENOKOT) 8.6 MG tablet Take 1 tablet by mouth daily as needed for Constipation       pravastatin (PRAVACHOL) 40 MG tablet Take 40 mg by mouth daily      acetaminophen (TYLENOL) 500 MG tablet Take 500 mg by mouth every 4 hours as needed for Pain      ondansetron (ZOFRAN) 4 MG tablet Take 4 mg by mouth every 6 hours as needed for Nausea or Vomiting         Allergies:  Penicillins    Social History:    Social History     Socioeconomic History    Marital status:      Spouse name: Not on file    Number of children: Not on file    Years of education: Not on file    Highest education level: Not on file   Occupational History    Not on file   Tobacco Use    Smoking status: Never Smoker    Smokeless tobacco: Never Used   Vaping Use    Vaping Use: Never used   Substance and Sexual Activity    Alcohol use: Not Currently    Drug use: Not Currently    Sexual activity: Not Currently   Other Topics Concern    Not on file   Social History Narrative    Not on file     Social Determinants of Health     Financial Resource Strain:     Difficulty of Paying Living Expenses:    Food Insecurity:     Worried About Running Out of Food in the Last Year:     Ran Out of Food in the Last Year:    Transportation Needs:     Lack of Transportation (Medical):  Lack of Transportation (Non-Medical):    Physical Activity:     Days of Exercise per Week:     Minutes of Exercise per Session:    Stress:     Feeling of Stress :    Social Connections:     Frequency of Communication with Friends and Family:     Frequency of Social Gatherings with Friends and Family:     Attends [employer] Services:     Active Member of Clubs or Organizations:     Attends Club or Organization Meetings:     Marital Status:    Intimate Partner Violence:     Fear of Current or Ex-Partner:     Emotionally Abused:     Physically Abused:     Sexually Abused:        Family History:   History reviewed. No pertinent family history. Review of Systems:   Pertinent positives stated above in HPI. All other 10 systems were reviewed and were negative.      Physical exam:   Constitutional:  VITALS:  BP 95/64   Pulse 64   Temp 97.6 °F (36.4 °C) (Oral)   Resp 15   Ht 4' 8\" (1.422 m)   Wt 78 lb (35.4 kg)   SpO2 100%   BMI 17.49 kg/m²   Gen: alert, awake, nad  HEENT: pupils reactive  Neck: no bruits or jvd noted  Cardiovascular:  S1, S2 without m/r/g; no lower extremity edema  Respiratory: CTA B without w/r/r; respiratory effort normal  Abdomen:  +bs, soft, nt, nd, no hepatosplenomegaly  Neuro/Psy: AAoriented times 2 ; moves all 4 ext    Data/  Recent Labs     08/17/21  1500 08/18/21  0556   WBC 7.4 6.2   HGB 11.3* 10.7*   HCT 34.7* 32.9*   .7* 101.3*    163     Recent Labs     08/17/21  1500      K 4.0      CO2 23   GLUCOSE 85   BUN 36*   CREATININE 4.7*   LABGLOM 9*   GFRAA 11*       Assessment  -ESRD on HD MWF at [facility]  -Chest pain , musculoskeletal per IM  -anemia  -HTN  -CKD/MBD    Plan  -HD on 8/18 per schedule  -renal dose meds, JOSE GUADALUPE as per OP orders  -ok to dc from renal std pt    Thank you for the consultation. Please do not hesitate to call with questions. Kalpana Linares MD  Office: [phone]  Fax:    [phone] [address] [facility]

## 2021-08-18 NOTE — PLAN OF CARE
Problem: Falls - Risk of:  Goal: Will remain free from falls  Description: Will remain free from falls  Outcome: Ongoing  Goal: Absence of physical injury  Description: Absence of physical injury  Outcome: Ongoing     Problem: SAFETY  Goal: Free from accidental physical injury  Outcome: Ongoing  Goal: Free from intentional harm  Outcome: Ongoing

## 2021-08-18 NOTE — CARE COORDINATION
INTERDISCIPLINARY PLAN OF CARE CONFERENCE    Date/Time: 8/18/2021 11:24 AM  Completed by: Steph Guajardo RN, Case Management      Patient Name:  Seymour Hallman  YOB: 1950  Admitting Diagnosis: Chest pain [R07.9]  Chest pain, unspecified type [R07.9]     Admit Date/Time:  8/17/2021  2:12 PM    Chart reviewed. Interdisciplinary team contacted or reviewed plan related to patient progress and discharge plans. Disciplines included Case Management, Nursing, and Dietitian. Discharge Plan Comments: Pt is from [place] [address]. Pt missed her HD chair time and they are unable to see pt today for HD. Pt's RN, MaryA nne Pinto, informed  that she spoke with [facility] and they were unable to take pt today for HD.  called Rosie Mark with [employer] and she is trying to figure out a way to have pt seen, as CM has informed her that pt has missed her chair time and cannot go to facility without HD and cannot have another chair time. Reshma spoke with Dr. Nabila Bruner regarding this issue, as well. Addendum [phone] Quezada returned call to [address] and stated that she spoke with Dr. Nabila Bruner and informed CM that in 2 hours they will run pt for 2 1/2 hours and pt will be completed by 6pm, and therefore pt will be ready for her 1830-1845pm  time. Mary Anne Pinto, RN is aware.      For d/c info please RESHMA note from [facility]

## 2021-08-18 NOTE — DISCHARGE SUMMARY
Name:  Asaf Bhardwaj  Room:  /1453-59  MRN:    [id number]    Discharge Summary      This discharge summary is in conjunction with a complete physical exam done on the day of discharge. Attending Physician: Dr. Jeffery Zheng  Discharging Physician: Dr. Brock Machadoam: 8/17/2021  Discharge:   8/18/2021    HPI:  The patient is a 80 y.o. female with ESRD on HD,  HLD, sp pacemaker, dementia, resides at [facility] who presented to [facility] ED with complaint of chest pain. Patient describes pain as a dull aching sensation on the right side of her chest.  She states it started this morning. She does not recall what she was doing when the pain started. She states the pain does not radiate. It is not associated with any symptoms. She denies dyspnea, diaphoresis, dizziness, nausea, vomiting. She denies a history of pain like this in the past.  She states \"I think I bruised it. \". She denies any trauma. She is a poor historian, she has dementia.     ER work-up revealed an afebrile patient. Stable vital signs. EKG has an atrial paced rhythm. Troponin elevated 0.06, it was elevated to 0.13 in October 2020 when she was admitted for syncopal episode. Chest x-ray was negative for acute findings. She will be admitted for further care and evaluation of chest pain. Diagnoses this Admission and Hospital Course   #Chest pain- right sided   - unclear etiology, she is a poor historian. Likely musculoskeletal  - EKG atrial paced  - initial trop 0.06, suspect elevation partly related to ESRD.  Will trend. Flat.   Levels better than her baseline   - CXR unrevealing   Ibuprofen prn  Apply ice   Consider bone scan as outpatient if pain persists      #pacemaker in place  - limited past records in place to review       #ESRD on HD  - MWF  - c/s nephro     #Hypothyroidism  - continued synthroid     #History of seizures   - continued Keppra      DVT Prophylaxis: Lovenox       Procedures (Please Review Full Report for Details)  N/A     Consults    N/A      Physical Exam at Discharge:    BP (!) 94/59   Pulse 68   Temp 97.5 °F (36.4 °C) (Oral)   Resp 16   Ht 4' 8\" (1.422 m)   Wt 78 lb (35.4 kg)   SpO2 100%   BMI 17.49 kg/m²     See today's progress note    CBC: Recent Labs     08/17/21  1500 08/18/21  0556   WBC 7.4 6.2   HGB 11.3* 10.7*   HCT 34.7* 32.9*   .7* 101.3*    163     BMP:   Recent Labs     08/17/21  1500      K 4.0      CO2 23   BUN 36*   CREATININE 4.7*     LIVER PROFILE:   Recent Labs     08/17/21  1500   AST 23   ALT 17   BILITOT 0.4   ALKPHOS 424*     PT/INR:   Recent Labs     08/17/21  1500   PROTIME 10.9   INR 0.97       CARDIAC ENZYMES  Recent Labs     08/17/21  1500 08/17/21  1949 08/17/21  2226   TROPONINI 0.06* 0.03* 0.05*       CULTURES  N/A    RADIOLOGY  XR CHEST PORTABLE   Final Result   No airspace disease by radiograph.                Discharge Medications     Medication List      CONTINUE taking these medications    acetaminophen 500 MG tablet  Commonly known as: TYLENOL     ACIDOPHILUS PROBIOTIC BLEND PO     ARIPiprazole 10 MG tablet  Commonly known as: ABILIFY     aspirin 81 MG EC tablet     bismuth subsalicylate 663 ID/80CB suspension  Commonly known as: PEPTO BISMOL     docusate sodium 100 MG capsule  Commonly known as: COLACE     levETIRAcetam 500 MG tablet  Commonly known as: KEPPRA     levothyroxine 75 MCG tablet  Commonly known as: SYNTHROID     midodrine 5 MG tablet  Commonly known as: PROAMATINE  Take 1 tablet by mouth as needed (HD: Give pre and mid tx for Sbp <100)     mirtazapine 15 MG tablet  Commonly known as: REMERON     ondansetron 4 MG tablet  Commonly known as: ZOFRAN     pantoprazole 40 MG tablet  Commonly known as: PROTONIX     potassium chloride 10 MEQ extended release tablet  Commonly known as: KLOR-CON     pravastatin 40 MG tablet  Commonly known as: PRAVACHOL     raloxifene 60 MG tablet  Commonly known as: EVISTA     senna 8.6 MG tablet  Commonly known as: SENOKOT     sertraline 50 MG tablet  Commonly known as: ZOLOFT     vitamin C 500 MG tablet  Commonly known as: ASCORBIC ACID              Discharged in stable condition to home. Follow Up: Follow up with PCP. MINERVA Chirinos.

## 2021-08-18 NOTE — PROGRESS NOTES
Patient admitted to room 306 from ED. Patient oriented to room, call light, bed rails, phone, lights and bathroom. Patient instructed about the schedule of the day including: vital sign frequency, lab draws, possible tests, frequency of MD and staff rounds, daily weights, I &O's and prescribed diet. bed alarm in place, patient aware of placement and reason. Telemetry box in place, patient aware of placement and reason. Bed locked, in lowest position, side rails up 2/4, call light within reach. Recliner Assessment  Patient is not able to demonstrated the ability to move from a reclining position to an upright position within the recliner. however patient is alert, oriented and able to provide informed consent    4 Eyes Skin Assessment     The patient is being assess for   Admission    I agree that 2 RN's have performed a thorough Head to Toe Skin Assessment on the patient. ALL assessment sites listed below have been assessed. Areas assessed for pressure by both nurses:   [x]   Head, Face, and Ears   [x]   Shoulders, Back, and Chest, Abdomen  [x]   Arms, Elbows, and Hands   [x]   Coccyx, Sacrum, and Ischium  [x]   Legs, Feet, and Heels  Scattered bruises and scratches noted        Skin Assessed Under all Medical Devices by both nurses:  N/A              All Mepilex Borders were peeled back and area peeked at by both nurses:  No: N/A  Please list where Mepilex Borders are located:              **SHARE this note so that the co-signing nurse is able to place an eSignature**    Co-signer eSignature: Electronically signed by Citlali Mayer RN on 8/18/21 at 1:30 AM EDT    Does the Patient have Skin Breakdown related to pressure?   No     (Insert Photo here)         Brent Prevention initiated:  YES   Wound Care Orders initiated:  NO      [facility] nurse consulted for Pressure Injury (Stage 3,4, Unstageable, DTI, NWPT, Complex wounds)and New or Established Ostomies:  NO      Primary Nurse eSignature: Electronically signed by Irma Austin RN on 8/17/21 at 10:34 PM EDT

## 2021-08-18 NOTE — CARE COORDINATION
DISCHARGE ORDER  Date/Time 2021 9:58 AM  Completed by: COREY Barbosa. Case Management    Patient Name: Oanh Rodriguez    : 1950      Admit order Date and Status: 2021 Observation   Noted discharge order. (verify MD's last order for status of admission/Traditional Medicare 3 [place] Inpatient qualifying stay required for SNF)    Confirmed discharge plan with:              Patient:  Yes              When pt confirms DC plan does any support person need to be contacted by CM Yes if yes who pt's sister [place] at [phone]                Discharge to Facility: [facility] phone number for staff giving report:  [phone]   Pre-certification completed: n/a returning long term care   Hospital Exemption Notification (HENS) completed: n/a returning long term care    Discharge orders and Continuity of Care faxed to facility:  yes to [facility] at [phone] per Frandy's request      Transportation:               Medical Transport explained with choice list offered to pt/family. Choice:Yes(no preference)  Agency used: [address] [place] [address] ([phone])   time:   6:30-6:45pm      Pt/family/Nursing/Facility aware of  time:  Yes Names: pt, pt's sister [place] via phone call, Prasanna Powell, Davi Belle on PCU, Cheryle Brave at [facility] form completed:  yes      Comments: Chart review completed. Met with pt this AM who stated she plans on returning to [facility] today. She denied needing or wanting a list of transportation companies, stating first available. She is aware she will need HD here prior to discharge and she stated agreement. She stated she thinks she goes to HD in [facility]. Pt is updated on the pickup time and requested writer call her sister. She denied further needs from CM regarding her discharge today.     Called and spoke with Dang at [employer] ([phone]) who stated pt actually goes to their [facility] location and would notify them of pt's return on Friday as she also works at the [facility] location. She stated pt arrives at 10:45am with a chair time of 11am for a Monday, Wednesday, Friday slot. She stated that pt missed her outpatient HD slot today. Dang stated she needed nothing from writer. Mary Lozano at [facility] aware of the above and requested a rapid covid. Mary Lozano stated pt can return today. Called and spoke with pt's sister Memo Duran who was updated on the above. Transferred her to pt's RN to get update. Mendel Hall RN aware of the need for a rapid covid    Pt is being d/c'd to [facility] today. Pt's O2 sats are 100% on Room Air per vitals in epic. Discharge timeout done with [facility]. All discharge needs and concerns addressed. Discharging nurse to complete EDIE, reconcile AVS, and place final copy with patient's discharge packet. Discharging RN to ensure that written prescriptions for Level II medications are sent with patient to the facility as per protocol.

## 2021-08-18 NOTE — PLAN OF CARE
Problem: Falls - Risk of:  Goal: Will remain free from falls  Description: Will remain free from falls  Outcome: Ongoing  Goal: Absence of physical injury  Description: Absence of physical injury  Outcome: Ongoing     Problem: SAFETY  Goal: Free from accidental physical injury  Outcome: Ongoing  Goal: Free from intentional harm  Outcome: Ongoing     Problem: DAILY CARE  Goal: Daily care needs are met  Outcome: Ongoing     Problem: PAIN  Goal: Patient's pain/discomfort is manageable  Outcome: Ongoing     Problem: SKIN INTEGRITY  Goal: Skin integrity is maintained or improved  Outcome: Ongoing     Problem: KNOWLEDGE DEFICIT  Goal: Patient/S.O. demonstrates understanding of disease process, treatment plan, medications, and discharge instructions.   Outcome: Ongoing     Problem: DISCHARGE BARRIERS  Goal: Patient's continuum of care needs are met  Outcome: Ongoing     Problem: Skin Integrity:  Goal: Will show no infection signs and symptoms  Description: Will show no infection signs and symptoms  Outcome: Ongoing  Goal: Absence of new skin breakdown  Description: Absence of new skin breakdown  Outcome: Ongoing

## 2021-09-30 NOTE — ED PROVIDER NOTES
[address] ED      CHIEF COMPLAINT  syncope     HISTORY OF PRESENT ILLNESS  Ana Maria Starks is a 80 y.o. female with a past medical history of seizures, hypothyroidism, GI bleed, hypertension, end-stage renal disease, ischemic steal syndrome, previous syncope and collapse, and pacemaker, who presents to the ED complaining of syncopal event. Patient reports that she got lightheaded and passed out. Occurred while the patient was in a wheelchair. Was not getting up. Reports she has had previous episode in the past-in the past, providers felt syncope was related hypotension. Unsure of last episode prior to today. Patient feels at baseline now. Denies associated shortness of breath or chest pain. Short duration. Was in the wheelchair, no fall or trauma. Spoke to nurse Jolanta Finney at [facility]. She states that the patient was found unconscious in her wheelchair. No seizure-like activity noted. He states that patient was unconscious for approximately 3 to 4 minutes. Patient did not have any post ictal symptoms. No trauma. Nursing stated they were concerned because her blood pressure was higher than her baseline with systolic at 619. They state that she typically lives 90s/60s. They do report that she had some hypotension with dialysis last week. Her last dialysis was yesterday and was a normal run. Denies any fever or other concerns at this time. They do see that she has a history of syncope in their system. Denies history of blood clots or active malignancy. Patient denies unilateral leg swelling, hemoptysis, recent travel or surgery/immobilization, or OCP or other hormone use. Patient does not smoke. Old records reviewed: Patient admitted 8/17 for chest pain, suspected musculoskeletal.  She did have Trope elevation that they suspected was related to end-stage renal disease, remained stable.      Echo 10/2020  Summary   Left ventricular systolic function is normal with ejection fraction estimated at 55-65 %. No regional wall motion abnormalities are noted. Left ventricular size is decreased. Grade I diastolic dysfunction with normal filling pressure. Mild tricuspid regurgitation. Normal systolic pulmonary artery pressure (SPAP) estimated at 25 mmHg (RA pressure 3 mmHg). No other complaints, modifying factors or associated symptoms. I have reviewed the following from the nursing documentation. Past Medical History:   Diagnosis Date    Acute kidney failure (HCC)     Anemia     Bradycardia     Cognitive communication deficit     Convulsions ([address]     Depressive disorder     ESRD (end stage renal disease) ([address]     Hemodialysis patient ([address]     Hx of blood clots     dvt lower legs    Hyperlipidemia     Hypertension     Muscle wasting     Osteomyelitis of lumbar spine ([address]     Pacemaker     biotronic verified 10/9/2020    Rhinitis, allergic     Seizure ([address]     Sepsis ([address]     Thyroid disease      Past Surgical History:   Procedure Laterality Date    COLONOSCOPY N/A 7/22/2020    COLONOSCOPY WITH BIOPSY performed by Matilda Miller MD at [address] Left 9/17/2020    LEFT UPPER EXTREMITY DIALYSIS GRAFT performed by Orlin Gomez MD at [address] [facility] [address] IR NONTUNNELED VASCULAR CATHETER  7/24/2020    IR NONTUNNELED VASCULAR CATHETER 7/24/2020 [facility] PROC[facility]    IR TUNNELED CATHETER PLACEMENT GREATER THAN 5 YEARS  7/28/2020    IR TUNNELED CATHETER PLACEMENT GREATER THAN 5 YEARS 7/28/2020 [facility]    PACEMAKER PLACEMENT      [place] THROMBECTOMY,AV FISTULA DIALYS GRFT Left 9/21/2020    THROMBECTOMY, ANGIOGRAM AND REVISION USING DISTAL INFLOW OF DIALYSIS GRAFT performed by Orlin Gomez MD at [address] N/A 7/22/2020    EGD BIOPSY performed by Matilda Miller MD at [address] [facility]     History reviewed. No pertinent family history.   Social History Socioeconomic History    Marital status:      Spouse name: Not on file    Number of children: Not on file    Years of education: Not on file    Highest education level: Not on file   Occupational History    Not on file   Tobacco Use    Smoking status: Never Smoker    Smokeless tobacco: Never Used   Vaping Use    Vaping Use: Never used   Substance and Sexual Activity    Alcohol use: Not Currently    Drug use: Not Currently    Sexual activity: Not Currently   Other Topics Concern    Not on file   Social History Narrative    Not on file     Social Determinants of Health     Financial Resource Strain:     Difficulty of Paying Living Expenses:    Food Insecurity:     Worried About Running Out of Food in the Last Year:     [address] in the Last Year:    Transportation Needs:     Lack of Transportation (Medical):  Lack of Transportation (Non-Medical):    Physical Activity:     Days of Exercise per Week:     Minutes of Exercise per Session:    Stress:     Feeling of Stress :    Social Connections:     Frequency of Communication with Friends and Family:     Frequency of Social Gatherings with Friends and Family:     Attends [employer] Services:     Active Member of Clubs or Organizations:     Attends Club or Organization Meetings:     Marital Status:    Intimate Partner Violence:     Fear of Current or Ex-Partner:     Emotionally Abused:     Physically Abused:     Sexually Abused:      No current facility-administered medications for this encounter.      Current Outpatient Medications   Medication Sig Dispense Refill    pantoprazole (PROTONIX) 40 MG tablet Take 40 mg by mouth daily      Probiotic Product (ACIDOPHILUS PROBIOTIC BLEND PO) Take 1 tablet by mouth daily      mirtazapine (REMERON) 15 MG tablet Take 15 mg by mouth nightly      potassium chloride (KLOR-CON) 10 MEQ extended release tablet Take 10 mEq by mouth daily Monday Wednesday Friday dialysis days only     Violette midodrine (PROAMATINE) 5 MG tablet Take 1 tablet by mouth as needed (HD: Give pre and mid tx for Sbp <100) 90 tablet 3    bismuth subsalicylate (PEPTO BISMOL) 525 MG/15ML suspension Take 30 mLs by mouth every 8 hours as needed for Indigestion      levothyroxine (SYNTHROID) 75 MCG tablet Take 75 mcg by mouth Daily      ARIPiprazole (ABILIFY) 10 MG tablet Take 10 mg by mouth daily      aspirin 81 MG EC tablet Take 81 mg by mouth daily      docusate sodium (COLACE) 100 MG capsule Take 100 mg by mouth daily as needed       levETIRAcetam (KEPPRA) 500 MG tablet Take 500 mg by mouth 2 times daily      raloxifene (EVISTA) 60 MG tablet Take 60 mg by mouth daily      senna (SENOKOT) 8.6 MG tablet Take 1 tablet by mouth daily as needed for Constipation       vitamin C (ASCORBIC ACID) 500 MG tablet Take 500 mg by mouth daily      sertraline (ZOLOFT) 50 MG tablet Take 50 mg by mouth daily      pravastatin (PRAVACHOL) 40 MG tablet Take 40 mg by mouth daily      acetaminophen (TYLENOL) 500 MG tablet Take 500 mg by mouth every 4 hours as needed for Pain      ondansetron (ZOFRAN) 4 MG tablet Take 4 mg by mouth every 6 hours as needed for Nausea or Vomiting       Allergies   Allergen Reactions    Penicillins Rash       REVIEW OF SYSTEMS  All systems reviewed, pertinent positives per HPI otherwise noted to be negative. PHYSICAL EXAM  BP (!) 90/56   Pulse 79   Temp 98.4 °F (36.9 °C) (Tympanic)   Resp 13   SpO2 97%    GENERAL APPEARANCE: Awake and alert. Cooperative. no distress. HENT: Normocephalic. Atraumatic. Mucous membranes are dry. No septal hematoma, hemotympanum, or blood in the oropharynx. NECK: Supple. Full range of motion of the neck without stiffness or pain. No C-spine tenderness to palpation. EYES: PERRL. EOM's grossly intact. HEART/CHEST: RRR. No murmurs. Chest wall is not tender to palpation. LUNGS: Respirations unlabored. CTAB. Good air exchange. Speaking comfortably in full sentences. ABDOMEN: No tenderness. Soft. Non-distended. No masses. No organomegaly. No guarding or rebound. MUSCULOSKELETAL: No extremity edema. Compartments soft. No deformity. No tenderness in the extremities. All extremities neurovascularly intact. SKIN: Warm and dry. No acute rashes. NEUROLOGICAL: Alert and oriented. No gross facial drooping. Strength 5/5, sensation intact. No ataxia. NIH SS of 0  PSYCHIATRIC: Normal mood and affect. LABS  I have reviewed all labs for this visit.    Results for orders placed or performed during the hospital encounter of 09/30/21   CBC Auto Differential   Result Value Ref Range    WBC 7.9 4.0 - 11.0 K/uL    RBC 3.66 (L) 4.00 - 5.20 M/uL    Hemoglobin 11.6 (L) 12.0 - 16.0 g/dL    Hematocrit 37.2 36.0 - 48.0 %    .7 (H) 80.0 - 100.0 fL    MCH 31.8 26.0 - 34.0 pg    MCHC 31.3 31.0 - 36.0 g/dL    RDW 18.0 (H) 12.4 - 15.4 %    Platelets 154 180 - 025 K/uL    MPV 8.8 5.0 - 10.5 fL    PLATELET SLIDE REVIEW Adequate     SLIDE REVIEW see below     Neutrophils % 68.0 %    Lymphocytes % 13.0 %    Monocytes % 12.0 %    Eosinophils % 1.0 %    Basophils % 0.0 %    Neutrophils Absolute 5.6 1.7 - 7.7 K/uL    Lymphocytes Absolute 1.3 1.0 - 5.1 K/uL    Monocytes Absolute 0.9 0.0 - 1.3 K/uL    Eosinophils Absolute 0.1 0.0 - 0.6 K/uL    Basophils Absolute 0.0 0.0 - 0.2 K/uL    Atypical Lymphocytes Relative 3 0 - 6 %    Myelocyte Percent 3 (A) %    Toxic Granulation Present (A)     Smudge Cells Present (A)     Anisocytosis 1+ (A)     Polychromasia Occasional (A)     Poikilocytes Occasional (A)    Comprehensive Metabolic Panel w/ Reflex to MG   Result Value Ref Range    Sodium 142 136 - 145 mmol/L    Potassium reflex Magnesium 4.4 3.5 - 5.1 mmol/L    Chloride 105 99 - 110 mmol/L    CO2 24 21 - 32 mmol/L    Anion Gap 13 3 - 16    Glucose 118 (H) 70 - 99 mg/dL    BUN 40 (H) 7 - 20 mg/dL    CREATININE 4.7 (H) 0.6 - 1.2 mg/dL    GFR Non-African American 9 (A) >60    GFR  11 (A) >60    Calcium 8.4 8.3 - 10.6 mg/dL    Total Protein 5.0 (L) 6.4 - 8.2 g/dL    Albumin 2.6 (L) 3.4 - 5.0 g/dL    Albumin/Globulin Ratio 1.1 1.1 - 2.2    Total Bilirubin 0.5 0.0 - 1.0 mg/dL    Alkaline Phosphatase 389 (H) 40 - 129 U/L    ALT 14 10 - 40 U/L    AST 25 15 - 37 U/L    Globulin 2.4 g/dL   Troponin   Result Value Ref Range    Troponin 0.05 (H) <0.01 ng/mL   Troponin   Result Value Ref Range    Troponin 0.06 (H) <0.01 ng/mL   POCT Glucose   Result Value Ref Range    QC OK? 118    EKG 12 Lead   Result Value Ref Range    Ventricular Rate 72 BPM    Atrial Rate 72 BPM    P-R Interval 202 ms    QRS Duration 68 ms    Q-T Interval 386 ms    QTc Calculation (Bazett) 422 ms    P Axis 6 degrees    R Axis -27 degrees    T Axis 35 degrees    Diagnosis       Normal sinus rhythmLow voltage QRSNonspecific ST abnormalityWhen compared with ECG of 18-AUG-2021 06:35,No significant change was foundConfirmed by KAPIL Moody MD ([id number]) on 9/30/2021 5:42:10 PM   EKG 12 Lead   Result Value Ref Range    Ventricular Rate 81 BPM    Atrial Rate 81 BPM    P-R Interval 196 ms    QRS Duration 68 ms    Q-T Interval 380 ms    QTc Calculation (Bazett) 441 ms    R Axis 21 degrees    T Axis 31 degrees    Diagnosis       Unusual P axis, possible ectopic atrial rhythmLow voltage QRSNonspecific ST abnormalityWhen compared with ECG of 30-SEP-2021 15:39,No significant change was foundConfirmed by KAPIL Moody MD ([id number]) on 10/1/2021 7:56:57 AM       ECG  The Ekg interpreted by me shows  normal sinus rhythm with a rate of 72  Axis is   Left axis deviation  QTc is  within an acceptable range  Intervals and Durations are unremarkable. ST Segments: no acute change  No significant change from prior EKG dated 8/18/21    The repeat Ekg interpreted by me shows  normal sinus rhythm with a rate of 81  Axis is   Normal  QTc is  within an acceptable range  Intervals and Durations are unremarkable.       ST Segments: nonspecific changes  No significant change from prior EKG dated 9/30/21    RADIOLOGY    No orders to display          ED COURSE / MDM  Patient seen and evaluated. Old records reviewed and pertinent information included in HPI. Labs and imaging reviewed and results discussed with patient. Overall well appearing patient, in no acute distress, presenting for episode of syncope. Patient has a history of syncope. Physical exam unremarkable. Differential diagnosis includes but is not limited to: Intermittent hypotension, vagal episode, arrhythmia, hypoglycemia low suspicion for ACS, PE, intracranial hemorrhage, seizure    EKG and laboratory studies obtained. Workup showed: At this time I have low concern for pulmonary embolism. Patient has not had a previous blood clot. Patient denies other risk factors for pulmonary embolism. Patient does not have any evidence of DVT on exam.  Patient is low risk on PERC and Wells criteria. At this time, considering that risks associated with further work-up for pulmonary embolism outweigh the likelihood of this diagnosis. Low suspicion for aortic pathology. Patient is not hypertensive. Patient denies chest pain. Patient has strong pulses in the bilateral radial and femoral arteries. Patient does not have any neurologic deficits. The evidence indicates that the patient is very low risk for Aortic Dissection and this is consistent with my clinical intuition. The risk of further workup or hospitalization for aortic dissection is likely higher than the risk of the patient having an aortic dissection. ED Course as of Oct 01 2129   Thu Sep 30, 2021   1549 Orthostatic vital signs are not positive. [ER]   3511 The Ekg interpreted by me shows  normal sinus rhythm with a rate of 72  Axis is   Left axis deviation  QTc is  within an acceptable range  Intervals and Durations are unremarkable.       ST Segments: no acute change  No significant change from prior EKG dated 8/18/21    [ER]   1611 Mild anemia at 11.6. No leukocytosis or thrombocytopenia. Anemia improved from previous    [ER]   1618 No electrolyte abnormalities. Patient is not hypoglycemic.    [ER]   1619 Creatinine is elevated at 4.7. This is consistent with recent baseline. Patient did have dialysis yesterday. [ER]   2996 Downtrending elevation of alkaline phosphatase, there is some evidence of malnutrition. No other abnormalities on liver function testing.    [ER]   1619 Troponin is elevated at 0.05, consistent with previous baseline during previous hospitalization. EKG showed no evidence of acute ischemia. Patient denies chest pain. Suspect likely related to patient's chronic kidney disease.    [ER]   1901 The repeat Ekg interpreted by me shows  normal sinus rhythm with a rate of 81  Axis is   Normal  QTc is  within an acceptable range  Intervals and Durations are unremarkable. ST Segments: nonspecific changes  No significant change from prior EKG dated 9/30/21    [ER]   1922 Delta troponin mildly up trended to 0.06. Repeat EKG unchanged. Patient continues to remain asymptomatic in the emergency department. [ER]   2017 Spoke to Griselda Fan of cardiology regarding mildly elevated troponin that was stable to mild uptrend. We discussed the patient's presentation and recent admission for chest pain. He felt that given that troponin is within baseline range and patient is denying any symptoms feel that she can be discharged home at this time. He did recommend follow-up with cardiology. [ER]      ED Course User Index  [ER] Wen Alarcon MD        During the patient's ED course, the patient was given:  Medications   0.9 % sodium chloride bolus (0 mLs IntraVENous Stopped 9/30/21 1710)      Work-up overall reassuring. Patient is at baseline. Patient's blood pressure is at baseline. Nursing home did not raise any other concerns except for syncopal event.   Patient has had similar syncopal events in the past.  She denies any chest pain or shortness of breath. Troponin is elevated, suspect related to kidney disease, stable from previous. At this time, feel the patient is appropriate for discharge to follow-up with a primary care doctor. Patient feels comfortable with discharge at this time. Strict return precautions given. Encouraged PCP follow-up in 1 day. Patient discharged in stable condition. I estimate there is LOW risk for ACUTE CORONARY SYNDROME, PULMONARY EMBOLISM, PNEUMOTHORAX, RUPTURED ESOPHAGUS OR THORACIC AORTIC DISSECTION, thus I consider the discharge disposition reasonable. Brian Nixon and I have discussed the diagnosis and risks, and we agree with discharging home with close follow-up. We also discussed returning to the Emergency Department immediately if new or worsening symptoms occur. We have discussed the symptoms which are most concerning that necessitate immediate return. CLINICAL IMPRESSION  1. Syncope and collapse    2. ESRD (end stage renal disease) (HCC)    3. Elevated troponin        Blood pressure 126/66, pulse 74, temperature 98.4 °F (36.9 °C), temperature source Tympanic, resp. rate 18, SpO2 100 %. Christopher Buck was discharged to Nursing facility in stable condition. Patient was given scripts for the following medications. I counseled patient how to take these medications. Discharge Medication List as of 9/30/2021 10:07 PM          Follow-up with:  Shilpa Ho MD  [address] [facility] [address]  [phone]    Call in 1 day      Adrian Lee MD  [address] [place] [address]. [facility]  [phone]    Schedule an appointment as soon as possible for a visit       [facility]  [address] [address]  Go to   As needed, If symptoms worsen      DISCLAIMER: This chart was created using Dragon dictation software.   Efforts were made by me to ensure accuracy, however some errors may be present due to limitations of this technology and occasionally words are not transcribed correctly.               Jaswinder Geiger MD  10/01/21 212

## 2021-09-30 NOTE — ED NOTES
Patient is awake and alert. Speaking clearly in complete sentences. Respirations unlabored. No noted distress.       Hebert Valerio RN  09/30/21 1930

## 2021-10-21 PROBLEM — E86.1 HYPOTENSION DUE TO HYPOVOLEMIA: Status: ACTIVE | Noted: 2021-01-01

## 2021-10-21 PROBLEM — I95.89 HYPOTENSION DUE TO HYPOVOLEMIA: Status: ACTIVE | Noted: 2021-01-01

## 2021-10-21 NOTE — PATIENT INSTRUCTIONS
Your provider has ordered testing for further evaluation. An order/prescription has been included in your paper work.  To schedule outpatient testing, contact Central Scheduling by calling [employer] ([phone]). Plan:  1. Orthostatic Blood Pressure ~ assess syncope episode in correlation to possible orthostatic hypotension.  ~ in office today  2. Echocardiogram ~ evaluate heart strength and structure, syncopal episode  ~A test that records movement of your heart valves and chambers by ultrasound. Evaluates heart valves, any chamber enlargement, abnormal openings, or any fluid in the sac surrounding the heart. 3. Device Check ~ pacemaker unknown pacemaker device   4. Referral to Electrophysiology ~ pacemaker device   5. Lipid Panel Reviewed ~ no statin therapy indicated   6.  Follow up as needed pending echocardiogram.

## 2021-10-21 NOTE — LETTER
CARDIOLOGY CONSULTATION        Patient Name: Metro Section  Primary Care physician: Scott Velasco MD    Reason for Referral/Chief Complaint: Arlen Galindo is a 80 y.o. patient who is referred to cardiology clinic today for evaluation and treatment of elavated troponin and syncope. History of Present Illness:   Metro Section 80 y.o. female is here today as a new patient for cardiology evaluation. She was referred by Emergency Room for findings of elevated troponin and syncope. Curlene Mcardle is a resident at [facility] [address] [place] [address]. She has a medical history including seizures, hypothyroidism, end stage renal disease requiring hemodialysis Monday/Wednesday/Friday, hypertension, and is status post permanent pacemaker of unknown type/unknown indication. The patient was seen in the ED with complaints of syncope on 09/30/21. Troponin was elevated . 05, .06. Noted elevated prior admission. Her EKG was notable for normal sinus rhythm with non-specific ST changes with no specific changes found in comparison to 08/18/21 EKG. Her last echocardiogram from 10/10/20 showed EF 55-65%, Grade I DD, and mild tricuspid regurgitation. Today she presents in wheelchair from nursing home, no caregiver available to corroborate history. She states she is having intermittent right sided \"sharp\" chest pain. Occurs at rest as she is not greatly active. She states twisting motion of her thorax and sitting up will worsen pain. No worse with deep breathing. She states she can walk short distances indoors and does not experience chest pain with this activity. It hurts worse with pressing on the catheter. Patient has noted subclavian dialysis CVC to Right upper chest w/ dry dressing in place. She points to her dialysis catheter site of her discomfort. She is having it presently. Patient declines fevers or chills. No redness or rash over the site. No expressible drainage.      Noted patient passed out leading to ER visit 9/30/2021. She does not have much recollection of the event. It is reported that she was found in her wheelchair unconscious and was out for 3 to 4 minutes per discussion between ED provider and nurse at facility. No seizure-like activity documented. Patient states today that she had a subsequent episode yesterday. Last dialysis session was yesterday. Unsure how much fluid was removed. She denies loss of bowel or bladder continence with episodes. Reports she does have a history of seizures. She was not evaluated in the emergency department for episode that occurred yesterday. She states she does not have a good appetite at facility as she does not care for the food. Denies shortness of breath at rest and dyspnea with exertion. Denies palpitations. Denies paroxysmal nocturnal dyspnea, orthopnea, bendopnea, increasing lower extremity edema or weight gain. She is a patient of [facility], takes all medication as prescribed per staff facility documents. Medications were reviewed in full today with cardiac medications including baby aspirin daily as well as midodrine as needed on HD days. Past Medical History:   has a past medical history of Acute kidney failure ([address] Anemia, Bradycardia, Cognitive communication deficit, Convulsions ([address] Depressive disorder, ESRD (end stage renal disease) ([address] Hemodialysis patient ([address] Hx of blood clots, Hyperlipidemia, Hypertension, Muscle wasting, Osteomyelitis of lumbar spine ([address] Pacemaker, Rhinitis, allergic, Seizure ([address] Sepsis ([address] and Thyroid disease. Surgical History:   has a past surgical history that includes Upper gastrointestinal endoscopy (N/A, 7/22/2020); Colonoscopy (N/A, 7/22/2020); IR NONTUNNELED VASCULAR CATHETER > 5 YEARS (7/24/2020); IR TUNNELED CVC PLACE WO SQ PORT/PUMP > 5 YEARS (7/28/2020); Dialysis fistula creation (Left, 9/17/2020); pr thrombectomy,av fistula dialys grft (Left, 9/21/2020); and pacemaker placement. Provider, MD   sertraline (ZOLOFT) 25 MG tablet Take 50 mg by mouth daily    Yes Historical Provider, MD   acetaminophen (TYLENOL) 500 MG tablet Take 500 mg by mouth every 4 hours as needed for Pain   Yes Historical Provider, MD   ondansetron (ZOFRAN) 4 MG tablet Take 4 mg by mouth every 6 hours as needed for Nausea or Vomiting   Yes Historical Provider, MD   Probiotic Product (ACIDOPHILUS PROBIOTIC BLEND PO) Take 1 tablet by mouth daily    Historical Provider, MD   potassium chloride (KLOR-CON) 10 MEQ extended release tablet Take 10 mEq by mouth daily Monday Wednesday Friday dialysis days only  Patient not taking: Reported on 10/21/2021    Historical Provider, MD   docusate sodium (COLACE) 100 MG capsule Take 100 mg by mouth daily as needed     Historical Provider, MD   senna (SENOKOT) 8.6 MG tablet Take 1 tablet by mouth daily as needed for Constipation     Historical Provider, MD   pravastatin (PRAVACHOL) 40 MG tablet Take 40 mg by mouth daily    Historical Provider, MD        CURRENT Medications:  No current facility-administered medications for this visit. Allergies:  Penicillins     Review of Systems:   A 14 point review of symptoms completed. Pertinent positives identified in the HPI, all other review of symptoms negative as below. Objective:     Vitals:    10/21/21 0940 10/21/21 0946 10/21/21 1025   BP: (!) 88/52 (!) 65/50 (!) 78/58   Site:  Right Upper Arm Right Upper Arm   Position:  Standing Sitting   Pulse: 91 93 92   SpO2: 96% 97% 100%   Weight: 73 lb (33.1 kg)     Height: 4' 8\" (1.422 m)        Weight: 73 lb (33.1 kg)       PHYSICAL EXAM:    General:  Alert, cooperative, no distress, appears older than stated age   Head:  Normocephalic, atraumatic   Eyes:  Conjunctiva/corneas clear, anicteric sclerae    Nose: Nares normal, no drainage or sinus tenderness   Throat: No abnormalities of the lips, oral mucosa or tongue. Dry membranes. Neck: Trachea midline.  Neck supple with no lymphadenopathy, thyroid not enlarged, symmetric, no tenderness/mass/nodules, flat neck veins. Lungs:   Clear to auscultation bilaterally, no wheezes, no rales, no respiratory distress   Chest Wall:   Right chest central venous line/hemodialysis catheter with dressing, no expressible drainage or erythema. There is tenderness of the line on palpation and chest wall surrounding the line. Heart:  Regular rate and rhythm, normal S1, normal S2, no murmur, no rub, no S3/S4, PMI non-palpable. No RV heave. Abdomen:   Soft, non-tender, with normoactive bowel sounds. No masses, no hepatosplenomegaly   Extremities: No cyanosis, clubbing. Mild nonpitting edema. Vascular: 2+ radial, diminished dorsalis pedis and posterior tibial pulses bilaterally. Brisk carotid upstrokes without carotid bruit. Skin: Skin color, texture, turgor are normal with no rashes or ulceration. Pysch: Euthymic mood, appropriate affect   Neurologic: Oriented to person, place and time. No slurred speech or facial asymmetry. No motor or sensory deficits on gross examination.          Labs:   CBC:   Lab Results   Component Value Date    WBC 7.9 2021    RBC 3.66 2021    HGB 11.6 2021    HCT 37.2 2021    .7 2021    RDW 18.0 2021     2021     CMP:  Lab Results   Component Value Date     2021    K 4.4 2021     2021    CO2 24 2021    BUN 40 2021    CREATININE 4.7 2021    GFRAA 11 2021    AGRATIO 1.1 2021    LABGLOM 9 2021    GLUCOSE 118 2021    PROT 5.0 2021    CALCIUM 8.4 2021    BILITOT 0.5 2021    ALKPHOS 389 2021    AST 25 2021    ALT 14 2021     PT/INR:  No results found for: PTINR  HgBA1c:No results found for: LABA1C  Lab Results   Component Value Date    TROPONINI 0.06 (H) 2021         Cardiac Data:     EK21  Unusual P axis, possible ectopic atrial rhythmLow voltage QRS Nonspecific ST abnormality. When compared with ECG of 30-SEP-2021 15:39,No significant change was found     EKG 08/18/21  Normal sinus rhythmLeft axis deviationAbnormal ECGWhen compared with ECG of 17-AUG-2021 14:27,Sinus rhythm has replaced Electronic atrial pacemaker     Echo:10/10/20   Summary   Left ventricular systolic function is normal with ejection fraction   estimated at 55-65 %. No regional wall motion abnormalities are noted. Left ventricular size is decreased. Grade I diastolic dysfunction with normal filling pressure. Mild tricuspid regurgitation. Normal systolic pulmonary artery pressure (SPAP) estimated at 25 mmHg (RA   pressure 3 mmHg). Impression and Plan:      1. Noncardiac chest pain-suspect related to HD line. No signs or symptoms of lewis infection  2. History of syncope, recurrent  3. Status post permanent pacemaker, unknown type/indication  4. Seizure history  5. Low blood pressure, baseline 90/60s  6. 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 Chest pain is noncardiac by history. Would suggest blood cultures for any indication of infection. Defer to PCP/nephrology for this. 2.  Continue baby aspirin daily as there is reported history of angina per Dr. Cintia Shah previous evaluation at [facility] 2/2020. Not presently on statin and her LDL is controlled, will not add at this time. 3.  Orthostatics today notable for hypotension at 65/50 with standing without concomitant rise in heart rate.   This is undoubtedly contributing to her syncopal episodes. This is likely driven by volume depletion with dialysis. Continue midodrine and defer to nephrology for volume management. 4. Will obtain transthoracic echocardiogram for evaluation of underlying cardiac structure and function. 5.  I did have the patient's device check today. This is a [employer], dual-chamber permanent pacemaker, implant reason unknown. Implanted in [place] previously. Last interrogation was late 2020. Since that time she had 38 high atrial rate episodes, one-to-one conduction, consistent with sinus versus atrial tachycardia. She has previous ectopic atrial rhythm which does raise suspicion for atrial tachycardia. Last episode reported is June. No significant events from yesterday nor 9/30/2021. This speaks against arrhythmogenic syncope. Do not feel possible atrial tachycardia warrants treatment at this time. 6.  I referred her to electrophysiology for pacemaker care moving forward. Follow up as needed pending echocardiogram results    Scribe's attestation: This note was scribed in the presence of Agustina Jacquelin by Matt Al RN     The scribes documentation has been prepared under my direction and personally reviewed by me in its entirety. I confirm that the note above accurately reflects all work, treatment, procedures, and medical decision making performed by me. Mono Schaefer MD, personally performed the services described in this documentation as scribed by Matt Al RN  in my presence, and it is both accurate and complete to the best of our ability. I will address the patient's cardiac risk factors and adjusted pharmacologic treatment as needed. In addition, I have reinforced the need for patient directed risk factor modification. All questions and concerns were addressed to the patient/family. Alternatives to my treatment were discussed.      Thank you for allowing us to participate in the care of [employer]. Please call me with any questions [phone].     Mark Prescott MD, [facility]  Cardiovascular Disease  [address]  [phone] [facility]  [phone] [address] [place] [address]  10/21/2021 10:19 AM

## 2021-10-27 NOTE — ED NOTES
Bed: 23  Expected date:   Expected time:   Means of arrival:   Comments:  Felbierto Perezroldan patient coming [facility]  10/27/21 [id number]

## 2021-10-27 NOTE — ED NOTES
Spoke with Meaghan Regalado at [facility] with update on pt;  Tato instructed that Dr Yang Oviedo wants [facility] called to arrange next dialysis elisa Mccabe, RN  10/27/21 [id number]

## 2021-10-27 NOTE — ED PROVIDER NOTES
CHIEF COMPLAINT  Chest Pain (pt was getting dialysis and started c/o CP around right vascular site;dialsis not completed)      HISTORY OF PRESENT ILLNESS  Quincy Howell is a 80 y.o. female with a history of end-stage renal disease on dialysis Monday Wednesday Friday, hyperlipidemia, hypertension, depression who presents emergency department for evaluation of chest pain. Patient was at dialysis when she had the onset of right-sided chest wall pain. Patient states that the pain was around the site of the catheter. According to dialysis staff, there is no flow issues with the dialysis machine or any complications that were noted. This has apparently happened before. She receives dialysis through right chest wall permacath. Patient denies any difficulty breathing. Her dialysis session was not able to be completed. She denies any abdominal pain or other symptoms at this time. No other complaints, modifying factors or associated symptoms. I have reviewed the following from the nursing documentation.     Past Medical History:   Diagnosis Date    Acute kidney failure (HCC)     Anemia     Bradycardia     Cognitive communication deficit     Convulsions ([address]     Depressive disorder     ESRD (end stage renal disease) ([address]     Hemodialysis patient ([address]     Hx of blood clots     dvt lower legs    Hyperlipidemia     Hypertension     Muscle wasting     Osteomyelitis of lumbar spine ([address]     Pacemaker     biotronic verified 10/9/2020    Rhinitis, allergic     Seizure ([address]     Sepsis ([address]     Thyroid disease      Past Surgical History:   Procedure Laterality Date    COLONOSCOPY N/A 7/22/2020    COLONOSCOPY WITH BIOPSY performed by Ethan Felix MD at [address] Left 9/17/2020    LEFT UPPER EXTREMITY DIALYSIS GRAFT performed by Sabrina Porras MD at [address] IR NONTUNNELED VASCULAR CATHETER  7/24/2020    IR NONTUNNELED VASCULAR CATHETER 7/24/2020 [facility] [place] [place] SPECIAL PROCEDURES    IR TUNNELED CATHETER PLACEMENT GREATER THAN 5 YEARS  7/28/2020    IR TUNNELED CATHETER PLACEMENT GREATER THAN 5 YEARS 7/28/2020 [facility] [place] [place] SPECIAL PROCEDURES    PACEMAKER PLACEMENT      PA THROMBECTOMY,AV FISTULA DIALYS GRFT Left 9/21/2020    THROMBECTOMY, ANGIOGRAM AND REVISION USING DISTAL INFLOW OF DIALYSIS GRAFT performed by Hortencia Vieyra MD at [address] N/A 7/22/2020    EGD BIOPSY performed by Adriana Todd MD at [address] [facility]     History reviewed. No pertinent family history. Social History     Socioeconomic History    Marital status:      Spouse name: Not on file    Number of children: Not on file    Years of education: Not on file    Highest education level: Not on file   Occupational History    Not on file   Tobacco Use    Smoking status: Never Smoker    Smokeless tobacco: Never Used   Vaping Use    Vaping Use: Never used   Substance and Sexual Activity    Alcohol use: Not Currently    Drug use: Not Currently    Sexual activity: Not Currently   Other Topics Concern    Not on file   Social History Narrative    Not on file     Social Determinants of Health     Financial Resource Strain:     Difficulty of Paying Living Expenses:    Food Insecurity:     Worried About Running Out of Food in the Last Year:     [address] in the Last Year:    Transportation Needs:     Lack of Transportation (Medical):      Lack of Transportation (Non-Medical):    Physical Activity:     Days of Exercise per Week:     Minutes of Exercise per Session:    Stress:     Feeling of Stress :    Social Connections:     Frequency of Communication with Friends and Family:     Frequency of Social Gatherings with Friends and Family:     Attends Taoist Services:     Active Member of Clubs or Organizations:     Attends Club or Organization Meetings:     Marital Status:    Intimate Partner Violence:     Fear of Current or Ex-Partner:     Emotionally Abused:     Physically Abused:     Sexually Abused:      No current facility-administered medications for this encounter.      Current Outpatient Medications   Medication Sig Dispense Refill    doxycycline monohydrate (ADOXA) 100 MG tablet Take 100 mg by mouth 2 times daily      LACTOBACILLUS PO Take by mouth      sevelamer (RENVELA) 800 MG tablet Take 1 tablet by mouth 3 times daily (with meals)      pantoprazole (PROTONIX) 40 MG tablet Take 40 mg by mouth daily      Probiotic Product (ACIDOPHILUS PROBIOTIC BLEND PO) Take 1 tablet by mouth daily      mirtazapine (REMERON) 15 MG tablet Take 15 mg by mouth nightly      potassium chloride (KLOR-CON) 10 MEQ extended release tablet Take 10 mEq by mouth daily Monday Wednesday Friday dialysis days only (Patient not taking: Reported on 10/21/2021)      midodrine (PROAMATINE) 5 MG tablet Take 1 tablet by mouth as needed (HD: Give pre and mid tx for Sbp <100) (Patient taking differently: Take 10 mg by mouth as needed (HD: Give pre and mid tx for Sbp <100) ) 90 tablet 3    bismuth subsalicylate (PEPTO BISMOL) 525 MG/15ML suspension Take 30 mLs by mouth every 8 hours as needed for Indigestion      levothyroxine (SYNTHROID) 50 MCG tablet Take 75 mcg by mouth Daily       ARIPiprazole (ABILIFY) 5 MG tablet Take 5 mg by mouth daily       aspirin 81 MG EC tablet Take 81 mg by mouth daily      docusate sodium (COLACE) 100 MG capsule Take 100 mg by mouth daily as needed       levETIRAcetam 250 MG TB3D Take 500 mg by mouth 2 times daily       raloxifene (EVISTA) 60 MG tablet Take 60 mg by mouth daily      senna (SENOKOT) 8.6 MG tablet Take 1 tablet by mouth daily as needed for Constipation       vitamin C (ASCORBIC ACID) 500 MG tablet Take 500 mg by mouth daily      sertraline (ZOLOFT) 25 MG tablet Take 50 mg by mouth daily       pravastatin (PRAVACHOL) 40 MG tablet Take 40 mg by mouth daily      acetaminophen (TYLENOL) 500 MG tablet Take 500 mg by mouth every 4 hours as needed for Pain      ondansetron (ZOFRAN) 4 MG tablet Take 4 mg by mouth every 6 hours as needed for Nausea or Vomiting       Allergies   Allergen Reactions    Penicillins Rash       REVIEW OF SYSTEMS  Positive and pertinent negatives as per HPI. All other systems were reviewed and are negative. PHYSICAL EXAM  BP 98/67   Pulse 68   Temp 97.5 °F (36.4 °C) (Oral)   Resp 16   Ht 4' 8\" (1.422 m)   Wt 73 lb (33.1 kg)   SpO2 100%   BMI 16.37 kg/m²   GENERAL APPEARANCE: Awake and alert. Cooperative. HEAD: Normocephalic. Atraumatic. EYES: PERRL. EOM's grossly intact. ENT: Mucous membranes are moist.   NECK: Supple, trachea midline. No significant lymphadenopathy  HEART: RRR. No harsh murmurs. Intact radial pulses 2+ bilaterally. Right chest wall permacath with dressing in place. There is no surrounding erythema, drainage or bleeding. There is no crepitance of the chest wall. Patient has reproducible chest wall tenderness with palpation. LUNGS: Respirations unlabored without accessory muscle use. Speaking comfortably in full sentences. ABDOMEN: Soft. Non-distended. Non-tender. No guarding or rebound. EXTREMITIES: No peripheral edema. No acute deformities. SKIN: Warm and dry. No acute rashes. NEUROLOGICAL: Alert and oriented X 3. No focal deficits  PSYCHIATRIC: Normal mood and affect. LABS  I have reviewed all labs for this visit.    Results for orders placed or performed during the hospital encounter of 10/27/21   CBC Auto Differential   Result Value Ref Range    WBC 10.0 4.0 - 11.0 K/uL    RBC 3.56 (L) 4.00 - 5.20 M/uL    Hemoglobin 11.1 (L) 12.0 - 16.0 g/dL    Hematocrit 34.3 (L) 36.0 - 48.0 %    MCV 96.4 80.0 - 100.0 fL    MCH 31.2 26.0 - 34.0 pg    MCHC 32.3 31.0 - 36.0 g/dL    RDW 16.9 (H) 12.4 - 15.4 %    Platelets 189 472 - 810 K/uL    MPV 8.4 5.0 - 10.5 fL    Neutrophils % 74.2 %    Lymphocytes % 14.1 %    Monocytes % 9.9 %    Eosinophils % 0.6 % Basophils % 1.2 %    Neutrophils Absolute 7.4 1.7 - 7.7 K/uL    Lymphocytes Absolute 1.4 1.0 - 5.1 K/uL    Monocytes Absolute 1.0 0.0 - 1.3 K/uL    Eosinophils Absolute 0.1 0.0 - 0.6 K/uL    Basophils Absolute 0.1 0.0 - 0.2 K/uL   Comprehensive Metabolic Panel w/ Reflex to MG   Result Value Ref Range    Sodium 135 (L) 136 - 145 mmol/L    Potassium reflex Magnesium 4.3 3.5 - 5.1 mmol/L    Chloride 100 99 - 110 mmol/L    CO2 24 21 - 32 mmol/L    Anion Gap 11 3 - 16    Glucose 98 70 - 99 mg/dL    BUN 28 (H) 7 - 20 mg/dL    CREATININE 3.8 (H) 0.6 - 1.2 mg/dL    GFR Non- 12 (A) >60    GFR  14 (A) >60    Calcium 8.5 8.3 - 10.6 mg/dL    Total Protein 5.4 (L) 6.4 - 8.2 g/dL    Albumin 2.5 (L) 3.4 - 5.0 g/dL    Albumin/Globulin Ratio 0.9 (L) 1.1 - 2.2    Total Bilirubin 0.8 0.0 - 1.0 mg/dL    Alkaline Phosphatase 367 (H) 40 - 129 U/L    ALT 16 10 - 40 U/L    AST 29 15 - 37 U/L    Globulin 2.9 Not Established g/dL   Troponin   Result Value Ref Range    Troponin 0.07 (H) <0.01 ng/mL   Brain Natriuretic Peptide   Result Value Ref Range    Pro-BNP 18,269 (H) 0 - 124 pg/mL   Troponin   Result Value Ref Range    Troponin 0.06 (H) <0.01 ng/mL   EKG 12 Lead   Result Value Ref Range    Ventricular Rate 73 BPM    Atrial Rate 73 BPM    P-R Interval 192 ms    QRS Duration 60 ms    Q-T Interval 390 ms    QTc Calculation (Bazett) 429 ms    R Axis -22 degrees    T Axis 28 degrees    Diagnosis       Normal sinus rhythmPossible Anterior infarct , age undeterminedAbnormal ECGWhen compared with ECG of 30-SEP-2021 18:16,Sinus rhythm has replaced Ectopic atrial rhythm       EKG  The Ekg interpreted by myself in the emergency department in the absence of a cardiologist.  normal sinus rhythm with a rate of 73  Axis is   Normal  QTc is  within an acceptable range  Intervals and Durations are unremarkable. No specific ST-T wave changes appreciated.   Nonspecific T wave flattening in lead III, aVF, V3  No evidence of acute ischemia. No significant change from prior EKG dated 9/30/2021    RADIOLOGY  X-RAYS:  I have reviewed radiologic plain film image(s). ALL OTHER NON-PLAIN FILM IMAGES SUCH AS CT, ULTRASOUND AND MRI HAVE BEEN READ BY THE RADIOLOGIST. XR CHEST PORTABLE   Final Result   No acute process. ED COURSE/MDM  Patient seen and evaluated. Old records reviewed. Labs and imaging reviewed and results discussed with patient. This is a 73-year-old female presents for evaluation of chest wall pain during dialysis session. Patient arrives with blood pressure 89/63, intact mentation, no focal deficits. Right chest wall permacath looks clean dry and intact, no drainage, no bleeding surrounding this catheter. She has reproducible chest wall tenderness with palpation. ED evaluation because of her risk factors will include basic labs, cardiac panel. EKG is nonischemic. We will obtain chest x-ray. If laboratory evaluation is unremarkable from her baseline values, patient will be discharged home. We will facilitate plan for her to have dialysis performed tomorrow. Lab evaluation reveals stable hemoglobin 11.1. Calcium within normal limits at 4.3. Findings of end-stage renal disease with creatinine 3.8. Troponin is mildly elevated at 0.07, 0.06 which is at her baseline in regards to end-stage renal disease. She has no chest pain at this time. She has reproducible tenderness over her dialysis access site however there appears to be no complication related to the dialysis access. Patient will be discharged back to her nursing facility and advised on scheduling dialysis session tomorrow. The patient will be discharged from the emergency department. The patient was counseled on their diagnosis and any medications prescribed. They were advised on the need for PCP followup.  They were counseled on the need to return to the emergency department if any of their symptoms were to worsen, change or have any other concerns. Discharged in stable condition. CLINICAL IMPRESSION  1. Chest wall pain        Blood pressure 98/67, pulse 68, temperature 97.5 °F (36.4 °C), temperature source Oral, resp. rate 16, height 4' 8\" (1.422 m), weight 73 lb (33.1 kg), SpO2 100 %. DISPOSITION  Anh Bynum was discharged to home in stable condition. This chart was generated in part by using Dragon Dictation system and may contain errors related to that system including errors in grammar, punctuation, and spelling, as well as words and phrases that may be inappropriate. If there are any questions or concerns please feel free to contact the dictating provider for clarification.      Cate Barraza MD  10/27/21 1518

## 2021-10-28 NOTE — ED NOTES
Report received care assumed sitting up in bed watching t.v.. reporting minimal tenderness at vas-cath site, no chest pain. Respirations easy and unlabored.  Taking po fluids and food with no difficulty      Vance Rojo RN  10/27/21 2005

## 2021-10-28 NOTE — ED NOTES
Report to Bari SOLORZANO, [facility], Patient VS priort o d/c was discussed with Dr. Ana Interiano, patient okay to go back as she is asymptomatic, low weight and at her baseline.  EMS informed of vitals      Mickey Garcia RN  10/27/21 2811

## 2021-11-04 NOTE — PROGRESS NOTES
Patient presents to the device clinic today for a programming evaluation for her pacemaker. Patient has a history of S/C. Last device interrogation was on 6/3 at [facility]. Since then, 1 AT event on 6/16 w/ max VR of 162 bpm.     AP 0.0%  1%    All sensing and pacing parameters are within normal range. No changes need to be made at this time. Patient education was provided about device functionality, in home monitoring, and any other patient questions and/or concerns were addressed. Patient voices understanding. Please see interrogation for more detail. Patient will see Dr. Mayito Sood today in office. Patient will follow up in 3 months in office or remotely. See Paceart report under the Cardiology tab.

## 2021-11-04 NOTE — PATIENT INSTRUCTIONS
Schedule 3 month in office device check w/ Device Clinic. RECOMMENDATIONS:  1. Increase fluid and salt intake. 2. Start fludrocortisone 0.1 mg daily. 3. Schedule device checks in 3 months. 4. Follow up with EP NP in 6 months.

## 2021-11-04 NOTE — LETTER
[address]   Electrophysiology Consult Note              Date: 11/4/21  Patient Name: Rafael Samuel  YOB: 1950    Primary Care Physician: Coleen Angela MD    CHIEF COMPLAINT:   Chief Complaint   Patient presents with    New Patient    Chest Pain     right upper side of chest    Shortness of Breath     on exertion    Loss of Consciousness     HISTORY OF PRESENT ILLNESS: Rafael Samuel is a 80 y.o. female with a PMH significant for end stage renal disease (on dialysis), blood clots, and dual chamber pacemaker placed in [place] in 2019 for bradycardia. Patient presented to the ED on 9/30/2021 with complaints of syncope. She passed out and fell out of her wheelchair at the nursing home where she lives. She was then seen by cardiology in clinic on 10/21/2021. Patient presented to the ED on 10/27/2021 with complaints of chest pain. She is referred to EP for device management. Today, 11/4/2021, ECG demonstrates SR (88). Device interrogation today demonstrated AP <1%,  <1%, events no, AUGUSTIN 8.5 years. She states that when she had her syncopal episode, she felt lightheaded. She states that her BP normally runs low. She states that she feels lightheaded often. Patient denies current edema, chest pain, sob, palpitations. Past Medical History:   has a past medical history of Acute kidney failure ([address] Anemia, Bradycardia, Cognitive communication deficit, Convulsions ([address] Depressive disorder, ESRD (end stage renal disease) ([address] Hemodialysis patient ([address] Hx of blood clots, Hyperlipidemia, Hypertension, Muscle wasting, Osteomyelitis of lumbar spine ([address] Pacemaker, Rhinitis, allergic, Seizure ([address] Sepsis ([address] and Thyroid disease. Past Surgical History:   has a past surgical history that includes Upper gastrointestinal endoscopy (N/A, 7/22/2020); Colonoscopy (N/A, 7/22/2020); IR NONTUNNELED VASCULAR CATHETER > 5 YEARS (7/24/2020);  IR TUNNELED CVC PLACE WO SQ PORT/PUMP > 5 YEARS (7/28/2020); Dialysis fistula creation (Left, 9/17/2020); pr thrombectomy,av fistula dialys grft (Left, 9/21/2020); and pacemaker placement. Allergies:  Penicillins    Social History:   reports that she has never smoked. She has never used smokeless tobacco. She reports previous alcohol use. She reports previous drug use. Family History: family history is not on file. Home Medications:    Prior to Admission medications    Medication Sig Start Date End Date Taking?  Authorizing Provider   doxycycline monohydrate (ADOXA) 100 MG tablet Take 100 mg by mouth 2 times daily   Yes Historical Provider, MD   LACTOBACILLUS PO Take by mouth   Yes Historical Provider, MD   sevelamer (RENVELA) 800 MG tablet Take 1 tablet by mouth 3 times daily (with meals)   Yes Historical Provider, MD   pantoprazole (PROTONIX) 40 MG tablet Take 40 mg by mouth daily   Yes Historical Provider, MD   Probiotic Product (ACIDOPHILUS PROBIOTIC BLEND PO) Take 1 tablet by mouth daily   Yes Historical Provider, MD   mirtazapine (REMERON) 15 MG tablet Take 15 mg by mouth nightly   Yes Historical Provider, MD   midodrine (PROAMATINE) 5 MG tablet Take 1 tablet by mouth as needed (HD: Give pre and mid tx for Sbp <100)  Patient taking differently: Take 10 mg by mouth as needed (HD: Give pre and mid tx for Sbp <100)  7/28/20  Yes Irine Hodgkins, MD   bismuth subsalicylate (PEPTO BISMOL) 525 MG/15ML suspension Take 30 mLs by mouth every 8 hours as needed for Indigestion   Yes Historical Provider, MD   levothyroxine (SYNTHROID) 50 MCG tablet Take 75 mcg by mouth Daily    Yes Historical Provider, MD   ARIPiprazole (ABILIFY) 5 MG tablet Take 5 mg by mouth daily    Yes Historical Provider, MD   aspirin 81 MG EC tablet Take 81 mg by mouth daily   Yes Historical Provider, MD   docusate sodium (COLACE) 100 MG capsule Take 100 mg by mouth daily as needed    Yes Historical Provider, MD   raloxifene (EVISTA) 60 MG tablet Take 60 mg by mouth daily Yes Historical Provider, MD   senna (SENOKOT) 8.6 MG tablet Take 1 tablet by mouth daily as needed for Constipation    Yes Historical Provider, MD   vitamin C (ASCORBIC ACID) 500 MG tablet Take 500 mg by mouth daily   Yes Historical Provider, MD   sertraline (ZOLOFT) 25 MG tablet Take 50 mg by mouth daily    Yes Historical Provider, MD   pravastatin (PRAVACHOL) 40 MG tablet Take 40 mg by mouth daily   Yes Historical Provider, MD   acetaminophen (TYLENOL) 500 MG tablet Take 500 mg by mouth every 4 hours as needed for Pain   Yes Historical Provider, MD   ondansetron (ZOFRAN) 4 MG tablet Take 4 mg by mouth every 6 hours as needed for Nausea or Vomiting   Yes Historical Provider, MD   potassium chloride (KLOR-CON) 10 MEQ extended release tablet Take 10 mEq by mouth daily Monday Wednesday Friday dialysis days only  Patient not taking: Reported on 10/21/2021    Historical Provider, MD   levETIRAcetam 250 MG TB3D Take 500 mg by mouth 2 times daily     Historical Provider, MD       REVIEW OF SYSTEMS:    All 14-point review of systems are completed and  pertinent positives are mentioned in the history of present illness. Other  systems are reviewed and are negative. Physical Examination:    BP (!) 68/43   Pulse 88   Ht 4' 8\" (1.422 m)   Wt 72 lb 8 oz (32.9 kg)   SpO2 98%   BMI 16.25 kg/m²      Constitutional and General Appearance:    alert, cooperative, no distress and appears stated age  HEENT:    PERRLA, no cervical lymphadenopathy. No masses palpable. Normal oral  mucosa  Respiratory:  · Normal excursion and expansion without use of accessory muscles  · Resp Auscultation: Normal breath sounds without dullness or wheezing  Cardiovascular:  · The apical impulse is not displaced  · RRR S1S2 w/o M/G/R  Abdomen:  · No masses or tenderness  · Bowel sounds present  Extremities:  ·  No Cyanosis or Clubbing  ·  Lower extremity edema: Yes and No  · Skin: Warm and dry, vascath in place on right.   Neurological:  · Alert and oriented. · Moves all extremities well  · No abnormalities of mood, affect, memory, mentation, or behavior are noted    DATA:    ECG 11/4/21: Personally reviewed. Echo 10/8/2020  Summary   Left ventricular systolic function is normal with ejection fraction   estimated at 55-65 %. No regional wall motion abnormalities are noted. Left ventricular size is decreased. Grade I diastolic dysfunction with normal filling pressure. Mild tricuspid regurgitation. Normal systolic pulmonary artery pressure (SPAP) estimated at 25 mmHg (RA   pressure 3 mmHg). IMPRESSION:    1. Syncope. 11/4/2021  Patient is a poor historian however is a very pleasant 80year old female with a medical history significant for SSS status post DC pacemaker (Biotronik), ESRD, hypotension, anemia, and seizure disorder who presents from home to \[facility]\"" care. Patient had a syncopal event in 09/2021 that was not arrhythmogenic in nature. I suspect this was related to her chronic hypotension for which she is on midodrine. I would suggest florinef and slight increase in salt intake along with compression stockings. We will enroll her into our remote monitoring system. - Start florinef. - Continue midodrine.  - Slight increase in salt intake. - Enroll in remote monitoring at [facility].  - Compression stockings.    - Follow up with EP NP in 6 months unless/until procedure/discussion required or PRN. 2. Hypotension.  - Plan as per above. 3. End stage renal disease. Per nephrology. RECOMMENDATIONS:  1. Increase fluid and salt intake. 2. Start fludrocortisone 0.1 mg daily. 3. Schedule device checks in 3 months. 4. Follow up with EP NP in 6 months. QUALITY MEASURES  1. Tobacco Cessation Counseling: NA  2. Retake of BP if >140/90:   NA  3. Documentation to PCP/referring for new patient:  Sent to PCP at close of office visit  4. CAD patient on anti-platelet: NA  5. CAD patient on STATIN therapy:  NA  6.  Patient with CHF and aFib on anticoagulation:  NA     All questions and concerns were addressed to the patient/family. Alternatives to my treatment were discussed. Dr. Luana Bergeron MD  Electrophysiology  [facility]. [address]. [address]. [place]  Phone: [phone]  Fax: [phone]     NOTE: This report was transcribed using voice recognition software. Every effort was made to ensure accuracy, however, inadvertent computerized transcription errors may be present. Aura Mandel RN, am scribing for and in the presence of Dr. Yazmin Lr. 11/04/21 9:36 AM   Chelsea Duong RN    The scribe's documentation has been prepared under my direction and personally reviewed by me in its entirety. I confirm that the note above accurately reflects all work, physical examination, the discussion of treatments and procedures, and medical decision making performed by me. Emmanuel Husain MD personally performed the services described in this documentation as scribed by nurse in my presence, and is both accurate and complete.     Electronically signed by Argenis Conrad MD on 11/4/2021 at 4:23 PM

## 2021-11-04 NOTE — PROGRESS NOTES
[address]   Electrophysiology Consult Note              Date: 11/4/21  Patient Name: Lisseth Patino  YOB: 1950    Primary Care Physician: Tessy Solis MD    CHIEF COMPLAINT:   Chief Complaint   Patient presents with    New Patient    Chest Pain     right upper side of chest    Shortness of Breath     on exertion    Loss of Consciousness     HISTORY OF PRESENT ILLNESS: Lisseth Patino is a 80 y.o. female with a PMH significant for end stage renal disease (on dialysis), blood clots, and dual chamber pacemaker placed in [place] in 2019 for bradycardia. Patient presented to the ED on 9/30/2021 with complaints of syncope. She passed out and fell out of her wheelchair at the nursing home where she lives. She was then seen by cardiology in clinic on 10/21/2021. Patient presented to the ED on 10/27/2021 with complaints of chest pain. She is referred to EP for device management. Today, 11/4/2021, ECG demonstrates SR (88). Device interrogation today demonstrated AP <1%,  <1%, events no, AUGUSTIN 8.5 years. She states that when she had her syncopal episode, she felt lightheaded. She states that her BP normally runs low. She states that she feels lightheaded often. Patient denies current edema, chest pain, sob, palpitations. Past Medical History:   has a past medical history of Acute kidney failure ([address] Anemia, Bradycardia, Cognitive communication deficit, Convulsions ([address] Depressive disorder, ESRD (end stage renal disease) ([address] Hemodialysis patient ([address] Hx of blood clots, Hyperlipidemia, Hypertension, Muscle wasting, Osteomyelitis of lumbar spine ([address] Pacemaker, Rhinitis, allergic, Seizure ([address] Sepsis ([address] and Thyroid disease. Past Surgical History:   has a past surgical history that includes Upper gastrointestinal endoscopy (N/A, 7/22/2020); Colonoscopy (N/A, 7/22/2020); IR NONTUNNELED VASCULAR CATHETER > 5 YEARS (7/24/2020);  IR TUNNELED CVC PLACE WO SQ PORT/PUMP > 5 YEARS (7/28/2020); Dialysis fistula creation (Left, 9/17/2020); pr thrombectomy,av fistula dialys grft (Left, 9/21/2020); and pacemaker placement. Allergies:  Penicillins    Social History:   reports that she has never smoked. She has never used smokeless tobacco. She reports previous alcohol use. She reports previous drug use. Family History: family history is not on file. Home Medications:    Prior to Admission medications    Medication Sig Start Date End Date Taking?  Authorizing Provider   doxycycline monohydrate (ADOXA) 100 MG tablet Take 100 mg by mouth 2 times daily   Yes Historical Provider, MD   LACTOBACILLUS PO Take by mouth   Yes Historical Provider, MD   sevelamer (RENVELA) 800 MG tablet Take 1 tablet by mouth 3 times daily (with meals)   Yes Historical Provider, MD   pantoprazole (PROTONIX) 40 MG tablet Take 40 mg by mouth daily   Yes Historical Provider, MD   Probiotic Product (ACIDOPHILUS PROBIOTIC BLEND PO) Take 1 tablet by mouth daily   Yes Historical Provider, MD   mirtazapine (REMERON) 15 MG tablet Take 15 mg by mouth nightly   Yes Historical Provider, MD   midodrine (PROAMATINE) 5 MG tablet Take 1 tablet by mouth as needed (HD: Give pre and mid tx for Sbp <100)  Patient taking differently: Take 10 mg by mouth as needed (HD: Give pre and mid tx for Sbp <100)  7/28/20  Yes John Max MD   bismuth subsalicylate (PEPTO BISMOL) 525 MG/15ML suspension Take 30 mLs by mouth every 8 hours as needed for Indigestion   Yes Historical Provider, MD   levothyroxine (SYNTHROID) 50 MCG tablet Take 75 mcg by mouth Daily    Yes Historical Provider, MD   ARIPiprazole (ABILIFY) 5 MG tablet Take 5 mg by mouth daily    Yes Historical Provider, MD   aspirin 81 MG EC tablet Take 81 mg by mouth daily   Yes Historical Provider, MD   docusate sodium (COLACE) 100 MG capsule Take 100 mg by mouth daily as needed    Yes Historical Provider, MD   raloxifene (EVISTA) 60 MG tablet Take 60 mg by mouth daily Yes Historical Provider, MD   senna (SENOKOT) 8.6 MG tablet Take 1 tablet by mouth daily as needed for Constipation    Yes Historical Provider, MD   vitamin C (ASCORBIC ACID) 500 MG tablet Take 500 mg by mouth daily   Yes Historical Provider, MD   sertraline (ZOLOFT) 25 MG tablet Take 50 mg by mouth daily    Yes Historical Provider, MD   pravastatin (PRAVACHOL) 40 MG tablet Take 40 mg by mouth daily   Yes Historical Provider, MD   acetaminophen (TYLENOL) 500 MG tablet Take 500 mg by mouth every 4 hours as needed for Pain   Yes Historical Provider, MD   ondansetron (ZOFRAN) 4 MG tablet Take 4 mg by mouth every 6 hours as needed for Nausea or Vomiting   Yes Historical Provider, MD   potassium chloride (KLOR-CON) 10 MEQ extended release tablet Take 10 mEq by mouth daily Monday Wednesday Friday dialysis days only  Patient not taking: Reported on 10/21/2021    Historical Provider, MD   levETIRAcetam 250 MG TB3D Take 500 mg by mouth 2 times daily     Historical Provider, MD       REVIEW OF SYSTEMS:    All 14-point review of systems are completed and  pertinent positives are mentioned in the history of present illness. Other  systems are reviewed and are negative. Physical Examination:    BP (!) 68/43   Pulse 88   Ht 4' 8\" (1.422 m)   Wt 72 lb 8 oz (32.9 kg)   SpO2 98%   BMI 16.25 kg/m²      Constitutional and General Appearance:    alert, cooperative, no distress and appears stated age  HEENT:    PERRLA, no cervical lymphadenopathy. No masses palpable. Normal oral  mucosa  Respiratory:  · Normal excursion and expansion without use of accessory muscles  · Resp Auscultation: Normal breath sounds without dullness or wheezing  Cardiovascular:  · The apical impulse is not displaced  · RRR S1S2 w/o M/G/R  Abdomen:  · No masses or tenderness  · Bowel sounds present  Extremities:  ·  No Cyanosis or Clubbing  ·  Lower extremity edema: Yes and No  · Skin: Warm and dry, vascath in place on right.   Neurological:  · Alert and oriented. · Moves all extremities well  · No abnormalities of mood, affect, memory, mentation, or behavior are noted    DATA:    ECG 11/4/21: Personally reviewed. Echo 10/8/2020  Summary   Left ventricular systolic function is normal with ejection fraction   estimated at 55-65 %. No regional wall motion abnormalities are noted. Left ventricular size is decreased. Grade I diastolic dysfunction with normal filling pressure. Mild tricuspid regurgitation. Normal systolic pulmonary artery pressure (SPAP) estimated at 25 mmHg (RA   pressure 3 mmHg). IMPRESSION:    1. Syncope. 11/4/2021  Patient is a poor historian however is a very pleasant 80year old female with a medical history significant for SSS status post DC pacemaker (Biotronik), ESRD, hypotension, anemia, and seizure disorder who presents from home to [facility] care. Patient had a syncopal event in 09/2021 that was not arrhythmogenic in nature. I suspect this was related to her chronic hypotension for which she is on midodrine. I would suggest florinef and slight increase in salt intake along with compression stockings. We will enroll her into our remote monitoring system. - Start florinef. - Continue midodrine.  - Slight increase in salt intake. - Enroll in remote monitoring at [facility].  - Compression stockings.    - Follow up with EP NP in 6 months unless/until procedure/discussion required or PRN. 2. Hypotension.  - Plan as per above. 3. End stage renal disease. Per nephrology. RECOMMENDATIONS:  1. Increase fluid and salt intake. 2. Start fludrocortisone 0.1 mg daily. 3. Schedule device checks in 3 months. 4. Follow up with EP NP in 6 months. QUALITY MEASURES  1. Tobacco Cessation Counseling: NA  2. Retake of BP if >140/90:   NA  3. Documentation to PCP/referring for new patient:  Sent to PCP at close of office visit  4. CAD patient on anti-platelet: NA  5. CAD patient on STATIN therapy:  NA  6.  Patient with CHF and aFib on anticoagulation:  NA     All questions and concerns were addressed to the patient/family. Alternatives to my treatment were discussed. Dr. Priti Black MD  Electrophysiology  [facility]. [address]. [place] [address]  Phone: [phone]  Fax: [phone]     NOTE: This report was transcribed using voice recognition software. Every effort was made to ensure accuracy, however, inadvertent computerized transcription errors may be present. Peggi Holter, RN, am scribing for and in the presence of Dr. Eva Da Silva. 11/04/21 9:36 AM   Venice Rodriguez RN    The scribe's documentation has been prepared under my direction and personally reviewed by me in its entirety. I confirm that the note above accurately reflects all work, physical examination, the discussion of treatments and procedures, and medical decision making performed by me. Jennifer Rivera MD personally performed the services described in this documentation as scribed by nurse in my presence, and is both accurate and complete.     Electronically signed by Gianni Presley MD on 11/4/2021 at 4:23 PM

## 2021-11-15 PROBLEM — R57.9 SHOCK (HCC): Status: ACTIVE | Noted: 2021-01-01

## 2021-11-15 NOTE — ED NOTES
Bed: 25  Expected date:   Expected time:   Means of arrival:   Comments:  martín Husain RN  11/15/21 9022

## 2021-11-15 NOTE — [facility] REMOTE MONITORING
Spoke with primary RN [facility] regarding 3 and 6 hour Sepsis bundle.                   Thank you,    GLADYS DuboisN, RN, [facility]: [phone]

## 2021-11-15 NOTE — TELEPHONE ENCOUNTER
----- Message from Cj Sexton MD sent at 11/15/2021  8:45 AM EST -----  Please let the patient know the following: Normal echocardiogram showing normal heart strength and no significant valve disease. Pacemaker leads appear similar to prior study. Follow up as planned.

## 2021-11-15 NOTE — ED NOTES
RECEIVED CALL FROM NURSING HOME PT USUALLY 'G SYSTOLIC, DR WILKINS NOTIFIED OF FINDING AND NOTIFIED OF BP.      [place], [facility]  11/15/21 7173

## 2021-11-16 NOTE — ED NOTES
PT RESTING IN BED AT THIS TIME, AWAITING BED, SKIN PWD, RESP EVEN AND UNLABORED     Lydia Waite, [address] [facility]  11/15/21 7085

## 2021-11-16 NOTE — ED NOTES
HOSP AT [facility] [address], [facility]  11/15/21 4940

## 2021-11-16 NOTE — PROGRESS NOTES
sensory/motor deficits. Cranial nerves: II-XII intact, grossly non-focal.  Psychiatric: Alert and oriented, thought content appropriate, normal insight  Capillary Refill: Brisk,3 seconds, normal   Peripheral Pulses: +2 palpable, equal bilaterally       Labs:   Recent Labs     11/15/21  1500 11/16/21  0459   WBC 8.2  --    HGB 11.1*  --    HCT 35.2* 32.4*   PLT 73*  --      Recent Labs     11/15/21  1500 11/15/21  2101   *  --    K 4.5  --      --    CO2 19*  --    BUN 6*  --    CREATININE 2.0*  --    CALCIUM 7.5*  --    PHOS  --  1.6*     Recent Labs     11/15/21  1500   AST 85*   ALT 26   BILITOT 0.8   ALKPHOS 298*     No results for input(s): INR in the last 72 hours. Recent Labs     11/15/21  1500 11/15/21  2101   TROPONINI 0.04* 0.04*       Urinalysis:      Lab Results   Component Value Date    NITRU Negative 07/17/2020    WBCUA 21-50 07/17/2020    BACTERIA 3+ 07/17/2020    RBCUA  07/17/2020    BLOODU LARGE 07/17/2020    SPECGRAV 1.025 07/17/2020    GLUCOSEU Negative 07/17/2020       Radiology:  CT CHEST WO CONTRAST   Final Result   1. Nonspecific small volume bilateral pleural effusion and bibasilar   relaxation atelectasis. Pleurisy is a consideration. 2. Nonspecific mild mediastinal and right hilar lymph node enlargement is   likely reactive. Consider IV contrast-enhanced CT chest follow-up in 3   months to ensure stability. 3. 8 mm ground glass pulmonary nodule lateral mid right lung. CT chest   surveillance recommended in 6-12 months. 4. Small volume pericardial effusion. Pericarditis is a consideration. 5. Small volume ascites. CT HEAD WO CONTRAST   Final Result   No acute intracranial abnormality. CT ABDOMEN PELVIS WO CONTRAST Additional Contrast? None   Final Result   Mild colitis involving the ascending and proximal transverse colon and a   probable diffuse mild ileus extending to the rectum with no obvious bowel   obstruction.       Mild ascites in the abdomen and pelvis is more prominent. There is mild   stranding in the mesentery throughout which is more prominent and may just be   due to the ascites or possible early inflammation vs infiltration of the   mesentery from other etiology. Recommend continued follow-up. Mild hepatosplenomegaly which is more prominent. Poor visualization of the gallbladder which is probably severely contracted   with associated cholelithiasis which is more prominent. Suggest ultrasound   correlation      Mildly atrophic kidneys which is more prominent with no hydronephrosis or   urinary obstruction      Previous hysterectomy with no pelvic mass or active inflammation in the pelvis      Tiny bibasilar pleural effusions which are less prominent      Small pericardial effusion which is more prominent         XR CHEST PORTABLE   Final Result   No radiographic evidence of acute pulmonary disease. Assessment/Plan:    Active Hospital Problems    Diagnosis     Shock [facility]) [R57.9]        \"79 y.o. female who presented to [facility] with past medical history of depression disorder, unstable disease on dialysis, hyperlipidemia, hypertension, seizure, sepsis, hypothyroidism presented to the ED due to low blood pressure during her outpatient dialysis session. \"      [id number], without radiographic evidence of pneumonia, also no respiratory symptoms or hypoxia. - supportive care. She completed her second Pfizer vaccine in 1/2021. Hypotension. Clinically this infection wasn't severe enough to cause septic shock. More related to the fact that she is a frail 80year-old dialysis patient who weighs 76 lbs. Her ECF says her SBP is usually in the 100's. Improved with IVFs, also gave a dose of midodrine. Diarrhea. Supportive care. F/u c.diff. ESRD on HD MWF. Nephrology consulted.      Incidental findings on CT chest, will need f/u with PCP: lymph nodes will need another CT with IV contrast in 3 months, nodule will need another CT chest without contrast in 6-12 months. Alternatively, a PCP might decided that a cancer diagnosis would be less relevant at this point. DVT Prophylaxis: SCDs due to thrombocytopenia  Diet: ADULT DIET; Regular; 3 carb choices (45 gm/meal); Low Fat/Low Chol/High Fiber/TODD; Low Sodium (2 gm); Low Potassium (Less than 3000 mg/day); Low Phosphorus (Less than 1000 mg)  Code Status: Full Code    PT/OT Eval Status: not indicated    Dispo - perhaps 11/17, if she is still on RA and if her BP is stable. She reportedly lives at an [facility].       Allen Nieto MD

## 2021-11-16 NOTE — H&P
Hospital Medicine History & Physical      PCP: Sami Ralph MD    Date of Admission: 11/15/2021    Date of Service: Pt seen/examined on 11/15/2021  Pt seen/examined face to face on and admitted as inpatient with expected LOS greater than two midnights due to medical therapy  Chief Complaint:    Chief Complaint   Patient presents with    Hypotension     After dialysis patient sys 70's was given a liter of fluids then sys 100's        History Of Present Illness:      80 y.o. female who presented to [facility] with past medical history of depression disorder, unstable disease on dialysis, hyperlipidemia, hypertension, seizure, sepsis, hypothyroidism presented to the ED due to low blood pressure. Patient is encephalopathic thus history is limited. I attempted to call family but no response. Per chart review patient was found to have low blood pressure receiving dialysis and thus was sent here for further monitoring. Patient otherwise denied having any fever chills nausea vomiting chest pain shortness of breath abdominal pain dysuria. Patient does report having diarrhea in addition to vomiting. Described as watery no known alleviating or exacerbating factor.         Past Medical History:          Diagnosis Date    Acute kidney failure (HCC)     Anemia     Bradycardia     Cognitive communication deficit     Convulsions ([address]     Depressive disorder     ESRD (end stage renal disease) ([address]     Hemodialysis patient ([address]     Hx of blood clots     dvt lower legs    Hyperlipidemia     Hypertension     Muscle wasting     Osteomyelitis of lumbar spine ([address]     Pacemaker     biotronic verified 10/9/2020    Rhinitis, allergic     Seizure ([address]     Sepsis ([address]     Thyroid disease        Past Surgical History:          Procedure Laterality Date    COLONOSCOPY N/A 7/22/2020    COLONOSCOPY WITH BIOPSY performed by Dot Wilson MD at [facility] CREATION Left 9/17/2020    LEFT UPPER EXTREMITY DIALYSIS GRAFT performed by Tavo Loving MD at [facility] IR NONTUNNELED VASCULAR CATHETER  7/24/2020    IR NONTUNNELED VASCULAR CATHETER 7/24/2020 [facility] SPECIAL PROCEDURES    IR TUNNELED CATHETER PLACEMENT GREATER THAN 5 YEARS  7/28/2020    IR TUNNELED CATHETER PLACEMENT GREATER THAN 5 YEARS 7/28/2020 [facility] SPECIAL PROCEDURES    PACEMAKER PLACEMENT      NM THROMBECTOMY,AV FISTULA DIALYS GRFT Left 9/21/2020    THROMBECTOMY, ANGIOGRAM AND REVISION USING DISTAL INFLOW OF DIALYSIS GRAFT performed by Tavo Loving MD at [address] [place] [address] N/A 7/22/2020    EGD BIOPSY performed by Natanael Oliver MD at [facility]       Medications Prior to Admission:      Prior to Admission medications    Medication Sig Start Date End Date Taking?  Authorizing Provider   fludrocortisone (FLORINEF) 0.1 MG tablet Take 1 tablet by mouth daily 11/4/21 12/4/21  SARBJIT Palencia MD   doxycycline monohydrate (ADOXA) 100 MG tablet Take 100 mg by mouth 2 times daily    Historical Provider, MD   LACTOBACILLUS PO Take by mouth    Historical Provider, MD   sevelamer (RENVELA) 800 MG tablet Take 1 tablet by mouth 3 times daily (with meals)    Historical Provider, MD   pantoprazole (PROTONIX) 40 MG tablet Take 40 mg by mouth daily    Historical Provider, MD   Probiotic Product (ACIDOPHILUS PROBIOTIC BLEND PO) Take 1 tablet by mouth daily    Historical Provider, MD   mirtazapine (REMERON) 15 MG tablet Take 15 mg by mouth nightly    Historical Provider, MD   potassium chloride (KLOR-CON) 10 MEQ extended release tablet Take 10 mEq by mouth daily Monday Wednesday Friday dialysis days only  Patient not taking: Reported on 10/21/2021    Historical Provider, MD   midodrine (PROAMATINE) 5 MG tablet Take 1 tablet by mouth as needed (HD: Give pre and mid tx for Sbp <100)  Patient taking differently: Take 10 mg by mouth as needed (HD: Give pre and mid tx for Sbp <100)  7/28/20   Jody Ward MD   bismuth subsalicylate (PEPTO BISMOL) 525 MG/15ML suspension Take 30 mLs by mouth every 8 hours as needed for Indigestion    Historical Provider, MD   levothyroxine (SYNTHROID) 50 MCG tablet Take 75 mcg by mouth Daily     Historical Provider, MD   ARIPiprazole (ABILIFY) 5 MG tablet Take 5 mg by mouth daily     Historical Provider, MD   aspirin 81 MG EC tablet Take 81 mg by mouth daily    Historical Provider, MD   docusate sodium (COLACE) 100 MG capsule Take 100 mg by mouth daily as needed     Historical Provider, MD   levETIRAcetam 250 MG TB3D Take 500 mg by mouth 2 times daily     Historical Provider, MD   raloxifene (EVISTA) 60 MG tablet Take 60 mg by mouth daily    Historical Provider, MD   senna (SENOKOT) 8.6 MG tablet Take 1 tablet by mouth daily as needed for Constipation     Historical Provider, MD   vitamin C (ASCORBIC ACID) 500 MG tablet Take 500 mg by mouth daily    Historical Provider, MD   sertraline (ZOLOFT) 25 MG tablet Take 50 mg by mouth daily     Historical Provider, MD   pravastatin (PRAVACHOL) 40 MG tablet Take 40 mg by mouth daily    Historical Provider, MD   acetaminophen (TYLENOL) 500 MG tablet Take 500 mg by mouth every 4 hours as needed for Pain    Historical Provider, MD   ondansetron (ZOFRAN) 4 MG tablet Take 4 mg by mouth every 6 hours as needed for Nausea or Vomiting    Historical Provider, MD       Allergies:  Penicillins    Social History:          TOBACCO:   reports that she has never smoked. She has never used smokeless tobacco.  ETOH:   reports previous alcohol use. E-Cigarettes/Vaping Use     Questions Responses    E-Cigarette/Vaping Use Never User    Start Date     Passive Exposure     Quit Date     Counseling Given     Comments             Family History:      Reviewed in detail in the chart, and noncontributory.   Patient is encephalopathic thus history is limited    REVIEW OF SYSTEMS:   Limited accuracy due to patient being encephalopathic. Constitutional:  No Fever, No Chills, No Night Sweats  ENT/Mouth:  No Nasal Congestion,  No Hoarseness, No new mouth lesion  Eyes:  No Eye Pain, No Redness, No Discharge  Cardiovascular:  No Chest Pain, No Orthopnea, No Palpitations  Respiratory:  No Cough, No Sputum, No Dyspnea  Gastrointestinal: No Vomiting, No Diarrhea, No abdominal pain  Genitourinary: No Urinary Frequency, No Hematuria, No Urinary pain  Musculoskeletal:  No worsening Arthralgias, No worsening Myalgias  Skin:  No new Skin Lesions, No new skin rash  Neuro:  No new weakness, No new numbness. Psych:  No suicial ideation, No Violence ideation    PHYSICAL EXAM PERFORMED:    BP (!) 76/59   Pulse 80   Temp 97.9 °F (36.6 °C) (Oral)   Resp 20   Ht 4' 8\" (1.422 m)   Wt 73 lb 6.6 oz (33.3 kg)   SpO2 100%   BMI 16.46 kg/m²     General appearance:  mild acute distress, appears older than stated age  HEENT:   atraumatic, sclera anicteric, Conjunctivae clear. Neck: Supple,Trachea midline, no goiter  Respiratory:minimal accessory muscle usage, Normal respiratory effort. Clear to auscultation, bilaterally without wheezing  Cardiovascular:  Regular rate and rhythm, capillary refill 2 seconds. Vas-Cath present on the right side clean no erythema drainage or rash noted. Abdomen: Soft, non-tender, non-distended with normal bowel sounds. Musculoskeletal:  No clubbing, cyanosis. trace edema LE bilaterally. Skin: turgor normal.  No new rashes or lesions. Neurologic: Alert and oriented x2, no new focal sensory/motor deficits. Labs:     Recent Labs     11/15/21  1500   WBC 8.2   HGB 11.1*   HCT 35.2*   PLT 73*     Recent Labs     11/15/21  1500   *   K 4.5      CO2 19*   BUN 6*   CREATININE 2.0*   CALCIUM 7.5*     Recent Labs     11/15/21  1500   AST 85*   ALT 26   BILITOT 0.8   ALKPHOS 298*     No results for input(s): INR in the last 72 hours.   Recent Labs     11/15/21  1500   TROPONINI 0.04*       Urinalysis: Lab Results   Component Value Date    NITRU Negative 07/17/2020    WBCUA 21-50 07/17/2020    BACTERIA 3+ 07/17/2020    RBCUA  07/17/2020    BLOODU LARGE 07/17/2020    SPECGRAV 1.025 07/17/2020    GLUCOSEU Negative 07/17/2020       Radiology:     CXR: I have reviewed the CXR with the following interpretation:   No acute process  EKG:  I have reviewed the EKG with the following interpretation:   Junctional rhythm QTc 476    CT ABDOMEN PELVIS WO CONTRAST Additional Contrast? None   Final Result   Mild colitis involving the ascending and proximal transverse colon and a   probable diffuse mild ileus extending to the rectum with no obvious bowel   obstruction. Mild ascites in the abdomen and pelvis is more prominent. There is mild   stranding in the mesentery throughout which is more prominent and may just be   due to the ascites or possible early inflammation vs infiltration of the   mesentery from other etiology. Recommend continued follow-up. Mild hepatosplenomegaly which is more prominent. Poor visualization of the gallbladder which is probably severely contracted   with associated cholelithiasis which is more prominent. Suggest ultrasound   correlation      Mildly atrophic kidneys which is more prominent with no hydronephrosis or   urinary obstruction      Previous hysterectomy with no pelvic mass or active inflammation in the pelvis      Tiny bibasilar pleural effusions which are less prominent      Small pericardial effusion which is more prominent         XR CHEST PORTABLE   Final Result   No radiographic evidence of acute pulmonary disease.          CT HEAD WO CONTRAST    (Results Pending)   CT CHEST WO CONTRAST    (Results Pending)       ASSESSMENT AND PLAN:    Active Hospital Problems    Diagnosis Date Noted    Shock [facility]) [R57.9] 11/15/2021     Acute encephalopathy:  Etiology unclear suspected metabolic  Blood cultures obtained, labs reviewed  CT head pending  Neurochecks    Shock: Etiology unclear at this time  Echocardiogram to rule out cardiac  Blood cultures obtained to rule out sepsis  Chest x-ray negative  SBP coverage  Empirically on IV doxycycline and Ceftriaxone to cover for SBP and PNA. Metronidazole given acute diarrhea w/cdiff rule out.   Continue to monitor    COVID 19 infection w/pericardeal effusion:  Echo limited pending  COVID 19 protocol initiated    Diarrhea:  Reported acute  Cdiff rule out  covid rule out    End-stage renal disease:  Nephrology consulted, much appreciated    Hypophos:  Replaced    Macrocytic anemia:  Suspected liver disease  B12 folate    Malnutrition:  Nutrition consultation    Seizure: continue home medication  Hypothyroidism: Continue home medication  GERD: Continue home medication  Hyperlipidemia: Continue home medication  Depression: continue home medications    Diet: NPO except meds ordered    DVT Prophylaxis: held    Dispo:   Expected LOS greater than two [address], DO

## 2021-11-16 NOTE — ED NOTES
PT RESTING IN BED AT THIS TIME , DENIES NEEDS, STATES \"I FEEL BETTER\".      [facility]  11/15/21 2029

## 2021-11-16 NOTE — CARE COORDINATION
Attempted to assess patient but she is having a test.  Attempted to reach contact (sister) listed in chart but no answer. Will attempt again at a later time.

## 2021-11-16 NOTE — ED PROVIDER NOTES
[facility]  ED  EMERGENCY DEPARTMENT ENCOUNTER      Pt Name: Deejay Taylor  MRN: [id number]  [place] 1950  Date of evaluation: 11/15/2021  Provider: Elina Saba MD    CHIEF COMPLAINT       Chief Complaint   Patient presents with    Hypotension     After dialysis patient sys 70's was given a liter of fluids then sys 100's         HISTORY OF PRESENT ILLNESS   (Location/Symptom, Timing/Onset, Context/Setting, Quality, Duration, Modifying Factors, Severity)  Note limiting factors. Deejay Taylor is a 80 y.o. female with past medical history of hypertension, seizure disorder, end-stage renal disease on hemodialysis here today for low blood pressure    Patient presents from dialysis where she was found to have a blood pressure in the 70s. She states she feels like she is dehydrated as she has had significant loose watery nonbloody stool over the course of the last week. Denies significant nausea or vomiting but did have 1 episode of emesis. She notes some mild abdominal distention but little abdominal pain. No fevers or chills. No known recent antibiotics. No chest pain, cough or shortness of breath. States she just feels tired. \[facility]\""    Nursing Notes were reviewed. REVIEW OF SYSTEMS    (2-9 systems for level 4, 10 or more for level 5)     Review of Systems    Please see HPI for pertinent positive and negative review of system findings. A full 10 system ROS was performed and otherwise negative.         PAST MEDICAL HISTORY     Past Medical History:   Diagnosis Date    Acute kidney failure ([address]     Anemia     Bradycardia     Cognitive communication deficit     Convulsions ([address]     Depressive disorder     ESRD (end stage renal disease) ([address]     Hemodialysis patient ([address]     Hx of blood clots     dvt lower legs    Hyperlipidemia     Hypertension     Muscle wasting     Osteomyelitis of lumbar spine ([address]     Pacemaker     biotronic verified 10/9/2020    Rhinitis, allergic  [facility]     Sepsis ([facility]     Thyroid disease          SURGICAL HISTORY       Past Surgical History:   Procedure Laterality Date    COLONOSCOPY N/A 7/22/2020    COLONOSCOPY WITH BIOPSY performed by Olya Avelar MD at [address] Left 9/17/2020    LEFT UPPER EXTREMITY DIALYSIS GRAFT performed by Tayo Hong MD at [address] IR NONTUNNELED VASCULAR CATHETER  7/24/2020    IR NONTUNNELED VASCULAR CATHETER 7/24/2020 [facility] SPECIAL PROCEDURES    IR TUNNELED CATHETER PLACEMENT GREATER THAN 5 YEARS  7/28/2020    IR TUNNELED CATHETER PLACEMENT GREATER THAN 5 YEARS 7/28/2020 [facility] PROCEDURES    PACEMAKER PLACEMENT      WA THROMBECTOMY,AV FISTULA DIALYS GRFT Left 9/21/2020    THROMBECTOMY, ANGIOGRAM AND REVISION USING DISTAL INFLOW OF DIALYSIS GRAFT performed by Tayo Hong MD at [facility] 7/22/2020    EGD BIOPSY performed by Olya Avelar MD at [address]       Previous Medications    ACETAMINOPHEN (TYLENOL) 500 MG TABLET    Take 500 mg by mouth every 4 hours as needed for Pain    ARIPIPRAZOLE (ABILIFY) 5 MG TABLET    Take 5 mg by mouth daily     ASPIRIN 81 MG EC TABLET    Take 81 mg by mouth daily    BISMUTH SUBSALICYLATE (PEPTO BISMOL) 525 MG/15ML SUSPENSION    Take 30 mLs by mouth every 8 hours as needed for Indigestion    DOCUSATE SODIUM (COLACE) 100 MG CAPSULE    Take 100 mg by mouth daily as needed     DOXYCYCLINE MONOHYDRATE (ADOXA) 100 MG TABLET    Take 100 mg by mouth 2 times daily    FLUDROCORTISONE (FLORINEF) 0.1 MG TABLET    Take 1 tablet by mouth daily    LACTOBACILLUS PO    Take by mouth    LEVETIRACETAM 250 MG TB3D    Take 500 mg by mouth 2 times daily     LEVOTHYROXINE (SYNTHROID) 50 MCG TABLET    Take 75 mcg by mouth Daily     MIDODRINE (PROAMATINE) 5 MG TABLET    Take 1 tablet by mouth as needed (HD: Give pre and mid tx for Sbp <100) MIRTAZAPINE (REMERON) 15 MG TABLET    Take 15 mg by mouth nightly    ONDANSETRON (ZOFRAN) 4 MG TABLET    Take 4 mg by mouth every 6 hours as needed for Nausea or Vomiting    PANTOPRAZOLE (PROTONIX) 40 MG TABLET    Take 40 mg by mouth daily    POTASSIUM CHLORIDE (KLOR-CON) 10 MEQ EXTENDED RELEASE TABLET    Take 10 mEq by mouth daily Monday Wednesday Friday dialysis days only    PRAVASTATIN (PRAVACHOL) 40 MG TABLET    Take 40 mg by mouth daily    PROBIOTIC PRODUCT (ACIDOPHILUS PROBIOTIC BLEND PO)    Take 1 tablet by mouth daily    RALOXIFENE (EVISTA) 60 MG TABLET    Take 60 mg by mouth daily    SENNA (SENOKOT) 8.6 MG TABLET    Take 1 tablet by mouth daily as needed for Constipation     SERTRALINE (ZOLOFT) 25 MG TABLET    Take 50 mg by mouth daily     SEVELAMER (RENVELA) 800 MG TABLET    Take 1 tablet by mouth 3 times daily (with meals)    VITAMIN C (ASCORBIC ACID) 500 MG TABLET    Take 500 mg by mouth daily       ALLERGIES     Penicillins    FAMILY HISTORY     No family history on file. SOCIAL HISTORY       Social History     Socioeconomic History    Marital status:      Spouse name: Not on file    Number of children: Not on file    Years of education: Not on file    Highest education level: Not on file   Occupational History    Not on file   Tobacco Use    Smoking status: Never Smoker    Smokeless tobacco: Never Used   Vaping Use    Vaping Use: Never used   Substance and Sexual Activity    Alcohol use: Not Currently    Drug use: Not Currently    Sexual activity: Not Currently   Other Topics Concern    Not on file   Social History Narrative    Not on file     Social Determinants of Health     Financial Resource Strain:     Difficulty of Paying Living Expenses: Not on file   Food Insecurity:     Worried About Running Out of Food in the Last Year: Not on file    Ramos of Food in the Last Year: Not on file   Transportation Needs:     Lack of Transportation (Medical):  Not on file    Lack of Transportation (Non-Medical): Not on file   Physical Activity:     Days of Exercise per Week: Not on file    Minutes of Exercise per Session: Not on file   Stress:     Feeling of Stress : Not on file   Social Connections:     Frequency of Communication with Friends and Family: Not on file    Frequency of Social Gatherings with Friends and Family: Not on file    Attends Yazdanism Services: Not on file    Active Member of [address] [place] [address] or Organizations: Not on file    Attends Club or Organization Meetings: Not on file    Marital Status: Not on file   Intimate Partner Violence:     Fear of Current or Ex-Partner: Not on file    Emotionally Abused: Not on file    Physically Abused: Not on file    Sexually Abused: Not on file   Housing Stability:     Unable to Pay for Housing in the Last Year: Not on file    Number of [place] in the Last Year: Not on file    Unstable Housing in the Last Year: Not on file       SCREENINGS               PHYSICAL EXAM    (up to 7 for level 4, 8 or more for level 5)     ED Triage Vitals [11/15/21 1432]   BP Temp Temp Source Pulse Resp SpO2 Height Weight   (!) 74/36 97.9 °F (36.6 °C) Oral 106 18 97 % 4' 8\" (1.422 m) 73 lb 6.6 oz (33.3 kg)       Physical Exam    General appearance:  Cooperative. Small frail  female is chronically ill-appearing  Skin:  Warm. Dry. Eye:  Extraocular movements intact. Ears, nose, mouth and throat:  Oral mucosa moist,  Neck:  Trachea midline. Heart: Tachycardic but regular  Perfusion:  intact  Respiratory:  Lungs clear to auscultation bilaterally. Respirations nonlabored. Abdominal: Distended abdomen with no overt tenderness to palpation  Neurological:  Alert and oriented x 3.   Moves all extremities spontaneously  Musculoskeletal:   Normal ROM, no deformities          Psychiatric:  Normal mood      DIAGNOSTIC RESULTS       Labs Reviewed   CBC WITH AUTO DIFFERENTIAL - Abnormal; Notable for the following components: Result Value    RBC 3.50 (*)     Hemoglobin 11.1 (*)     Hematocrit 35.2 (*)     .6 (*)     RDW 17.8 (*)     Platelets 73 (*)     All other components within normal limits    Narrative:     Performed at:  [address], [facility] [employer]   Phone [phone]   COMPREHENSIVE METABOLIC PANEL W/ REFLEX TO MG FOR LOW K - Abnormal; Notable for the following components:    Sodium 134 (*)     CO2 19 (*)     BUN 6 (*)     CREATININE 2.0 (*)     GFR Non- 25 (*)     GFR African American 30 (*)     Calcium 7.5 (*)     Total Protein 4.9 (*)     Albumin 2.2 (*)     Albumin/Globulin Ratio 0.8 (*)     Alkaline Phosphatase 298 (*)     AST 85 (*)     All other components within normal limits    Narrative:     Performed at:  [address], [facility] [employer]   Phone [phone]   TROPONIN - Abnormal; Notable for the following components:    Troponin 0.04 (*)     All other components within normal limits    Narrative:     Performed at:  [address], [facility] [employer]   Phone [phone]   LACTATE, SEPSIS - Abnormal; Notable for the following components:    Lactic Acid, Sepsis 2.6 (*)     All other components within normal limits    Narrative:     Performed at:  [address], [facility] [employer]   Phone [phone]   CULTURE, BLOOD 1   CULTURE, BLOOD 2   C DIFF TOXIN/ANTIGEN   TROPONIN   PHOSPHORUS   MAGNESIUM   AMMONIA   LACTIC ACID, PLASMA       Interpretation per the Radiologist below, if obtained/available at the time of this note:    CT ABDOMEN PELVIS WO CONTRAST Additional Contrast? None   Final Result   Mild colitis involving the ascending and proximal transverse colon and a   probable diffuse mild ileus extending to the rectum with no obvious bowel   obstruction.       Mild ascites in the abdomen and pelvis is more prominent. There is mild   stranding in the mesentery throughout which is more prominent and may just be   due to the ascites or possible early inflammation vs infiltration of the   mesentery from other etiology. Recommend continued follow-up. Mild hepatosplenomegaly which is more prominent. Poor visualization of the gallbladder which is probably severely contracted   with associated cholelithiasis which is more prominent. Suggest ultrasound   correlation      Mildly atrophic kidneys which is more prominent with no hydronephrosis or   urinary obstruction      Previous hysterectomy with no pelvic mass or active inflammation in the pelvis      Tiny bibasilar pleural effusions which are less prominent      Small pericardial effusion which is more prominent         XR CHEST PORTABLE   Final Result   No radiographic evidence of acute pulmonary disease. CT HEAD WO CONTRAST    (Results Pending)   CT CHEST WO CONTRAST    (Results Pending)       All other labs/imaging were within normal range or not returned as of this dictation. EMERGENCY DEPARTMENT COURSE and DIFFERENTIAL DIAGNOSIS/MDM:   Vitals:    Vitals:    11/15/21 1911 11/15/21 1930 11/15/21 2000 11/15/21 2015   BP: (!) 80/56 (!) 86/50 (!) 75/54 (!) 81/58   Pulse: 80 79 74 75   Resp: 17 21 17 17   Temp:       TempSrc:       SpO2: 99% 98% 100% 100%   Weight:       Height:           Patient presents emergency department today for reported hypotension at dialysis. Was somewhat hypotensive here as well with a blood pressure in the 70s. Distended abdomen no reports of diarrhea. Laboratory work-up does show leukocytosis with lactic acidosis concerning for underlying infection. Slightly elevated troponin appears chronic. Given IV fluids blood pressure went from the 70s to the 90s. CT scan performed shows evidence of colitis mild ascites in the abdomen with evidence of abdominal ileus. No emesis here.   Has responded to

## 2021-11-17 NOTE — DISCHARGE INSTR - COC
Continuity of Care Form    Patient Name: Ana Maria Starks   :  1950  MRN:  [id number]    Admit date:  11/15/2021  Discharge date:  ***    Code Status Order: Full Code   Advance Directives:      Admitting Physician:  Colette Ahmadi DO  PCP: Laurel Erazo MD    Discharging Nurse: [facility] Unit/Room#: 8168/3079-96  Discharging Unit Phone Number: ***    Emergency Contact:   Extended Emergency Contact Information  Primary Emergency Contact: caleb menchaca  Home Phone: [phone]  Mobile Phone: [phone]  Relation: Brother/Sister    Past Surgical History:  Past Surgical History:   Procedure Laterality Date    COLONOSCOPY N/A 2020    COLONOSCOPY WITH BIOPSY performed by Enrike Tamayo MD at [address] 2020    LEFT UPPER EXTREMITY DIALYSIS GRAFT performed by Mor Lombardo MD at [address] [place] [address] NONTUNNELED VASCULAR CATHETER  2020    IR NONTUNNELED VASCULAR CATHETER 2020 [facility] SPECIAL PROCEDURES    IR TUNNELED CATHETER PLACEMENT GREATER THAN 5 YEARS  2020    IR TUNNELED CATHETER PLACEMENT GREATER THAN 5 YEARS 2020 [facility] SPECIAL PROCEDURES    PACEMAKER PLACEMENT      AR THROMBECTOMY,AV FISTULA DIALYS  [facility] 2020    THROMBECTOMY, ANGIOGRAM AND REVISION USING DISTAL INFLOW OF DIALYSIS GRAFT performed by Mor Lombardo MD at [address] [place] [address] 2020    EGD BIOPSY performed by Enrike Tamayo MD at [address] [place] [address]       Immunization History:   Immunization History   Administered Date(s) Administered    COVID-19, RONAL Sumner, 30mcg/0.3mL 2020, 2021       Active Problems:  Patient Active Problem List   Diagnosis Code    JEN (acute kidney injury) ([facility] N17.9    Seizures (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    Syncope and collapse R55    Chest pain R07.9    Pacemaker Z95.0    Hypotension due to hypovolemia I95.89, E86.1    Shock ([address] R57.9       Isolation/Infection:   Isolation            Droplet Plus  C Diff Contact          Patient Infection Status       Infection Onset Added Last Indicated Last Indicated By Review Planned Expiration Resolved Resolved By    COVID-19 11/16/21 11/16/21 11/16/21 COVID-19, Rapid 11/23/21 11/30/21      C-diff Rule Out 11/15/21 11/15/21 11/15/21 Clostridium difficile toxin/antigen (Ordered)        Resolved    COVID-19 (Rule Out) 11/16/21 11/16/21 11/16/21 COVID-19, Rapid (Ordered)   11/16/21 Rule-Out Test Resulted    COVID-19 (Rule Out) 08/18/21 08/18/21 08/18/21 COVID-19, Rapid (Ordered)   08/18/21 Rule-Out Test Resulted    COVID-19 (Rule Out) 10/09/20 10/09/20 10/09/20 COVID-19 (Ordered)   10/09/20 Rule-Out Test Resulted    COVID-19 (Rule Out) 09/21/20 09/21/20 09/21/20 COVID-19 (Ordered)   09/21/20 Rule-Out Test Resulted    C-diff Rule Out 07/18/20 07/18/20 07/19/20 Clostridium Difficile Toxin/Antigen (Ordered)   07/19/20 Rule-Out Test Resulted    COVID-19 (Rule Out) 07/17/20 07/17/20 07/17/20 COVID-19 (Ordered)   07/20/20 Rule-Out Test Resulted            Nurse Assessment:  Last Vital Signs: BP 91/60   Pulse 88   Temp 97.5 °F (36.4 °C) (Oral)   Resp 16   Ht 4' 8\" (1.422 m)   Wt 81 lb 5.6 oz (36.9 kg)   SpO2 93%   BMI 18.24 kg/m²     Last documented pain score (0-10 scale): Pain Level: 0  Last Weight:   Wt Readings from Last 1 Encounters:   11/17/21 81 lb 5.6 oz (36.9 kg)     Mental Status:  {IP PT MENTAL STATUS:20030}    IV Access:  {[facility] IV ACCESS:084941485}    Nursing Mobility/ADLs:  Walking   {P DME NXPU:427176286}  Transfer  {P DME PWWU:641946940}  Bathing  {[facility] DME QABE:043793829}  Dressing  {[facility] DME RUXK:828635039}  Toileting {CHP DME IWUZ:549615949}  Feeding  {P DME BSZD:860316946}  Med Admin  {P DME [facility]:386117187}  Med Delivery   { EDIE MED Delivery:985094407}    Wound Care Documentation and Therapy:        Elimination:  Continence: Bowel: {YES / ZQ:80162}  Bladder: {YES / UI:09912}  Urinary Catheter: {Urinary Catheter:088847192}   Colostomy/Ileostomy/Ileal Conduit: {YES / RK:36570}       Date of Last BM: ***    Intake/Output Summary (Last 24 hours) at 2021 1027  Last data filed at 2021 0330  Gross per 24 hour   Intake 40 ml   Output 0 ml   Net 40 ml     I/O last 3 completed shifts:   In: 36 [P.O.:40]  Out: 0     Safety Concerns:     [address] Safety Concerns:487007338}    Impairments/Disabilities:      [address] Impairments/Disabilities:452913998}    Nutrition Therapy:  Current Nutrition Therapy:   [address] Diet List:762410443}    Routes of Feeding: {P DME Other Feedings:427264497}  Liquids: {Slp liquid thickness:26119}  Daily Fluid Restriction: {P DME Yes amt example:505586476}  Last Modified Barium Swallow with Video (Video Swallowing Test): {Done Not Done NQXD:509860601}    Treatments at the Time of Hospital Discharge:   Respiratory Treatments: ***  Oxygen Therapy:  {Therapy; copd oxygen:83476}  Ventilator:    { CC Vent [facility]:085770524}    Rehab Therapies: {THERAPEUTIC INTERVENTION:4499518754}  Weight Bearing Status/Restrictions: [address] [employer] Weight Bearin}  Other Medical Equipment (for information only, NOT a DME order):  {EQUIPMENT:158693775}  Other Treatments: ***    Patient's personal belongings (please select all that are sent with patient):  {[facility] DME Belongings:413429799}    RN SIGNATURE:  {Esignature:624381269}    CASE MANAGEMENT/SOCIAL WORK SECTION    Inpatient Status Date: ***    Readmission Risk Assessment Score:  Readmission Risk              Risk of Unplanned Readmission:  28           Discharging to Facility/ Agency   Name: PATIENTS' [address],[facility] [address] [facility] [address] [address]         Phone: [phone]       Fax: [phone]            / signature: Electronically signed by JARON Suarez on 11/17/21 at 10:51 AM EST    PHYSICIAN SECTION    Prognosis: Good    Condition at Discharge: Stable    Rehab Potential (if transferring to Rehab): Good    Recommended Labs or Other Treatments After Discharge: Follow up with PCP within 1-2 weeks. Incidental findings on CT chest, will need f/u with PCP: lymph nodes will need another CT with IV contrast in 3 months, nodule will need another CT chest without contrast in 6-12 months. Alternatively, a PCP might decided that a cancer diagnosis would be less relevant at this point. Physician Certification: I certify the above information and transfer of Deejay Taylor  is necessary for the continuing treatment of the diagnosis listed and that she requires Intermediate Nursing Care for less 30 days.      Update Admission H&P: No change in H&P    PHYSICIAN SIGNATURE:  Electronically signed by Gildardo Krueger MD on 11/17/21 at 10:27 AM EST

## 2021-11-17 NOTE — CONSULTS
Thank you to requesting provider: Dr. Apoorva Alaniz, for asking us to see Ashleigh Ruano  Reason for consultation:  ESRD  Chief Complaint:  Low blood pressure     History of Presenting Illness        79 y/o female with history of ESRD on dialysis with a [facility] admitted for low blood pressure. Aside from chronic back pain, she seems to feel at her baseline. No infectious symptoms and she has been sent to the ER twice from [facility] care facility with a negative work up. She is very frail. She does not seem symptomatic with low BP. She does get anxious due to back pain. No fevers and not severely anemic.           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)     Syncope and collapse 10/09/2020    Chest pain 08/17/2021    Pacemaker 08/17/2021    Hypotension due to hypovolemia 10/21/2021     Resolved Ambulatory Problems     Diagnosis Date Noted    No Resolved Ambulatory Problems     Past Medical History:   Diagnosis Date    Acute kidney failure (HCC)     Bradycardia     Cognitive communication deficit     Convulsions ([address]     Depressive disorder     ESRD (end stage renal disease) ([address]     Hemodialysis patient ([address]     Hx of blood clots     Hyperlipidemia     Hypertension     Muscle wasting     Osteomyelitis of lumbar spine (HCC)     Rhinitis, allergic     Seizure ([address]     Sepsis ([address]     Thyroid disease          Review of Systems     Constitutional:  Coming into dialysis below target, no fever/chills  Eyes:  No eye pain, no eye redness  Cardiovascular: No chest pain, no worsening of edema  Respiratory:  No hemoptysis, no stridor  Gastrointestinal:  No blood in stool, no n/v, no diarrhea  Genitoruinary:  No hematuria, not making urine   Musculoskeletal:  No joint swelling, no redness  Integumentary:  No Rash, no itching  Neurological:  No focal weakness, No new sensory deficit  Psychiatric:  No depression, no confusion  Endocrine:  No polyuria, no polydipsia       Medications      Reviewed in EMR     Allergies     Penicillins      Family History       Negative for Kidney Disease    Social History      Social History     Socioeconomic History    Marital status:      Spouse name: Not on file    Number of children: Not on file    Years of education: Not on file    Highest education level: Not on file   Occupational History    Not on file   Tobacco Use    Smoking status: Never Smoker    Smokeless tobacco: Never Used   Vaping Use    Vaping Use: Never used   Substance and Sexual Activity    Alcohol use: Not Currently    Drug use: Not Currently    Sexual activity: Not Currently   Other Topics Concern    Not on file   Social History Narrative    Not on file     Social Determinants of Health     Financial Resource Strain:     Difficulty of Paying Living Expenses: Not on file   Food Insecurity:     Worried About Running Out of Food in the Last Year: Not on file    Ramos of Food in the Last Year: Not on file   Transportation Needs:     Lack of Transportation (Medical): Not on file    Lack of Transportation (Non-Medical):  Not on file   Physical Activity:     Days of Exercise per Week: Not on file    Minutes of Exercise per Session: Not on file   Stress:     Feeling of Stress : Not on file   Social Connections:     Frequency of Communication with Friends and Family: Not on file    Frequency of Social Gatherings with Friends and Family: Not on file    Attends [employer] Services: Not on file    Active Member of Clubs or Organizations: Not on file    Attends Club or Organization Meetings: Not on file    Marital Status: Not on file   Intimate Partner Violence:     Fear of Current or Ex-Partner: Not on file    Emotionally Abused: Not on file    Physically Abused: Not on file    Sexually Abused: Not on file   Housing Stability:     Unable to Pay for Housing in the Last Year: Not on file    Number of Jonnie in the Last Year: Not on file    Unstable Housing in the Last Year: Not on file       Physical Exam     Blood pressure 91/63, pulse 85, temperature 97.7 °F (36.5 °C), resp. rate 16, height 4' 8\" (1.422 m), weight 80 lb 4 oz (36.4 kg), SpO2 93 %. General:  NAD, Ill appearing, frail   HEENT:  PERRL, EOMI  Neck:  Supple, normal range of movement  Chest:  No wheezing, on supplemental oxygen   CV:  Regular, no rub   Abdomen:  NTND, soft, +BS, no hepatosplenomegaly  Extremities:  No peripheral edema  Neurological:  Moving all four extremities, CN II-XII grossly intact  Lymphatics:  No palpable lymph nodes  Skin:  No rash, no jaundice  Psychiatric:  Alert, anxious affect  Access:  Right [facility]     Data     Recent Labs     11/15/21  1500 11/16/21  0459 11/17/21  0526   WBC 8.2  --  8.5   HGB 11.1*  --  11.0*   HCT 35.2* 32.4* 34.0*   .6*  --  98.8   PLT 73*  --  78*     Recent Labs     11/15/21  1500 11/15/21  2101 11/17/21  0526   *  --  144   K 4.5  --  3.3*     --  113*   CO2 19*  --  14*   GLUCOSE 80  --  57*   PHOS  --  1.6*  --    MG  --  2.60* 1.50*   BUN 6*  --  16   CREATININE 2.0*  --  4.2*   LABGLOM 25*  --  10*   GFRAA 30*  --  13*       Assessment:    ESRD:  On HD MWF with a working [facility]     Low Blood Pressure:  Unclear, I think she chronically runs low but not very symptomatic. Has been sent to the hospital multiple times with no clear etiology. Limited UF in outpatient dialysis treatments.   Cortisol was 16 but this was not a morning cortisol     Anemia of chronic disease-CKD:  At target COVID:  Patient did have vaccination, she is not significantly symptomatic     Plan:    HD today   Avoid UF  Ok with discharge  Will monitor at dialysis unit and limit fluid removal  Might need to start midodrine TID with additional dose for dialysis     -----------------------------  Joycie Hatchet, M.D.   [facility]

## 2021-11-17 NOTE — CARE COORDINATION
CASE MANAGEMENT INITIAL ASSESSMENT      Reviewed chart and completed assessment with sister, Malini Collins via phone  Explained Case Management role/services. yes    Primary contact information:Maci, [phone] sister/A    Health Care Decision Maker :   Primary Decision Maker: Yobani Peralta - Brother/Sister - [phone]          Can this person be reached and be able to respond quickly, such as within a few minutes or hours? Yes      Admit date/status:11/15/21  Diagnosis:COVID+   Is this a Readmission?:  No      Insurance:Jaxson escalera Dual  Precert required for SNF: Yes       3 night stay required: No    Living arrangements, Adls, care needs, prior to admission:LTC at [facility] at home:  Walker__Cane__RTS__ BSC__Shower Chair__  02__ HHN__ CPAP__  BiPap__  Hospital Bed__ W/C___ Other___provided by facility_______    Services in the home and/or outpatient, prior to [address] (if applicable)   · Name:  · Address:  · Dialysis Schedule:  · Phone:  · Fax:    PT/OT recs:    Hospital Exemption Notification (HEN):    Barriers to discharge:medical complications    Plan/comments:Referred to patient for d/c planning. Spoke to sister via phone. Patient is a 80year old female admitted for COVID+. Sister notified of admission. Facilitated sister Malini Collins, speaking to RN for information. Per sister patient is from [address] [place] [address] at [facility]. Plan is for patient to return on d/c. Call placed to facility. Message left for admissions. Await response. No other needs at this time. Electronically signed by JARON Claros LISW-S on 11/17/2021 at 10:37 AM    Received return response from francisco Conley is [place] at [phone]. [facility] updated. Patient was scheduled for d/c pending BP after HD. Per RN, d/c cancelled due to BP. Facility updated. No other needs at this time. Electronically signed by JARON Claros LISW-S on 11/17/2021 at 3:47 PM   ECOC on chart for MD signature

## 2021-11-17 NOTE — DISCHARGE SUMMARY
Hospital Medicine PROGRESS NOTE  Hypotensive again with HD    Patient ID: Patrice Singh      Patient's PCP: Crystal Munoz MD    Admit Date: 11/15/2021     Discharge Date:   11/17/21     Admitting Physician: Arlene Osborne DO     Discharge Physician: Crystal Lyn MD     Discharge Diagnoses: Active Hospital Problems    Diagnosis     Shock [facility]) [R57.9]        The patient was seen and examined on day of discharge and this discharge summary is in conjunction with any daily progress note from day of discharge. Hospital Course:  \"71 y.o. female who presented to [facility] with past medical history of depression disorder, unstable disease on dialysis, hyperlipidemia, hypertension, seizure, sepsis, hypothyroidism presented to the ED due to low blood pressure during her outpatient dialysis session. \"        COVID-19, without radiographic evidence of pneumonia, also no respiratory symptoms or hypoxia. - supportive care. She completed her second Pfizer vaccine in 1/2021. She did well here, felt fine.     Hypotension. Clinically this infection wasn't severe enough to call this septic shock. Hypotension more related to the fact that she is a frail 80year-old dialysis patient who weighs 76 lbs. Her ECF says her SBP is usually in the 100's. BP improved with IVFs, also gave a dose of midodrine. Could use midodrine with future HD sessions if needed.      Diarrhea. improved with supportive care. I see that a c.diff is pending at the time of this note.     ESRD on HD MWF. Nephrology consulted.      Incidental findings on CT chest, will need f/u with PCP: lymph nodes will need another CT with IV contrast in 3 months, nodule will need another CT chest without contrast in 6-12 months. Alternatively, a PCP might decided that a cancer diagnosis would be less relevant at this point.            Physical Exam Performed:     BP 91/60   Pulse 88   Temp 97.5 °F (36.4 °C) (Oral)   Resp 16   Ht 4' 8\" (1.422 m)   Wt 81 lb 5.6 oz (36.9 kg)   SpO2 93%   BMI 18.24 kg/m²       General appearance: No apparent distress, appears stated age and cooperative. HEENT: Pupils equal, round, and reactive to light. Conjunctivae/corneas clear. Neck: Supple, with full range of motion. No jugular venous distention. Trachea midline. Respiratory:  Normal respiratory effort. Clear to auscultation, bilaterally without Rales/Wheezes/Rhonchi. Cardiovascular: Regular rate and rhythm with normal S1/S2 without murmurs, rubs or gallops. Abdomen: Soft, non-tender, non-distended with normal bowel sounds. Musculoskeletal: No clubbing, cyanosis or edema bilaterally. Full range of motion without deformity. Skin: Skin color, texture, turgor normal.  No rashes or lesions. Neurologic:  Neurovascularly intact without any focal sensory/motor deficits. Cranial nerves: II-XII intact, grossly non-focal.  Psychiatric: Alert and oriented, thought content appropriate, normal insight  Capillary Refill: Brisk,3 seconds, normal   Peripheral Pulses: +2 palpable, equal bilaterally       Labs: For convenience and continuity at follow-up the following most recent labs are provided:      CBC:    Lab Results   Component Value Date    WBC 8.5 11/17/2021    HGB 11.0 11/17/2021    HCT 34.0 11/17/2021    PLT 78 11/17/2021       Renal:    Lab Results   Component Value Date     11/17/2021    K 3.3 11/17/2021     11/17/2021    CO2 14 11/17/2021    BUN 16 11/17/2021    CREATININE 4.2 11/17/2021    CALCIUM 7.7 11/17/2021    PHOS 1.6 11/15/2021         Significant Diagnostic Studies    Radiology:   CT CHEST WO CONTRAST   Final Result   1. Nonspecific small volume bilateral pleural effusion and bibasilar   relaxation atelectasis. Pleurisy is a consideration. 2. Nonspecific mild mediastinal and right hilar lymph node enlargement is   likely reactive. Consider IV contrast-enhanced CT chest follow-up in 3   months to ensure stability.    3. 8 mm ground glass pulmonary nodule lateral mid right lung. CT chest   surveillance recommended in 6-12 months. 4. Small volume pericardial effusion. Pericarditis is a consideration. 5. Small volume ascites. CT HEAD WO CONTRAST   Final Result   No acute intracranial abnormality. CT ABDOMEN PELVIS WO CONTRAST Additional Contrast? None   Final Result   Mild colitis involving the ascending and proximal transverse colon and a   probable diffuse mild ileus extending to the rectum with no obvious bowel   obstruction. Mild ascites in the abdomen and pelvis is more prominent. There is mild   stranding in the mesentery throughout which is more prominent and may just be   due to the ascites or possible early inflammation vs infiltration of the   mesentery from other etiology. Recommend continued follow-up. Mild hepatosplenomegaly which is more prominent. Poor visualization of the gallbladder which is probably severely contracted   with associated cholelithiasis which is more prominent. Suggest ultrasound   correlation      Mildly atrophic kidneys which is more prominent with no hydronephrosis or   urinary obstruction      Previous hysterectomy with no pelvic mass or active inflammation in the pelvis      Tiny bibasilar pleural effusions which are less prominent      Small pericardial effusion which is more prominent         XR CHEST PORTABLE   Final Result   No radiographic evidence of acute pulmonary disease. Consults:     IP CONSULT TO HOSPITALIST  IP CONSULT TO NEPHROLOGY    Disposition:  home     Condition at Discharge: Stable    Discharge Instructions/Follow-up:  Follow up with PCP within 1-2 weeks. Incidental findings on CT chest, will need f/u with PCP: lymph nodes will need another CT with IV contrast in 3 months, nodule will need another CT chest without contrast in 6-12 months.   Alternatively, a PCP might decided that a cancer diagnosis would be less relevant at this point.      Code Status:  Full Code     Activity: activity as tolerated    Diet: renal diet      Discharge Medications:     Current Discharge Medication List           Details   fludrocortisone (FLORINEF) 0.1 MG tablet Take 1 tablet by mouth daily  Qty: 30 tablet, Refills: 3    Associated Diagnoses: Hypotension, unspecified hypotension type      LACTOBACILLUS PO Take by mouth      sevelamer (RENVELA) 800 MG tablet Take 1 tablet by mouth 3 times daily (with meals)      pantoprazole (PROTONIX) 40 MG tablet Take 40 mg by mouth daily      Probiotic Product (ACIDOPHILUS PROBIOTIC BLEND PO) Take 1 tablet by mouth daily      mirtazapine (REMERON) 15 MG tablet Take 15 mg by mouth nightly      potassium chloride (KLOR-CON) 10 MEQ extended release tablet Take 10 mEq by mouth daily Monday Wednesday Friday dialysis days only       midodrine (PROAMATINE) 5 MG tablet Take 1 tablet by mouth as needed (HD: Give pre and mid tx for Sbp <100)  Qty: 90 tablet, Refills: 3      bismuth subsalicylate (PEPTO BISMOL) 525 MG/15ML suspension Take 30 mLs by mouth every 8 hours as needed for Indigestion      levothyroxine (SYNTHROID) 50 MCG tablet Take 75 mcg by mouth Daily       ARIPiprazole (ABILIFY) 5 MG tablet Take 5 mg by mouth daily       aspirin 81 MG EC tablet Take 81 mg by mouth daily      docusate sodium (COLACE) 100 MG capsule Take 100 mg by mouth daily as needed       levETIRAcetam 250 MG TB3D Take 500 mg by mouth 2 times daily       raloxifene (EVISTA) 60 MG tablet Take 60 mg by mouth daily      senna (SENOKOT) 8.6 MG tablet Take 1 tablet by mouth daily as needed for Constipation       vitamin C (ASCORBIC ACID) 500 MG tablet Take 500 mg by mouth daily      sertraline (ZOLOFT) 25 MG tablet Take 50 mg by mouth daily       pravastatin (PRAVACHOL) 40 MG tablet Take 40 mg by mouth daily      acetaminophen (TYLENOL) 500 MG tablet Take 500 mg by mouth every 4 hours as needed for Pain      ondansetron (ZOFRAN) 4 MG tablet Take 4 mg by mouth every 6 hours as needed for Nausea or Vomiting               Time Spent on discharge is more than 30 minutes in the examination, evaluation, counseling and review of medications and discharge plan. Signed:    José Miguel Vna MD   11/17/2021      Thank you John Neil MD for the opportunity to be involved in this patient's care. If you have any questions or concerns please feel free to contact me at [phone].

## 2021-11-17 NOTE — FLOWSHEET NOTE
Treatment time:3 hours  Net UF: 0 ml    Pre weight: 36.4 kg   Post weight: 36.7 kg  EDW: 33.3 kg    Access used: Right CVC IJ  Access function: Good with  ml/min    Medications or blood products given: none    Regular outpatient schedule: BREANN Cross)    Summary of response to treatment: Fair  Pt. noted to be hypotensive t/o treatment, asymptomatic, pt also with complaints of low back pain during treatment, repositioned as needed, Dr. Kathryn Ormond to room during treatment and made aware, no weight removal this treatment with fluids replaced, tolerated treatment fair.          11/17/21 1029 11/17/21 1359   Vital Signs   BP 91/63 (!) 130/39   Temp 97.7 °F (36.5 °C) 97.5 °F (36.4 °C)   Pulse 85 98   Resp 16 18   Weight 80 lb 4 oz (36.4 kg) 80 lb 14.5 oz (36.7 kg)   Percent Weight Change -1.36 0.82   Dry Weight 73 lb 6.6 oz (33.3 kg) 73 lb 6.6 oz (33.3 kg)

## 2021-11-18 NOTE — PROGRESS NOTES
[facility] arrived and is at bedside with patient. Pt asked to have non rebreather removed. Ativan given for agitation. Pt now resting more comfortably.

## 2021-11-18 NOTE — PROGRESS NOTES
Progress Note    HISTORY     CC:   Sent in for low BP, getting more hypoxic                We are following for ESRD      Subjective/   HPI:  Patient had dialysis yesterday with no fluid removal.  BP dropped overnight and she was given IV albumin. Today her oxygen requirements are going up. She is very frail     ROS:  Constitutional:  No fevers  Respiratory:  Worsening oxygenation   Skin:  No rash, no itching  :  No hematuria, no dysuria     Social Hx:  No Family at the bedside     Past Medical and Surgical History:  - Reviewed, no changes     EXAM       Objective/     Vitals:    11/18/21 0900 11/18/21 1157 11/18/21 1307 11/18/21 1449   BP: (!) 84/53 (!) 92/58  (!) 87/58   Pulse: 103 106  114   Resp:  21 22 28   Temp:  97.4 °F (36.3 °C)     TempSrc:  Oral     SpO2: 96% 90% 92% (!) 85%   Weight:       Height:         24HR INTAKE/OUTPUT:      Intake/Output Summary (Last 24 hours) at 11/18/2021 1450  Last data filed at 11/18/2021 0541  Gross per 24 hour   Intake 180 ml   Output 0 ml   Net 180 ml     Physical Exam Performed:     I performed an assessment from outside the patients room, based on new provisions and guidance offered by [facility] on March 18, 2020 in setting of COVID-19 outbreak and in order to preserve personal protective equipment in accordance with the flexibilities announced by CMS on March 30, 2020. References:   [email or web address]/Resources/COVID-[email or web address]/3_18%20Telemed%20Guidance%20Updated%20March%2018. pdf?[email or web address]   [email or web address] pdf      Bedside physical examination deferred.  However, I did visually inspect the patient and observed the following:      General appearance: Ill appearing, High flow NC      MEDICAL DECISION MAKING       Data/  Recent Labs     11/15/21  1500 11/16/21  0459 11/17/21  0526   WBC 8.2  --  8.5   HGB 11.1*  --  11.0*   HCT 35.2* 32.4* 34.0*   .6*  --  98.8 PLT 73*  --  78*     Recent Labs     11/15/21  1500 11/15/21  2101 11/17/21  0526   *  --  144   K 4.5  --  3.3*     --  113*   CO2 19*  --  14*   GLUCOSE 80  --  57*   PHOS  --  1.6*  --    MG  --  2.60* 1.50*   BUN 6*  --  16   CREATININE 2.0*  --  4.2*   LABGLOM 25*  --  10*   GFRAA 30*  --  13*       Assessment/     ESRD:  On HD MWF with a working [facility] at [address] Blood Pressure:  Unclear, I think she chronically runs low but not very symptomatic. Has been sent to the hospital multiple times with no clear etiology. Limited UF in outpatient dialysis treatments. Cortisol was 16 but this was not a morning cortisol      Anemia of chronic disease-CKD:  At target      COVID:  Patient did have vaccination. Over the last 24 hours she has significantly declined. She will not tolerate ultrafiltration.   She was DNR in the past    Plan/     Plan for dialysis tomorrow  Continue midodrine  Supportive care  Prognosis is poor   -----------------------------  Sorin Cox M.D.   [facility]

## 2021-11-18 NOTE — PROGRESS NOTES
11/18/21 1516   Oxygen Therapy/Pulse Ox   O2 Therapy Oxygen humidified   O2 Device High flow nasal cannula  (nrb)   Blood Gas  Performed? Yes   $ABG $ABG   Site #1 Right Brachial   Site Prepped #1 Yes   Number of Attempts #1 1  (1 abg rt radial)   Pressure Held #1 Yes   Complications #1 None   Post-procedure #1 Standard   Specimen Status #1 Other (Comment)  (epoch)   How Tolerated?  Tolerated well

## 2021-11-18 NOTE — CODE DOCUMENTATION
Assessment & Plan        Dizziness  Vague dizziness persists  May be related to some eustachian tube dysfunction. Discussed continued flonase, start neti pot saline washes, etc.   Referral to ENT for further eval given    unclear etiology with uncertain prognosis, requires further workup   Symptoms sound orthostatic in nature, but are somewhat vague. Will check BMP in the next few days to evaluate for electrolyte derangement. BP has not been significantly elevated and she has not had documented hypotension. I recommended checking BP  when she has an episode and once a day until she sees me. Her dizziness does not sound vertiginous--not suggestive of BPPV for example. I do wonder about possible ear effusion/ETD and allergies contributing to this vague sense of dizziness. She will try flonase (see below). Will check ears when she comes into clinic if sx not improving. Other possible contributors could be hydrochlorothiazide, uncontrolled DM (she has not been checking BG), increased stress recently.   - **Basic metabolic panel FUTURE anytime     Hypertension goal BP (blood pressure) < 140/90  Controlled. No changes today     Uncontrolled. BPs in the 140s systolic still at home  Discussed stopping hydrochlorothiazide (she has frequent urination when taking and would prefer different med) as I also wonder if it might be contributing to dizziness/lightheadedness. Given she will be traveling next week, opted to not change her hydrochlorothiazide right now, but rather check BMP to make sure no electrolyte abnormalities and continue hydrochlorothiazide until she returns, then will follow up with me in clinic and likely switch to lisinopril.      Type 2 diabetes mellitus without complication, without long-term current use of insulin (H)  controlled. A1c today is unchanged.     Has not been checking her BP. ?whether sx could be related to elevated BG, though she does not report other symptoms such as polyuria, polydipsia.  Pt O2 83% on NRB at 15L "  - blood glucose (NO BRAND SPECIFIED) test strip  Dispense: 100 strip; Refill: 3  - alcohol swab prep pads  Dispense: 100 each; Refill: 6  - blood glucose (NO BRAND SPECIFIED) lancets standard  Dispense: 100 each; Refill: 1     Chest pressure -- she never scheduled a stress test. I again encouraged her to do so.   Schedule stress test     Episode of exertional chest pressure 2 months ago, no recurrence. Given risk factors for cardiovascular disease, recommended further eval with stress test. Will plan on scheduling this when she returns from her trip and she will avoid significant exertion in the meantime and go to the ER if chest pain, shortness of breath occur.   - Exercise Stress Test - Adult        Persistent cough  Suspect GERD  Recommend adding famotidine, continue omeprazole. Follow up in a month at the time of her preventive visit.      Recommend scheduling preventive visit in a month        40 minutes spent on the date of the encounter doing chart review, history and exam, documentation and further activities per the note        Patient Instructions   You may try adding famotidine (Pepcid) to see if this helps with acid reflux and cough.         Return in about 3 months (around 9/21/2021) for Routine preventive.    Sheree Graf MD   [facility]    Delmi Pat is a 64 year old who presents for the following health issues     HPI       Duration: Began in March     Description   cough, plugged ears, dizziness, GERD, nausea     Severity: severe    Accompanying signs and symptoms: not able to hear well. Hearing \"white noise\".     History (predisposing factors):  none    Precipitating or alleviating factors: None    Therapies tried and outcome: fluticasone every morning, taking omeprazole daily     White noise in her ears, feels like she is always having to pop them. More bothersome is the cough.     Regarding the cough, she has tried to use the fluticasone daily. She has " "taken cough syrup. Cough drops a lot. Drinking water. It is sometimes a deep cough. Every time she eats she has a coughing spell not while she is eating but some times after. If she comes home from work and is leaning back in a chair she coughs. Wondered about acid reflux. She takes omeprazole daily.      Feels sometimes she feels a pressure mid chest from coughing. Gets sore in her back from coughing as well. Sometimes feels a moving, \"kneading\" pain in her mid back. Inhaler seems to help. Takes zyrtec at bedtime. These are things she was told by the pulmonologist. Saw pulmonology in 2019. Even when her  was alive (passed away in 2014) she had similar symptoms and saw pulmonology. Was told combo of allergies, sinus and acid reflux. Then she had lost her voice for a month.     Feels pressure in her ears all of the time. Hears \"white noise\". Wonders if fluid behind ears. Flonase did help. Dizziness is also better. But sx not resolved.     Diabetes Follow-up      How often are you checking your blood sugar? Trying to check every day for the past few weeks     What concerns do you have today about your diabetes? Dizziness      Do you have any of these symptoms? (Select all that apply) left foot pad feels swollen, Weight loss (more active)     Have you had a diabetic eye exam in the last 12 months? No    Patient checks her BP daily   BP this morning was: 135/71    BP Readings from Last 2 Encounters:   06/21/21 137/78   10/28/20 (!) 146/89     Hemoglobin A1C (%)   Date Value   06/21/2021 6.9 (H)   10/28/2020 6.9 (H)     LDL Cholesterol Calculated (mg/dL)   Date Value   10/28/2020 119 (H)   10/17/2019 122 (H)              June 11, 2021  Adilia Camacho  to Me          2:57 PM  Adriana Graf.  I wanted to follow up with a couple things,  our visit in March about the fluid in my ears and I am still having not the dizziness but they seem to still be giving me a problem feel plugged and thought that should be " looked at.   Second, I am having trouble as I do every few years with a cough I cant get rid of and am sure its the acid reflux issue.  Did have a cold about a month ago my 2 granddaughters were getting over a cold (they both were tested to go to  later no covid) I had my covid shot at the end of March at the [facility].   I get the cough always after I eat and i I have this acidity taste in my mouth  which is not pleasant,   and my voice is horse from it all.  Got my inhaler out although I may need a refill and continue to use the fluticasone to help with the ears/allergy.   Course everyone looks at you sideways when you cough these days.   I looked for an appointment but you don't have any till 7-5-2021 -  I am pretty much gone for over two weeks starting then. Wanted to know your thoughts on that and still need to probably set up an annual checkup for the meds check and some other things such as setting up mamogram, etc.      Review of Systems          Objective    /78   Pulse 69   Temp 98  F (36.7  C) (Oral)   Resp 18   Wt 86.6 kg (191 lb)   LMP 07/17/2012   SpO2 98%   BMI 35.50 kg/m    Body mass index is 35.5 kg/m .  Physical Exam   GENERAL: healthy, alert and no distress  Ears: normal canals and TMs bilaterally  Diabetic foot exam: normal DP and PT pulses, no trophic changes or ulcerative lesions and normal sensory exam    Orders Only on 03/12/2021   Component Date Value Ref Range Status     Sodium 03/12/2021 137  133 - 144 mmol/L Final     Potassium 03/12/2021 4.0  3.4 - 5.3 mmol/L Final     Chloride 03/12/2021 104  94 - 109 mmol/L Final     Carbon Dioxide 03/12/2021 29  20 - 32 mmol/L Final     Anion Gap 03/12/2021 4  3 - 14 mmol/L Final     Glucose 03/12/2021 123* 70 - 99 mg/dL Final     Urea Nitrogen 03/12/2021 13  7 - 30 mg/dL Final     Creatinine 03/12/2021 0.76  0.52 - 1.04 mg/dL Final     GFR Estimate 03/12/2021 83  >60 mL/min/[1.73_m2] Final    Comment: Non   GFR Calc  Starting 12/18/2018, serum creatinine based estimated GFR (eGFR) will be   calculated using the Chronic Kidney Disease Epidemiology Collaboration   (CKD-EPI) equation.       GFR Estimate If Black 03/12/2021 >90  >60 mL/min/[1.73_m2] Final    Comment:  GFR Calc  Starting 12/18/2018, serum creatinine based estimated GFR (eGFR) will be   calculated using the Chronic Kidney Disease Epidemiology Collaboration   (CKD-EPI) equation.       Calcium 03/12/2021 9.0  8.5 - 10.1 mg/dL Final

## 2021-11-18 NOTE — RT PROTOCOL NOTE
RT Inhaler-Nebulizer Bronchodilator Protocol Note    There is a bronchodilator order in the chart from a provider indicating to follow the RT Bronchodilator Protocol and there is an Initiate RT Inhaler-Nebulizer Bronchodilator Protocol order as well (see protocol at bottom of note). CXR Findings:  XR CHEST PORTABLE    Result Date: 11/18/2021  Multifocal airspace opacities suspected to represent developing pneumonia. The findings from the last RT Protocol Assessment were as follows:   History Pulmonary Disease: None or smoker <15 pack years  Respiratory Pattern: Mild dyspnea at rest, irregular pattern, or RR 21-25 bpm  Breath Sounds: Inspiratory and expiratory or bilateral wheezing and/or rhonchi  Cough: Strong, spontaneous, non-productive  Indication for Bronchodilator Therapy: Decreased or absent breath sounds  Bronchodilator Assessment Score: 10    Aerosolized bronchodilator medication orders have been revised according to the RT Inhaler-Nebulizer Bronchodilator Protocol below. Respiratory Therapist to perform RT Therapy Protocol Assessment initially then follow the protocol. Repeat RT Therapy Protocol Assessment PRN for score 0-3 or on second treatment, BID, and PRN for scores above 3. No Indications - adjust the frequency to every 6 hours PRN wheezing or bronchospasm, if no treatments needed after 48 hours then discontinue using Per Protocol order mode. If indication present, adjust the RT bronchodilator orders based on the Bronchodilator Assessment Score as indicated below. Use Inhaler orders unless patient has one or more of the following: on home nebulizer, not able to hold breath for 10 seconds, is not alert and oriented, cannot activate and use MDI correctly, or respiratory rate 25 breaths per minute or more, then use the equivalent nebulizer order(s) with same Frequency and PRN reasons based on the score.   If a patient is on this medication at home then do not decrease Frequency below that used at home. 0-3 - enter or revise RT bronchodilator order(s) to equivalent RT Bronchodilator order with Frequency of every 4 hours PRN for wheezing or increased work of breathing using Per Protocol order mode. 4-6 - enter or revise RT Bronchodilator order(s) to two equivalent RT bronchodilator orders with one order with BID Frequency and one order with Frequency of every 4 hours PRN wheezing or increased work of breathing using Per Protocol order mode. 7-10 - enter or revise RT Bronchodilator order(s) to two equivalent RT bronchodilator orders with one order with TID Frequency and one order with Frequency of every 4 hours PRN wheezing or increased work of breathing using Per Protocol order mode. 11-13 - enter or revise RT Bronchodilator order(s) to one equivalent RT bronchodilator order with QID Frequency and an Albuterol order with Frequency of every 4 hours PRN wheezing or increased work of breathing using Per Protocol order mode. Greater than 13 - enter or revise RT Bronchodilator order(s) to one equivalent RT bronchodilator order with every 4 hours Frequency and an Albuterol order with Frequency of every 2 hours PRN wheezing or increased work of breathing using Per Protocol order mode. RT to enter RT Home Evaluation for COPD & MDI Assessment order using Per Protocol order mode.     Electronically signed by Suanne Collet, RCP on 11/18/2021 at 11:07 AM

## 2021-11-18 NOTE — PROGRESS NOTES
Hospitalist Progress Note      PCP: Cyndy Tobin MD    Date of Admission: 11/15/2021    Chief Complaint: hypotension during outpatient HD       Subjective:  She had to be started on oxygen this morning. Complains of dyspnea, which is a new complaint for her, previously denied this. Still doesn't seem to be coughing. Pneumonia now present on CXR. Suspect that COVID is starting to affect her. Will start steroids. I don't hear rales and she doesn't have peripheral edema. Medications:  Reviewed    Infusion Medications    dextrose      sodium chloride       Scheduled Medications    albuterol  2.5 mg Nebulization Once    midodrine  10 mg Oral TID WC    sodium chloride flush  10 mL IntraVENous 2 times per day    Vitamin D  2,000 Units Oral Daily    pantoprazole  40 mg IntraVENous BID    levetiracetam  500 mg IntraVENous Q12H     PRN Meds: glucose, dextrose, glucagon (rDNA), dextrose, albuterol sulfate HFA, heparin (porcine), sodium chloride flush, sodium chloride, promethazine **OR** ondansetron, acetaminophen **OR** acetaminophen, guaiFENesin-dextromethorphan      Intake/Output Summary (Last 24 hours) at 11/18/2021 0924  Last data filed at 11/18/2021 0541  Gross per 24 hour   Intake 180 ml   Output 0 ml   Net 180 ml       Physical Exam Performed:    BP 93/66   Pulse 111   Temp 97.4 °F (36.3 °C) (Oral)   Resp 20   Ht 4' 8\" (1.422 m)   Wt 80 lb 0.4 oz (36.3 kg)   SpO2 90%   BMI 17.94 kg/m²     General appearance: No apparent distress, appears stated age and cooperative. HEENT: Pupils equal, round, and reactive to light. Conjunctivae/corneas clear. Neck: Supple, with full range of motion. No jugular venous distention. Trachea midline. Respiratory:  Normal respiratory effort. Clear to auscultation, bilaterally without Rales/Wheezes/Rhonchi. Cardiovascular: Regular rate and rhythm with normal S1/S2 without murmurs, rubs or gallops.   Abdomen: Soft, non-tender, non-distended with normal bowel sounds. Musculoskeletal: No clubbing, cyanosis or edema bilaterally. Full range of motion without deformity. Skin: Skin color, texture, turgor normal.  No rashes or lesions. Neurologic:  Neurovascularly intact without any focal sensory/motor deficits. Cranial nerves: II-XII intact, grossly non-focal.  Psychiatric: Alert and partially oriented, significant cognitive slowing, limited insight  Capillary Refill: Brisk,3 seconds, normal   Peripheral Pulses: +2 palpable, equal bilaterally       Labs:   Recent Labs     11/15/21  1500 11/16/21  0459 11/17/21  0526   WBC 8.2  --  8.5   HGB 11.1*  --  11.0*   HCT 35.2* 32.4* 34.0*   PLT 73*  --  78*     Recent Labs     11/15/21  1500 11/15/21  2101 11/17/21  0526   *  --  144   K 4.5  --  3.3*     --  113*   CO2 19*  --  14*   BUN 6*  --  16   CREATININE 2.0*  --  4.2*   CALCIUM 7.5*  --  7.7*   PHOS  --  1.6*  --      Recent Labs     11/15/21  1500   AST 85*   ALT 26   BILITOT 0.8   ALKPHOS 298*     No results for input(s): INR in the last 72 hours. Recent Labs     11/15/21  1500 11/15/21  2101   TROPONINI 0.04* 0.04*       Urinalysis:      Lab Results   Component Value Date    NITRU Negative 07/17/2020    WBCUA 21-50 07/17/2020    BACTERIA 3+ 07/17/2020    RBCUA  07/17/2020    BLOODU LARGE 07/17/2020    SPECGRAV 1.025 07/17/2020    GLUCOSEU Negative 07/17/2020       Radiology:  XR CHEST PORTABLE   Final Result   Multifocal airspace opacities suspected to represent developing pneumonia. CT CHEST WO CONTRAST   Final Result   1. Nonspecific small volume bilateral pleural effusion and bibasilar   relaxation atelectasis. Pleurisy is a consideration. 2. Nonspecific mild mediastinal and right hilar lymph node enlargement is   likely reactive. Consider IV contrast-enhanced CT chest follow-up in 3   months to ensure stability. 3. 8 mm ground glass pulmonary nodule lateral mid right lung.   CT chest   surveillance recommended in 6-12 months. 4. Small volume pericardial effusion. Pericarditis is a consideration. 5. Small volume ascites. CT HEAD WO CONTRAST   Final Result   No acute intracranial abnormality. CT ABDOMEN PELVIS WO CONTRAST Additional Contrast? None   Final Result   Mild colitis involving the ascending and proximal transverse colon and a   probable diffuse mild ileus extending to the rectum with no obvious bowel   obstruction. Mild ascites in the abdomen and pelvis is more prominent. There is mild   stranding in the mesentery throughout which is more prominent and may just be   due to the ascites or possible early inflammation vs infiltration of the   mesentery from other etiology. Recommend continued follow-up. Mild hepatosplenomegaly which is more prominent. Poor visualization of the gallbladder which is probably severely contracted   with associated cholelithiasis which is more prominent. Suggest ultrasound   correlation      Mildly atrophic kidneys which is more prominent with no hydronephrosis or   urinary obstruction      Previous hysterectomy with no pelvic mass or active inflammation in the pelvis      Tiny bibasilar pleural effusions which are less prominent      Small pericardial effusion which is more prominent         XR CHEST PORTABLE   Final Result   No radiographic evidence of acute pulmonary disease. Assessment/Plan:    Active Hospital Problems    Diagnosis     [facility]) [R57.9]        \"79 y.o. female who presented to [facility] with past medical history of depression disorder, unstable disease on dialysis, hyperlipidemia, hypertension, seizure, sepsis, hypothyroidism presented to the ED due to low blood pressure during her outpatient dialysis session. \"      [id number]  - initially did not have radiographic evidence of pneumonia, also no respiratory symptoms or hypoxia.   Then she developed shortness of breath, hypoxia, and infiltrates on 11/18.    - started

## 2021-11-18 NOTE — CODE DOCUMENTATION
Pt A&O, Dr. Lott Napoleon at the bedside, getting clarification on DNR-CCA Pt agrees she does not want to be intubated

## 2021-11-18 NOTE — PROGRESS NOTES
Patient persistently hypoxic. Writhing, moaning, dusky. Starting comfort measures. Please allow Maci to visit to be here for end-of-life care.

## 2021-11-18 NOTE — PROGRESS NOTES
Rapid response. Hypoxia and looking acutely ill. Perhaps about 93% on 15L HFNC + 100% NRB. Unfortunately we usually don't get a good waveform. Trying to get an ABG but this has been unsuccessful so far. Patient complains of dyspnea. Patient confirmed DNR. She also said that Eb Schofield is the one that she wants making decisions for her. We were able to get a different number for Eb Schofield. Eb Schofield also confirmed DNR. Regarding CPR, defibs, pressors, BiPAP, intubation: \"Don't do any of that to her, she has been sick for a long time. \"     Plan for now is to give her as much oxygen as she needs via HFNC, HHFNC, +/- NRB. If this still isn't enough, and especially if she starts to significantly suffer, then Eb Schofield would like morphine and lorazepam to be given with palliative intent. She understands that the patient's prognosis is \"hour by hour\" for the time being. The patient is not stable for a CTPA at this point. Due to her thrombocytopenia and frail state, would be exceptionally high risk for bleeding events if we were to resort to empiric anticoagulation. The risk seems to outweigh the potential benefits, especially given her goals of care. She isn't coughing or having fevers. No leukocytosis. No indication for antibacterials at this point. Procalcitonin would not be informative in the setting of ESRD. Will continue to monitor closely.

## 2021-11-18 NOTE — PROGRESS NOTES
CXR shows pneumonia, presumably COVID infiltrates. Oxygen requirement escalating quickly. There don't seem to be other therapeutic options (assuming she does not have a PE). The patient does not have capacity to make code status decisions. Discussed with nursing. Her only listed contact is her sister Miles. I called multiple times without answer and without opportunity to leave a voicemail. I see that in 8/2021 the patient's DNR form was uploaded into Epic. We will honor these wishes. DNR-CCA for now.

## 2021-11-19 LAB
BLOOD CULTURE, ROUTINE: NORMAL
CULTURE, BLOOD 2: NORMAL

## 2021-11-19 NOTE — PROGRESS NOTES
Pages sent to physician:    11/18/21 7:45 AM   [address] [place] [address] or Facility: [facility] From: Flores Núñez RE: Austin Palomares 1950 RM: 200 Pt is complaining of shortness of breath, was 88% on 2L NC. Turned up to 4 L, rhonchi throughout. Can we get a PRN breathing tx ordered? Need Callback: NO CALLBACK REQ C4 PROGRESSIVE CARE    11/18/21 1:13 PM   [address] [place] [address] or Facility: [facility] From: Flores Núñez RE: Austin Palomares 1950 RM: 429 FYI oxygen needs and work of breathing have gone up. She is currently on 15L HFNC with 92% saturation. Her color when she took her oxygen off for a few mins was extremely dusky but couldn't get a saturation due to her hands being cold. Need Callback: NO CALLBACK REQ    11/18/21 3:41 PM   [address] [place] [address] or Facility: [facility] From: Flores Núñez RE: Austin Palomares 1950 RM: 429 Can we get something for pain? She is very restless, unable to get a pulse ox on her, respirations still high and heart rate in the 120's. Please call.  Thanks

## 2021-11-19 NOTE — PROGRESS NOTES
Physician Progress Note      PATIENT:               Teo Chery  CSN #:                  [id number]  :                       1950  ADMIT DATE:       11/15/2021 2:47 PM  [address] [place] DATE:        2021 11:16 PM  RESPONDING  PROVIDER #:        Sindi Stewart MD          QUERY TEXT:    Pt admitted with covid. Pt noted to be persistently hypoxic . If possible,   please document in the progress notes and discharge summary if you are   evaluating and/or treating any of the following: The medical record reflects the following:  Risk Factors: Covid, ESRD, metabolic encephalopathy  Clinical Indicators: Per progress note  \"Patient persistently hypoxic. Writhing, moaning, dusky. Starting comfort measures\". Pulse ox 85% on high   flow, RR 28, complaints of SOB  Treatment: Supplemental oxygen, comfort care, serial labs. Thank you,  [email or web address]  Options provided:  -- Acute respiratory failure with hypoxia  -- Acute respiratory failure with hypercapnia  -- Acute respiratory failure with hypoxia and hypercapnia  -- Other - I will add my own diagnosis  -- Disagree - Not applicable / Not valid  -- Disagree - Clinically unable to determine / Unknown  -- Refer to Clinical Documentation Reviewer    PROVIDER RESPONSE TEXT:    This patient is in acute respiratory failure with hypoxia.     Query created by: Marissa Dwyer on 2021 4:18 PM      Electronically signed by:  Sindi Stewart MD 2021 12:15 PM

## 2021-11-19 NOTE — DISCHARGE SUMMARY
Hospital Medicine Discharge Summary    Patient ID: Migue Catherine      Patient's PCP: Pietro Espinosa MD    Admitting Physician: Андрей Sheldon DO  Discharge Physician: Ananya Lai MD     Admit Date: 11/15/2021     Disposition:     Discharge Diagnoses: Active Hospital Problems    Diagnosis     Shock [facility]) [R57.9]        Date of Death: 21  Time of Death: there is no documentation from last night. I spoke with nursing. I suspect that this patient  at 2100. Immediate Cause of Death: COVID-19  Underlying Conditions: Other Contributing Conditions:      Hospital Course:  Frail elderly woman on HD presented with hypotension. Found to have [place]. Rapid-onset acute hypoxic respiratory failure. Continued to decline despite aggressive care. Family wanted comfort measures. She passed away in comfort. ESRD. Was on HD. Lung nodule. No longer relevant. Consults:  IP CONSULT TO HOSPITALIST  IP CONSULT TO NEPHROLOGY    Signed:  Ananya Lai MD MD   2021    Thank you Pietro Espinosa MD for the opportunity to be involved in this patient's care. If you have any questions or concerns please feel free to contact me at [phone].

## 2021-11-20 NOTE — PROGRESS NOTES
@ 2130 pt's sister, Frank Gomez, asked to remove pt's oxygen. This RN gave prn morphine per [facility] to make pt more comfortable. Pt passed and was verified by this RN and Deborah Harrison RN  @ [phone]. Emotional support provided to sister at bedside. Secure chat sent to Lidia Francois NP to make provider aware. [facility] released pt's body. End of life care provided to pt and pt transported to [facility] by security.

## 2022-08-25 NOTE — PROGRESS NOTES
CARDIOLOGY CONSULTATION        Patient Name: Tonny Thompson  Primary Care physician: Sunshine Duff MD    Reason for Referral/Chief Complaint: Tonny Thompson is a 80 y.o. patient who is referred to cardiology clinic today for evaluation and treatment of elavated troponin and syncope. History of Present Illness:   Tonny Thompson 80 y.o. female is here today as a new patient for cardiology evaluation. She was referred by Emergency Room for findings of elevated troponin and syncope. Cami Velasquez is a resident at [facility] [address] [place] [address]. She has a medical history including seizures, hypothyroidism, end stage renal disease requiring hemodialysis Monday/Wednesday/Friday, hypertension, and is status post permanent pacemaker of unknown type/unknown indication. The patient was seen in the ED with complaints of syncope on 09/30/21. Troponin was elevated . 05, .06. Noted elevated prior admission. Her EKG was notable for normal sinus rhythm with non-specific ST changes with no specific changes found in comparison to 08/18/21 EKG. Her last echocardiogram from 10/10/20 showed EF 55-65%, Grade I DD, and mild tricuspid regurgitation. Today she presents in wheelchair from nursing home, no caregiver available to corroborate history. She states she is having intermittent right sided \"sharp\" chest pain. Occurs at rest as she is not greatly active. She states twisting motion of her thorax and sitting up will worsen pain. No worse with deep breathing. She states she can walk short distances indoors and does not experience chest pain with this activity. It hurts worse with pressing on the catheter. Patient has noted subclavian dialysis CVC to Right upper chest w/ dry dressing in place. She points to her dialysis catheter site of her discomfort. She is having it presently. Patient declines fevers or chills. No redness or rash over the site. No expressible drainage.      Noted patient passed out leading to ER Labs are scheduled on 8/26/22.   visit 9/30/2021. She does not have much recollection of the event. It is reported that she was found in her wheelchair unconscious and was out for 3 to 4 minutes per discussion between ED provider and nurse at facility. No seizure-like activity documented. Patient states today that she had a subsequent episode yesterday. Last dialysis session was yesterday. Unsure how much fluid was removed. She denies loss of bowel or bladder continence with episodes. Reports she does have a history of seizures. She was not evaluated in the emergency department for episode that occurred yesterday. She states she does not have a good appetite at facility as she does not care for the food. Denies shortness of breath at rest and dyspnea with exertion. Denies palpitations. Denies paroxysmal nocturnal dyspnea, orthopnea, bendopnea, increasing lower extremity edema or weight gain. She is a patient of [facility], takes all medication as prescribed per staff facility documents. Medications were reviewed in full today with cardiac medications including baby aspirin daily as well as midodrine as needed on HD days. Past Medical History:   has a past medical history of Acute kidney failure ([address] Anemia, Bradycardia, Cognitive communication deficit, Convulsions ([address] Depressive disorder, ESRD (end stage renal disease) ([address] Hemodialysis patient ([address] Hx of blood clots, Hyperlipidemia, Hypertension, Muscle wasting, Osteomyelitis of lumbar spine ([address] Pacemaker, Rhinitis, allergic, Seizure ([address] Sepsis ([address] and Thyroid disease. Surgical History:   has a past surgical history that includes Upper gastrointestinal endoscopy (N/A, 7/22/2020); Colonoscopy (N/A, 7/22/2020); IR NONTUNNELED VASCULAR CATHETER > 5 YEARS (7/24/2020); IR TUNNELED CVC PLACE WO SQ PORT/PUMP > 5 YEARS (7/28/2020); Dialysis fistula creation (Left, 9/17/2020); pr thrombectomy,av fistula dialys grft (Left, 9/21/2020); and pacemaker placement. Social History:   reports that she has never smoked. She has never used smokeless tobacco. She reports previous alcohol use. She reports previous drug use. Family History:  Reports no history of early onset coronary artery disease, myocardial infarction, cerebrovascular accident or sudden cardiac death among primary relatives. Home Medications:  Were reviewed and are listed in nursing record and/or below  Prior to Admission medications    Medication Sig Start Date End Date Taking?  Authorizing Provider   doxycycline monohydrate (ADOXA) 100 MG tablet Take 100 mg by mouth 2 times daily   Yes Historical Provider, MD   LACTOBACILLUS PO Take by mouth   Yes Historical Provider, MD   sevelamer (RENVELA) 800 MG tablet Take 1 tablet by mouth 3 times daily (with meals)   Yes Historical Provider, MD   pantoprazole (PROTONIX) 40 MG tablet Take 40 mg by mouth daily   Yes Historical Provider, MD   mirtazapine (REMERON) 15 MG tablet Take 15 mg by mouth nightly   Yes Historical Provider, MD   midodrine (PROAMATINE) 5 MG tablet Take 1 tablet by mouth as needed (HD: Give pre and mid tx for Sbp <100)  Patient taking differently: Take 10 mg by mouth as needed (HD: Give pre and mid tx for Sbp <100)  7/28/20  Yes Kike Chapman MD   bismuth subsalicylate (PEPTO BISMOL) 525 MG/15ML suspension Take 30 mLs by mouth every 8 hours as needed for Indigestion   Yes Historical Provider, MD   levothyroxine (SYNTHROID) 50 MCG tablet Take 75 mcg by mouth Daily    Yes Historical Provider, MD   ARIPiprazole (ABILIFY) 5 MG tablet Take 5 mg by mouth daily    Yes Historical Provider, MD   aspirin 81 MG EC tablet Take 81 mg by mouth daily   Yes Historical Provider, MD   levETIRAcetam 250 MG TB3D Take 500 mg by mouth 2 times daily    Yes Historical Provider, MD   raloxifene (EVISTA) 60 MG tablet Take 60 mg by mouth daily   Yes Historical Provider, MD   vitamin C (ASCORBIC ACID) 500 MG tablet Take 500 mg by mouth daily   Yes Historical thyroid not enlarged, symmetric, no tenderness/mass/nodules, flat neck veins. Lungs:   Clear to auscultation bilaterally, no wheezes, no rales, no respiratory distress   Chest Wall:   Right chest central venous line/hemodialysis catheter with dressing, no expressible drainage or erythema. There is tenderness of the line on palpation and chest wall surrounding the line. Heart:  Regular rate and rhythm, normal S1, normal S2, no murmur, no rub, no S3/S4, PMI non-palpable. No RV heave. Abdomen:   Soft, non-tender, with normoactive bowel sounds. No masses, no hepatosplenomegaly   Extremities: No cyanosis, clubbing. Mild nonpitting edema. Vascular: 2+ radial, diminished dorsalis pedis and posterior tibial pulses bilaterally. Brisk carotid upstrokes without carotid bruit. Skin: Skin color, texture, turgor are normal with no rashes or ulceration. Pysch: Euthymic mood, appropriate affect   Neurologic: Oriented to person, place and time. No slurred speech or facial asymmetry. No motor or sensory deficits on gross examination.          Labs:   CBC:   Lab Results   Component Value Date    WBC 7.9 2021    RBC 3.66 2021    HGB 11.6 2021    HCT 37.2 2021    .7 2021    RDW 18.0 2021     2021     CMP:  Lab Results   Component Value Date     2021    K 4.4 2021     2021    CO2 24 2021    BUN 40 2021    CREATININE 4.7 2021    GFRAA 11 2021    AGRATIO 1.1 2021    LABGLOM 9 2021    GLUCOSE 118 2021    PROT 5.0 2021    CALCIUM 8.4 2021    BILITOT 0.5 2021    ALKPHOS 389 2021    AST 25 2021    ALT 14 2021     PT/INR:  No results found for: PTINR  HgBA1c:No results found for: LABA1C  Lab Results   Component Value Date    TROPONINI 0.06 (H) 2021         Cardiac Data:     EK21  Unusual P axis, possible ectopic atrial rhythmLow voltage QRS Nonspecific ST abnormality. When compared with ECG of 30-SEP-2021 15:39,No significant change was found     EKG 08/18/21  Normal sinus rhythmLeft axis deviationAbnormal ECGWhen compared with ECG of 17-AUG-2021 14:27,Sinus rhythm has replaced Electronic atrial pacemaker     Echo:10/10/20   Summary   Left ventricular systolic function is normal with ejection fraction   estimated at 55-65 %. No regional wall motion abnormalities are noted. Left ventricular size is decreased. Grade I diastolic dysfunction with normal filling pressure. Mild tricuspid regurgitation. Normal systolic pulmonary artery pressure (SPAP) estimated at 25 mmHg (RA   pressure 3 mmHg). Impression and Plan:      1. Noncardiac chest pain-suspect related to HD line. No signs or symptoms of lewis infection  2. History of syncope, recurrent  3. Status post permanent pacemaker, unknown type/indication  4. Seizure history  5. Low blood pressure, baseline 90/60s  6. 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 Chest pain is noncardiac by history. Would suggest blood cultures for any indication of infection. Defer to PCP/nephrology for this. 2.  Continue baby aspirin daily as there is reported history of angina per Dr. Encinas Members previous evaluation at [facility] 2/2020. Not presently on statin and her LDL is controlled, will not add at this time. 3.  Orthostatics today notable for hypotension at 65/50 with standing without concomitant rise in heart rate.   This is undoubtedly contributing to her syncopal episodes. This is likely driven by volume depletion with dialysis. Continue midodrine and defer to nephrology for volume management. 4. Will obtain transthoracic echocardiogram for evaluation of underlying cardiac structure and function. 5.  I did have the patient's device check today. This is a [employer]ron[employer], dual-chamber permanent pacemaker, implant reason unknown. Implanted in [place] previously. Last interrogation was late 2020. Since that time she had 38 high atrial rate episodes, one-to-one conduction, consistent with sinus versus atrial tachycardia. She has previous ectopic atrial rhythm which does raise suspicion for atrial tachycardia. Last episode reported is June. No significant events from yesterday nor 9/30/2021. This speaks against arrhythmogenic syncope. Do not feel possible atrial tachycardia warrants treatment at this time. 6.  I referred her to electrophysiology for pacemaker care moving forward. Follow up as needed pending echocardiogram results    Scribe's attestation: This note was scribed in the presence of Susie Wills by Brandie Ashley RN     The scribes documentation has been prepared under my direction and personally reviewed by me in its entirety. I confirm that the note above accurately reflects all work, treatment, procedures, and medical decision making performed by me. Bo Redd MD, personally performed the services described in this documentation as scribed by Brandie Ashley RN  in my presence, and it is both accurate and complete to the best of our ability. I will address the patient's cardiac risk factors and adjusted pharmacologic treatment as needed. In addition, I have reinforced the need for patient directed risk factor modification. All questions and concerns were addressed to the patient/family. Alternatives to my treatment were discussed.      Thank you for allowing us to participate in the care

## 2024-11-15 NOTE — PROGRESS NOTES
Bedside report given to [facility], RN. Care transferred. Dr. Gongora Dr. Gongora hospitalist

## 2025-05-05 NOTE — BRIEF OP NOTE
Brief Operative Note      Patient: Chaop Owusu MRN: [id number]     YOB: 1950  Age: 72 y.o. Sex: female        DATE OF PROCEDURE: 7/24/2020     PROCEDURE PERFORMED: Temporary dialysis catheter    SURGEON: Divya Padilla MD    ANESTHESIA: None. Local only    Estimated Blood Loss:none    Complications: None. Device implanted: 16 cm Dialysis catheter right IJ.            Specimen: none    Electronically signed by Divya Padilla MD on 7/24/2020 at 2:17 PM Resident/Fellow

## (undated) DEVICE — PUNCH AORT DIA4MM LNG HNDL

## (undated) DEVICE — Device

## (undated) DEVICE — ADHESIVE SKIN CLSR 0.7ML TOP DERMBND ADV

## (undated) DEVICE — CHECK-FLO HEMOSTASIS ASSEMBLY: Brand: CHECK-FLO

## (undated) DEVICE — INTENDED TO BE USED TO OCCLUDE, RETRACT AND IDENTIFY ARTERIES, VEINS, TENDONS AND NERVES IN SURGICAL PROCEDURES: Brand: STERION®  VESSEL LOOP

## (undated) DEVICE — SYRINGE MED 3ML CLR PLAS STD N CTRL LUERLOCK TIP DISP

## (undated) DEVICE — GLOVE SURG SZ 8 L11.77IN FNGR THK9.8MIL STRW LTX POLYMER

## (undated) DEVICE — SOLUTION IV IRRIG POUR BRL 0.9% SODIUM CHL 2F7124

## (undated) DEVICE — ENDO CARRY-ON PROCEDURE KIT INCLUDES SUCTION TUBING, LUBRICANT, GAUZE, BIOHAZARD STICKER, TRANSPORT PAD AND INTERCEPT BEDSIDE KIT.: Brand: ENDO CARRY-ON PROCEDURE KIT

## (undated) DEVICE — SNAP KAP: Brand: UNBRANDED

## (undated) DEVICE — SUTURE MCRYL SZ 4-0 L18IN ABSRB UD L19MM PS-2 3/8 CIR PRIM Y496G

## (undated) DEVICE — GOWN SIRUS NONREIN XL W/TWL: Brand: MEDLINE INDUSTRIES, INC.

## (undated) DEVICE — FOGARTY - HYDRAGRIP SURGICAL - CLAMP INSERTS: Brand: FOGARTY SOFTJAW

## (undated) DEVICE — STRAP POS W5XL72IN FOAM KNEE AND BODY HK LOOP CLSR DISP

## (undated) DEVICE — FOGARTY ARTERIAL EMBOLECTOMY CATHETER 3F 40CM: Brand: FOGARTY

## (undated) DEVICE — SHEET,DRAPE,53X77,STERILE: Brand: MEDLINE

## (undated) DEVICE — SURGIFOAM SPNG SZ 12-7

## (undated) DEVICE — CONMED SCOPE SAVER BITE BLOCK, 20X27 MM: Brand: SCOPE SAVER

## (undated) DEVICE — CORD ES L12FT BPLR FRCP

## (undated) DEVICE — SUTURE NONABSORBABLE MONOFILAMENT 6-0 BV-1 1X30 IN PROLENE 8709H

## (undated) DEVICE — SUTURE VCRL + SZ 3-0 L27IN ABSRB UD L26MM SH 1/2 CIR VCP416H

## (undated) DEVICE — ELECTRODE,RADIOTRANSLUCENT,FOAM,3PK: Brand: MEDLINE

## (undated) DEVICE — SYRINGE ANGIO 12ML COR CTRL ROT ADPT SLD PLUNG CLR BRL M

## (undated) DEVICE — GOWN SIRUS NONREIN LG W/TWL: Brand: MEDLINE INDUSTRIES, INC.

## (undated) DEVICE — FORCEP BX STD CAP 240CM RAD JAW 4

## (undated) DEVICE — ELECTRODE ECG MONITR FOAM TEAR DROP ADLT RED

## (undated) DEVICE — DISH PETRI ST LF

## (undated) DEVICE — GAUZE,SPONGE,4"X4",16PLY,XRAY,STRL,LF: Brand: MEDLINE

## (undated) DEVICE — SUTURE GOR TX SZ 4-0 L30IN NONABSORBABLE L13MM THC-13 1/2 5M12A

## (undated) DEVICE — SNAP KOVER: Brand: UNBRANDED